# Patient Record
Sex: FEMALE | Race: WHITE | Employment: OTHER | URBAN - METROPOLITAN AREA
[De-identification: names, ages, dates, MRNs, and addresses within clinical notes are randomized per-mention and may not be internally consistent; named-entity substitution may affect disease eponyms.]

---

## 2017-01-05 ENCOUNTER — OFFICE VISIT (OUTPATIENT)
Dept: FAMILY MEDICINE CLINIC | Age: 82
End: 2017-01-05

## 2017-01-05 VITALS
HEIGHT: 64 IN | SYSTOLIC BLOOD PRESSURE: 134 MMHG | HEART RATE: 64 BPM | OXYGEN SATURATION: 97 % | DIASTOLIC BLOOD PRESSURE: 68 MMHG | WEIGHT: 138.2 LBS | BODY MASS INDEX: 23.6 KG/M2

## 2017-01-05 DIAGNOSIS — F41.9 ANXIETY: ICD-10-CM

## 2017-01-05 DIAGNOSIS — M25.50 ARTHRALGIA, UNSPECIFIED JOINT: ICD-10-CM

## 2017-01-05 DIAGNOSIS — I10 ESSENTIAL HYPERTENSION: Primary | ICD-10-CM

## 2017-01-05 PROCEDURE — 99214 OFFICE O/P EST MOD 30 MIN: CPT | Performed by: FAMILY MEDICINE

## 2017-01-05 RX ORDER — HYDROCHLOROTHIAZIDE 25 MG/1
25 TABLET ORAL DAILY
Qty: 30 TABLET | Refills: 3 | Status: SHIPPED | OUTPATIENT
Start: 2017-01-05 | End: 2017-04-06 | Stop reason: SDUPTHER

## 2017-01-05 RX ORDER — HYDRALAZINE HYDROCHLORIDE 50 MG/1
50 TABLET, FILM COATED ORAL 3 TIMES DAILY
Qty: 90 TABLET | Refills: 3 | Status: SHIPPED | OUTPATIENT
Start: 2017-01-05 | End: 2017-04-06 | Stop reason: SDUPTHER

## 2017-01-05 ASSESSMENT — ENCOUNTER SYMPTOMS
STRIDOR: 0
WHEEZING: 0
COUGH: 0
SHORTNESS OF BREATH: 0
CHEST TIGHTNESS: 0
CHOKING: 0

## 2017-02-20 PROBLEM — R07.9 CHEST PAIN: Status: ACTIVE | Noted: 2017-02-20

## 2017-02-20 PROBLEM — C56.9 OVARIAN CANCER (HCC): Status: ACTIVE | Noted: 2017-02-20

## 2017-02-20 PROBLEM — E87.1 HYPONATREMIA: Status: ACTIVE | Noted: 2017-02-20

## 2017-02-20 PROBLEM — E03.9 HYPOTHYROID: Status: ACTIVE | Noted: 2017-02-20

## 2017-03-06 RX ORDER — LOSARTAN POTASSIUM 100 MG/1
TABLET ORAL
Qty: 90 TABLET | Refills: 1 | Status: SHIPPED | OUTPATIENT
Start: 2017-03-06 | End: 2017-09-26

## 2017-03-21 ENCOUNTER — TELEPHONE (OUTPATIENT)
Dept: FAMILY MEDICINE CLINIC | Age: 82
End: 2017-03-21

## 2017-04-06 ENCOUNTER — OFFICE VISIT (OUTPATIENT)
Dept: FAMILY MEDICINE CLINIC | Age: 82
End: 2017-04-06

## 2017-04-06 VITALS
DIASTOLIC BLOOD PRESSURE: 60 MMHG | BODY MASS INDEX: 23.99 KG/M2 | HEART RATE: 64 BPM | WEIGHT: 144 LBS | SYSTOLIC BLOOD PRESSURE: 150 MMHG | HEIGHT: 65 IN | OXYGEN SATURATION: 98 %

## 2017-04-06 DIAGNOSIS — M25.511 CHRONIC RIGHT SHOULDER PAIN: ICD-10-CM

## 2017-04-06 DIAGNOSIS — G89.29 CHRONIC RIGHT SHOULDER PAIN: ICD-10-CM

## 2017-04-06 DIAGNOSIS — I10 ESSENTIAL HYPERTENSION: Primary | ICD-10-CM

## 2017-04-06 DIAGNOSIS — M79.672 LEFT FOOT PAIN: ICD-10-CM

## 2017-04-06 DIAGNOSIS — E89.0 POSTOPERATIVE HYPOTHYROIDISM: ICD-10-CM

## 2017-04-06 DIAGNOSIS — F41.9 ANXIETY: ICD-10-CM

## 2017-04-06 PROCEDURE — 99214 OFFICE O/P EST MOD 30 MIN: CPT | Performed by: FAMILY MEDICINE

## 2017-04-06 RX ORDER — LEVOTHYROXINE SODIUM 0.1 MG/1
100 TABLET ORAL DAILY
Qty: 90 TABLET | Refills: 1 | Status: ON HOLD | OUTPATIENT
Start: 2017-04-06 | End: 2020-05-13 | Stop reason: CLARIF

## 2017-04-06 RX ORDER — HYDROCHLOROTHIAZIDE 25 MG/1
25 TABLET ORAL DAILY
Qty: 90 TABLET | Refills: 3 | Status: SHIPPED | OUTPATIENT
Start: 2017-04-06 | End: 2017-06-08 | Stop reason: SDUPTHER

## 2017-04-06 RX ORDER — HYDRALAZINE HYDROCHLORIDE 50 MG/1
50 TABLET, FILM COATED ORAL 3 TIMES DAILY
Qty: 270 TABLET | Refills: 3 | Status: ON HOLD | OUTPATIENT
Start: 2017-04-06 | End: 2020-05-13 | Stop reason: CLARIF

## 2017-04-06 ASSESSMENT — ENCOUNTER SYMPTOMS
CHOKING: 0
WHEEZING: 0
CHEST TIGHTNESS: 0
SHORTNESS OF BREATH: 0
COUGH: 0
STRIDOR: 0

## 2017-04-20 ENCOUNTER — TELEPHONE (OUTPATIENT)
Dept: FAMILY MEDICINE CLINIC | Age: 82
End: 2017-04-20

## 2017-04-20 DIAGNOSIS — R52 PAIN: Primary | ICD-10-CM

## 2017-05-18 ENCOUNTER — OFFICE VISIT (OUTPATIENT)
Dept: FAMILY MEDICINE CLINIC | Age: 82
End: 2017-05-18

## 2017-05-18 VITALS
WEIGHT: 141 LBS | HEIGHT: 65 IN | SYSTOLIC BLOOD PRESSURE: 134 MMHG | BODY MASS INDEX: 23.49 KG/M2 | HEART RATE: 72 BPM | OXYGEN SATURATION: 96 % | DIASTOLIC BLOOD PRESSURE: 62 MMHG

## 2017-05-18 DIAGNOSIS — K21.9 GASTROESOPHAGEAL REFLUX DISEASE, ESOPHAGITIS PRESENCE NOT SPECIFIED: ICD-10-CM

## 2017-05-18 DIAGNOSIS — K59.00 CONSTIPATION, UNSPECIFIED CONSTIPATION TYPE: Primary | ICD-10-CM

## 2017-05-18 PROCEDURE — 99213 OFFICE O/P EST LOW 20 MIN: CPT | Performed by: NURSE PRACTITIONER

## 2017-05-18 RX ORDER — GREEN TEA/HOODIA GORDONII 315-12.5MG
1 CAPSULE ORAL DAILY
Qty: 90 TABLET | Refills: 0 | Status: SHIPPED | OUTPATIENT
Start: 2017-05-18 | End: 2017-09-26

## 2017-05-18 RX ORDER — OMEPRAZOLE 20 MG/1
20 CAPSULE, DELAYED RELEASE ORAL DAILY
Qty: 90 CAPSULE | Refills: 0 | Status: SHIPPED | OUTPATIENT
Start: 2017-05-18 | End: 2017-11-15 | Stop reason: SDUPTHER

## 2017-05-18 ASSESSMENT — ENCOUNTER SYMPTOMS
BLOATING: 1
VOMITING: 0
HEMATOCHEZIA: 0
RECTAL PAIN: 0
CONSTIPATION: 1
NAUSEA: 0
BACK PAIN: 0
FLATUS: 1
ABDOMINAL PAIN: 0
DIARRHEA: 0

## 2017-06-05 ENCOUNTER — OFFICE VISIT (OUTPATIENT)
Dept: FAMILY MEDICINE CLINIC | Age: 82
End: 2017-06-05

## 2017-06-05 VITALS
SYSTOLIC BLOOD PRESSURE: 152 MMHG | DIASTOLIC BLOOD PRESSURE: 86 MMHG | OXYGEN SATURATION: 96 % | WEIGHT: 141 LBS | HEIGHT: 65 IN | HEART RATE: 84 BPM | BODY MASS INDEX: 23.49 KG/M2

## 2017-06-05 DIAGNOSIS — M54.5 ACUTE LOW BACK PAIN, UNSPECIFIED BACK PAIN LATERALITY, WITH SCIATICA PRESENCE UNSPECIFIED: ICD-10-CM

## 2017-06-05 DIAGNOSIS — K59.00 CONSTIPATION, UNSPECIFIED CONSTIPATION TYPE: ICD-10-CM

## 2017-06-05 PROCEDURE — 99213 OFFICE O/P EST LOW 20 MIN: CPT | Performed by: NURSE PRACTITIONER

## 2017-06-05 RX ORDER — DEXAMETHASONE 4 MG/1
6 TABLET ORAL 2 TIMES DAILY WITH MEALS
Qty: 15 TABLET | Refills: 0 | Status: SHIPPED | OUTPATIENT
Start: 2017-06-05 | End: 2017-06-10

## 2017-06-05 ASSESSMENT — ENCOUNTER SYMPTOMS
ABDOMINAL PAIN: 0
BOWEL INCONTINENCE: 0
BACK PAIN: 1
CONSTIPATION: 1

## 2017-06-06 RX ORDER — METOPROLOL SUCCINATE 100 MG/1
TABLET, EXTENDED RELEASE ORAL
Qty: 90 TABLET | Refills: 1 | Status: SHIPPED | OUTPATIENT
Start: 2017-06-06 | End: 2018-02-23 | Stop reason: SDUPTHER

## 2017-06-08 ENCOUNTER — OFFICE VISIT (OUTPATIENT)
Dept: FAMILY MEDICINE CLINIC | Age: 82
End: 2017-06-08

## 2017-06-08 VITALS
SYSTOLIC BLOOD PRESSURE: 126 MMHG | WEIGHT: 143 LBS | HEART RATE: 65 BPM | HEIGHT: 65 IN | BODY MASS INDEX: 23.82 KG/M2 | DIASTOLIC BLOOD PRESSURE: 70 MMHG | OXYGEN SATURATION: 98 %

## 2017-06-08 DIAGNOSIS — K59.00 CONSTIPATION, UNSPECIFIED CONSTIPATION TYPE: ICD-10-CM

## 2017-06-08 DIAGNOSIS — E86.0 DEHYDRATION: ICD-10-CM

## 2017-06-08 DIAGNOSIS — M54.42 ACUTE BILATERAL LOW BACK PAIN WITH BILATERAL SCIATICA: Primary | ICD-10-CM

## 2017-06-08 DIAGNOSIS — M54.41 ACUTE BILATERAL LOW BACK PAIN WITH BILATERAL SCIATICA: Primary | ICD-10-CM

## 2017-06-08 PROCEDURE — 99214 OFFICE O/P EST MOD 30 MIN: CPT | Performed by: FAMILY MEDICINE

## 2017-06-08 RX ORDER — HYDROCHLOROTHIAZIDE 25 MG/1
25 TABLET ORAL DAILY
Qty: 90 TABLET | Refills: 3 | Status: SHIPPED | OUTPATIENT
Start: 2017-06-08 | End: 2017-06-20 | Stop reason: ALTCHOICE

## 2017-06-08 ASSESSMENT — ENCOUNTER SYMPTOMS
SHORTNESS OF BREATH: 0
CHEST TIGHTNESS: 0
CHOKING: 0
COUGH: 0
STRIDOR: 0
BACK PAIN: 1
CONSTIPATION: 1
WHEEZING: 0

## 2017-06-09 ENCOUNTER — TELEPHONE (OUTPATIENT)
Dept: FAMILY MEDICINE CLINIC | Age: 82
End: 2017-06-09

## 2017-06-09 RX ORDER — OCTISALATE, AVOBENZONE, HOMOSALATE, AND OCTOCRYLENE 29.4; 29.4; 49; 25.48 MG/ML; MG/ML; MG/ML; MG/ML
1 LOTION TOPICAL DAILY
Qty: 30 CAPSULE | Refills: 3 | Status: SHIPPED | OUTPATIENT
Start: 2017-06-09 | End: 2017-09-26

## 2017-06-14 ENCOUNTER — TELEPHONE (OUTPATIENT)
Dept: FAMILY MEDICINE CLINIC | Age: 82
End: 2017-06-14

## 2017-06-14 DIAGNOSIS — Z13.9 SCREENING: Primary | ICD-10-CM

## 2017-06-15 ENCOUNTER — CARE COORDINATION (OUTPATIENT)
Dept: CARE COORDINATION | Age: 82
End: 2017-06-15

## 2017-06-15 ENCOUNTER — OFFICE VISIT (OUTPATIENT)
Dept: FAMILY MEDICINE CLINIC | Age: 82
End: 2017-06-15

## 2017-06-15 ENCOUNTER — TELEPHONE (OUTPATIENT)
Dept: FAMILY MEDICINE CLINIC | Age: 82
End: 2017-06-15

## 2017-06-15 VITALS
WEIGHT: 139 LBS | HEIGHT: 65 IN | SYSTOLIC BLOOD PRESSURE: 132 MMHG | BODY MASS INDEX: 23.16 KG/M2 | HEART RATE: 64 BPM | DIASTOLIC BLOOD PRESSURE: 60 MMHG | OXYGEN SATURATION: 96 %

## 2017-06-15 DIAGNOSIS — N39.0 URINARY TRACT INFECTION WITHOUT HEMATURIA, SITE UNSPECIFIED: ICD-10-CM

## 2017-06-15 DIAGNOSIS — S22.000D THORACIC COMPRESSION FRACTURE, WITH ROUTINE HEALING, SUBSEQUENT ENCOUNTER: Primary | ICD-10-CM

## 2017-06-15 PROBLEM — S22.000A THORACIC COMPRESSION FRACTURE (HCC): Status: ACTIVE | Noted: 2017-06-15

## 2017-06-15 PROCEDURE — 99214 OFFICE O/P EST MOD 30 MIN: CPT | Performed by: FAMILY MEDICINE

## 2017-06-15 ASSESSMENT — ENCOUNTER SYMPTOMS
BACK PAIN: 1
CHEST TIGHTNESS: 0
COUGH: 0
WHEEZING: 0
SHORTNESS OF BREATH: 1
CHOKING: 0
STRIDOR: 0

## 2017-06-16 ENCOUNTER — TELEPHONE (OUTPATIENT)
Dept: FAMILY MEDICINE CLINIC | Age: 82
End: 2017-06-16

## 2017-06-20 ENCOUNTER — OFFICE VISIT (OUTPATIENT)
Dept: FAMILY MEDICINE CLINIC | Age: 82
End: 2017-06-20

## 2017-06-20 VITALS
SYSTOLIC BLOOD PRESSURE: 110 MMHG | HEIGHT: 65 IN | BODY MASS INDEX: 22.66 KG/M2 | HEART RATE: 74 BPM | OXYGEN SATURATION: 98 % | WEIGHT: 136 LBS | DIASTOLIC BLOOD PRESSURE: 70 MMHG

## 2017-06-20 DIAGNOSIS — R14.0 ABDOMINAL BLOATING: ICD-10-CM

## 2017-06-20 DIAGNOSIS — S22.000D THORACIC COMPRESSION FRACTURE, WITH ROUTINE HEALING, SUBSEQUENT ENCOUNTER: ICD-10-CM

## 2017-06-20 DIAGNOSIS — N30.01 ACUTE CYSTITIS WITH HEMATURIA: Primary | ICD-10-CM

## 2017-06-20 PROCEDURE — 99214 OFFICE O/P EST MOD 30 MIN: CPT | Performed by: FAMILY MEDICINE

## 2017-06-20 ASSESSMENT — PATIENT HEALTH QUESTIONNAIRE - PHQ9
SUM OF ALL RESPONSES TO PHQ9 QUESTIONS 1 & 2: 0
SUM OF ALL RESPONSES TO PHQ QUESTIONS 1-9: 0
1. LITTLE INTEREST OR PLEASURE IN DOING THINGS: 0
2. FEELING DOWN, DEPRESSED OR HOPELESS: 0

## 2017-06-20 ASSESSMENT — ENCOUNTER SYMPTOMS
CHOKING: 0
CHEST TIGHTNESS: 0
COUGH: 0
BACK PAIN: 1
STRIDOR: 0
WHEEZING: 0
SHORTNESS OF BREATH: 0

## 2017-06-22 PROBLEM — N34.2 INFECTIVE URETHRITIS: Status: ACTIVE | Noted: 2017-06-22

## 2017-06-23 ENCOUNTER — CARE COORDINATION (OUTPATIENT)
Dept: CARE COORDINATION | Age: 82
End: 2017-06-23

## 2017-09-26 ENCOUNTER — OFFICE VISIT (OUTPATIENT)
Dept: ORTHOPEDIC SURGERY | Age: 82
End: 2017-09-26

## 2017-09-26 VITALS
HEART RATE: 55 BPM | DIASTOLIC BLOOD PRESSURE: 81 MMHG | BODY MASS INDEX: 22.48 KG/M2 | SYSTOLIC BLOOD PRESSURE: 152 MMHG | WEIGHT: 134.92 LBS | HEIGHT: 65 IN

## 2017-09-26 DIAGNOSIS — M51.36 DDD (DEGENERATIVE DISC DISEASE), LUMBAR: ICD-10-CM

## 2017-09-26 DIAGNOSIS — S22.000A COMPRESSION FRACTURE OF BODY OF THORACIC VERTEBRA (HCC): Primary | ICD-10-CM

## 2017-09-26 PROCEDURE — L0637 LSO SC R ANT/POS PNL PRE CST: HCPCS | Performed by: PHYSICIAN ASSISTANT

## 2017-09-26 PROCEDURE — 99203 OFFICE O/P NEW LOW 30 MIN: CPT | Performed by: PHYSICIAN ASSISTANT

## 2017-09-26 PROCEDURE — 72070 X-RAY EXAM THORAC SPINE 2VWS: CPT | Performed by: PHYSICIAN ASSISTANT

## 2017-09-26 PROCEDURE — 72100 X-RAY EXAM L-S SPINE 2/3 VWS: CPT | Performed by: PHYSICIAN ASSISTANT

## 2017-09-26 RX ORDER — METHYLPREDNISOLONE 4 MG/1
TABLET ORAL
Qty: 1 KIT | Refills: 0 | Status: SHIPPED | OUTPATIENT
Start: 2017-09-26 | End: 2017-10-02

## 2017-09-28 ENCOUNTER — TELEPHONE (OUTPATIENT)
Dept: ORTHOPEDIC SURGERY | Age: 82
End: 2017-09-28

## 2017-09-29 ENCOUNTER — HOSPITAL ENCOUNTER (OUTPATIENT)
Dept: MRI IMAGING | Age: 82
Discharge: HOME | End: 2017-09-29
Attending: PHYSICIAN ASSISTANT | Admitting: PHYSICIAN ASSISTANT

## 2017-09-29 DIAGNOSIS — M51.36 DDD (DEGENERATIVE DISC DISEASE), LUMBAR: ICD-10-CM

## 2017-09-29 DIAGNOSIS — R07.9 CHEST PAIN: ICD-10-CM

## 2017-09-29 DIAGNOSIS — S22.000A COMPRESSION FRACTURE OF BODY OF THORACIC VERTEBRA (HCC): ICD-10-CM

## 2017-09-29 LAB
CREAT SERPL-MCNC: 0.9 MG/DL (ref 0.6–1.2)
GFR AFRICAN AMERICAN: >60
GFR NON-AFRICAN AMERICAN: 60

## 2017-10-13 ENCOUNTER — OFFICE VISIT (OUTPATIENT)
Dept: ORTHOPEDIC SURGERY | Age: 82
End: 2017-10-13

## 2017-10-13 VITALS
BODY MASS INDEX: 22.48 KG/M2 | HEIGHT: 65 IN | HEART RATE: 69 BPM | DIASTOLIC BLOOD PRESSURE: 86 MMHG | SYSTOLIC BLOOD PRESSURE: 184 MMHG | WEIGHT: 134.92 LBS

## 2017-10-13 DIAGNOSIS — S22.000A COMPRESSION FRACTURE OF BODY OF THORACIC VERTEBRA (HCC): Primary | ICD-10-CM

## 2017-10-13 PROCEDURE — 99212 OFFICE O/P EST SF 10 MIN: CPT | Performed by: PHYSICAL MEDICINE & REHABILITATION

## 2017-11-22 ENCOUNTER — OFFICE VISIT (OUTPATIENT)
Dept: ORTHOPEDIC SURGERY | Age: 82
End: 2017-11-22

## 2017-11-22 VITALS
BODY MASS INDEX: 21.61 KG/M2 | HEART RATE: 67 BPM | DIASTOLIC BLOOD PRESSURE: 94 MMHG | SYSTOLIC BLOOD PRESSURE: 184 MMHG | WEIGHT: 134.48 LBS | HEIGHT: 66 IN

## 2017-11-22 DIAGNOSIS — S22.000D CLOSED COMPRESSION FRACTURE OF THORACIC VERTEBRA WITH ROUTINE HEALING, SUBSEQUENT ENCOUNTER: Primary | ICD-10-CM

## 2017-11-22 PROCEDURE — G8400 PT W/DXA NO RESULTS DOC: HCPCS | Performed by: PHYSICAL MEDICINE & REHABILITATION

## 2017-11-22 PROCEDURE — G8420 CALC BMI NORM PARAMETERS: HCPCS | Performed by: PHYSICAL MEDICINE & REHABILITATION

## 2017-11-22 PROCEDURE — 1123F ACP DISCUSS/DSCN MKR DOCD: CPT | Performed by: PHYSICAL MEDICINE & REHABILITATION

## 2017-11-22 PROCEDURE — 1036F TOBACCO NON-USER: CPT | Performed by: PHYSICAL MEDICINE & REHABILITATION

## 2017-11-22 PROCEDURE — G8427 DOCREV CUR MEDS BY ELIG CLIN: HCPCS | Performed by: PHYSICAL MEDICINE & REHABILITATION

## 2017-11-22 PROCEDURE — 4040F PNEUMOC VAC/ADMIN/RCVD: CPT | Performed by: PHYSICAL MEDICINE & REHABILITATION

## 2017-11-22 PROCEDURE — G8484 FLU IMMUNIZE NO ADMIN: HCPCS | Performed by: PHYSICAL MEDICINE & REHABILITATION

## 2017-11-22 PROCEDURE — 1090F PRES/ABSN URINE INCON ASSESS: CPT | Performed by: PHYSICAL MEDICINE & REHABILITATION

## 2017-11-22 PROCEDURE — 99213 OFFICE O/P EST LOW 20 MIN: CPT | Performed by: PHYSICAL MEDICINE & REHABILITATION

## 2017-11-22 NOTE — PROGRESS NOTES
Follow up: [address]:    Chief Complaint   Patient presents with    Back Pain     F/u thoracic compression fx       HISTORY OF PRESENT ILLNESS:                The patient is a 80 y.o. female cely at [facility] follow-up T12 compression fracture status post total 8 weeks of bracing. Still it back pain. No weakness read no radiating leg pain. She reports chronic low back pain and chronic thoracic pain quite diffusely she is not better. Pain Assessment  Location of Pain: Back  Severity of Pain: 8  Quality of Pain: Aching  Duration of Pain: Persistent  Frequency of Pain: Intermittent  Aggravating Factors: Bending, Stretching, Straightening, Exercise  Relieving Factors: Rest  Result of Injury: No  Work-Related Injury: No  Are there other pain locations you wish to document?: No      Past/Current Treatment     PT: Compliant with LSO ×8 weeks  Chiro:  Injections:   Medications: NSAIDs and Tylenol  Surgery:     Past Medical History: Medical history form was reviewed and scanned into the Media tab  Past Medical History:   Diagnosis Date    Acute bilateral low back pain with bilateral sciatica 6/8/2017    Anxiety     Cancer ([facility] [address]     ovarian cancer    Depression     Hypertension     Hypothyroid 2/20/2017    Thyroid disease         REVIEW OF SYSTEMS:   CONSTITUTIONAL: Denies unexplained weight loss, fevers, chills or fatigue  NEUROLOGIC: Denies tremors or seizures         PHYSICAL EXAM:    Vitals: Blood pressure (!) 184/94, pulse 67, height 5' 5.51\" (1.664 m), weight 134 lb 7.7 oz (61 kg), not currently breastfeeding. GENERAL EXAM:  · General Apparence: Patient is adequately groomed with no evidence of malnutrition. · Orientation: The patient is oriented to time, place and person. · Mood & Affect:The patient's mood and affect are appropriate   · Vascular: Examination reveals no swelling tenderness in upper or lower extremities.    · Lymphatic: The lymphatic examination bilaterally reveals DDD    Asst. Nichelle bracing additional 4 weeks with continue lifting restrictions and off work on short-term disability.   She'll follow-up in 3-4 weeks with repeat x-rays        DANNI Alaniz Arm

## 2017-11-28 ENCOUNTER — TELEPHONE (OUTPATIENT)
Dept: ORTHOPEDIC SURGERY | Age: 82
End: 2017-11-28

## 2017-12-13 ENCOUNTER — OFFICE VISIT (OUTPATIENT)
Dept: ORTHOPEDIC SURGERY | Age: 82
End: 2017-12-13

## 2017-12-13 VITALS
DIASTOLIC BLOOD PRESSURE: 84 MMHG | BODY MASS INDEX: 21.61 KG/M2 | WEIGHT: 134.48 LBS | HEIGHT: 66 IN | HEART RATE: 62 BPM | SYSTOLIC BLOOD PRESSURE: 173 MMHG

## 2017-12-13 DIAGNOSIS — S22.000A CLOSED COMPRESSION FRACTURE OF THORACIC VERTEBRA, INITIAL ENCOUNTER (HCC): Primary | ICD-10-CM

## 2017-12-13 PROCEDURE — G8484 FLU IMMUNIZE NO ADMIN: HCPCS | Performed by: PHYSICAL MEDICINE & REHABILITATION

## 2017-12-13 PROCEDURE — G8427 DOCREV CUR MEDS BY ELIG CLIN: HCPCS | Performed by: PHYSICAL MEDICINE & REHABILITATION

## 2017-12-13 PROCEDURE — 1090F PRES/ABSN URINE INCON ASSESS: CPT | Performed by: PHYSICAL MEDICINE & REHABILITATION

## 2017-12-13 PROCEDURE — G8400 PT W/DXA NO RESULTS DOC: HCPCS | Performed by: PHYSICAL MEDICINE & REHABILITATION

## 2017-12-13 PROCEDURE — 1036F TOBACCO NON-USER: CPT | Performed by: PHYSICAL MEDICINE & REHABILITATION

## 2017-12-13 PROCEDURE — 4040F PNEUMOC VAC/ADMIN/RCVD: CPT | Performed by: PHYSICAL MEDICINE & REHABILITATION

## 2017-12-13 PROCEDURE — 99213 OFFICE O/P EST LOW 20 MIN: CPT | Performed by: PHYSICAL MEDICINE & REHABILITATION

## 2017-12-13 PROCEDURE — 1123F ACP DISCUSS/DSCN MKR DOCD: CPT | Performed by: PHYSICAL MEDICINE & REHABILITATION

## 2017-12-13 PROCEDURE — G8420 CALC BMI NORM PARAMETERS: HCPCS | Performed by: PHYSICAL MEDICINE & REHABILITATION

## 2017-12-13 RX ORDER — CITALOPRAM 10 MG/1
10 TABLET ORAL
COMMUNITY
Start: 2017-12-05 | End: 2019-04-18

## 2017-12-29 ENCOUNTER — TELEPHONE (OUTPATIENT)
Dept: ORTHOPEDIC SURGERY | Age: 82
End: 2017-12-29

## 2018-01-24 ENCOUNTER — OFFICE VISIT (OUTPATIENT)
Dept: ORTHOPEDIC SURGERY | Age: 83
End: 2018-01-24

## 2018-01-24 VITALS
DIASTOLIC BLOOD PRESSURE: 63 MMHG | BODY MASS INDEX: 21.61 KG/M2 | WEIGHT: 134.48 LBS | HEART RATE: 63 BPM | HEIGHT: 66 IN | SYSTOLIC BLOOD PRESSURE: 139 MMHG

## 2018-01-24 DIAGNOSIS — M51.36 DDD (DEGENERATIVE DISC DISEASE), LUMBAR: ICD-10-CM

## 2018-01-24 DIAGNOSIS — S22.000D CLOSED COMPRESSION FRACTURE OF THORACIC VERTEBRA WITH ROUTINE HEALING, SUBSEQUENT ENCOUNTER: Primary | ICD-10-CM

## 2018-01-24 PROCEDURE — 99214 OFFICE O/P EST MOD 30 MIN: CPT | Performed by: PHYSICAL MEDICINE & REHABILITATION

## 2018-01-24 PROCEDURE — 4040F PNEUMOC VAC/ADMIN/RCVD: CPT | Performed by: PHYSICAL MEDICINE & REHABILITATION

## 2018-01-24 PROCEDURE — G8484 FLU IMMUNIZE NO ADMIN: HCPCS | Performed by: PHYSICAL MEDICINE & REHABILITATION

## 2018-01-24 PROCEDURE — 1123F ACP DISCUSS/DSCN MKR DOCD: CPT | Performed by: PHYSICAL MEDICINE & REHABILITATION

## 2018-01-24 PROCEDURE — G8400 PT W/DXA NO RESULTS DOC: HCPCS | Performed by: PHYSICAL MEDICINE & REHABILITATION

## 2018-01-24 PROCEDURE — G8427 DOCREV CUR MEDS BY ELIG CLIN: HCPCS | Performed by: PHYSICAL MEDICINE & REHABILITATION

## 2018-01-24 PROCEDURE — 1036F TOBACCO NON-USER: CPT | Performed by: PHYSICAL MEDICINE & REHABILITATION

## 2018-01-24 PROCEDURE — G8420 CALC BMI NORM PARAMETERS: HCPCS | Performed by: PHYSICAL MEDICINE & REHABILITATION

## 2018-01-24 PROCEDURE — 1090F PRES/ABSN URINE INCON ASSESS: CPT | Performed by: PHYSICAL MEDICINE & REHABILITATION

## 2018-01-30 ENCOUNTER — HOSPITAL ENCOUNTER (OUTPATIENT)
Dept: PHYSICAL THERAPY | Age: 83
Discharge: HOME | End: 2018-01-31
Admitting: PHYSICAL MEDICINE & REHABILITATION

## 2018-01-30 NOTE — PLAN OF CARE
Special Tests: repetative lumbar:  Flexion supine  Increased LBP. Extension sitting no change. Increased symptoms with supine leg pull. Normal Abnormal N/A Comments   Toe walk         Heel Walk       Fwd Bend-aberrant or innominate mvmt)       Trendelenburg       Kemps/Quadrant       Stork       ABELARDO/Dom       Hip scour       SLR       Crossed SLR       Supine to sit       Hip thrust       SI distraction/compression       PA/Spring       Prone Instability test       Prone knee bend       Sacral Spring/thrust       Hautards x          [x] Patient history, allergies, meds reviewed. Medical chart reviewed. See intake form. Review Of Systems (ROS):  [x]Performed Review of systems (Integumentary, CardioPulmonary, Neurological) by intake and observation. Intake form has been scanned into medical record. Patient has been instructed to contact their primary care physician regarding ROS issues if not already being addressed at this time.       Co-morbidities/Complexities (which will affect course of rehabilitation):   []None           Arthritic conditions   []Rheumatoid arthritis (M05.9)  [x]Osteoarthritis (M19.91)   Cardiovascular conditions   [x]Hypertension (I10)  []Hyperlipidemia (E78.5)  []Angina pectoris (I20)  []Atherosclerosis (I70)   Musculoskeletal conditions   []Disc pathology   []Congenital spine pathologies   []Prior surgical intervention  []Osteoporosis (M81.8)  []Osteopenia (M85.8)   Endocrine conditions   []Hypothyroid (E03.9)  []Hyperthyroid Gastrointestinal conditions   []Constipation (D52.77)   Metabolic conditions   []Morbid obesity (E66.01)  []Diabetes type 1(E10.65) or 2 (E11.65)   []Neuropathy (G60.9)     Pulmonary conditions   []Asthma (J45)  []Coughing   []COPD (J44.9)   Psychological Disorders  []Anxiety (F41.9)  []Depression (F32.9)   []Other:   [x]Other: CA          Barriers to/and or personal factors that will affect rehab potential:              []Age  []Sex []Motivation/Lack of Motivation                        []Co-Morbidities              []Cognitive Function, education/learning barriers              []Environmental, home barriers              []profession/work barriers  []past PT/medical experience  []other:  Justification:     Falls Risk Assessment (30 days):   [x] Falls Risk assessed and no intervention required. [] Falls Risk assessed and Patient requires intervention due to being higher risk   TUG score (>12s at risk):     [] Falls education provided, including       G-Codes:  PT G-Codes  Functional Assessment Tool Used: oswestry  Score: 52%  Functional Limitation: Changing and maintaining body position  Changing and Maintaining Body Position Current Status (): At least 40 percent but less than 60 percent impaired, limited or restricted  Changing and Maintaining Body Position Goal Status ():  At least 20 percent but less than 40 percent impaired, limited or restricted    ASSESSMENT:   Functional Impairments:     []Noted lumbar/proximal hip hypomobility   []Noted lumbosacral and/or generalized hypermobility   [x]Decreased Lumbosacral/hip/LE functional ROM   [x]Decreased core/proximal hip strength and neuromuscular control    [x]Decreased LE functional strength    [x]Abnormal reflexes/sensation/myotomal/dermatomal deficits  [x]Reduced balance/proprioceptive control    []other:      Functional Activity Limitations (from functional questionnaire and intake)   [x]Reduced ability to tolerate prolonged functional positions   [x]Reduced ability or difficulty with changes of positions or transfers between positions   [x]Reduced ability to maintain good posture and demonstrate good body mechanics with sitting, bending, and lifting   []Reduced ability to sleep   [x] Reduced ability or tolerance with driving and/or computer work   [x]Reduced ability to perform lifting, reaching, carrying tasks   [x]Reduced ability to squat   [x]Reduced ability to forward total of 1-2 or more elements   [] a total of 3 or more elements   [] a total of 4 or more elements   [x] A clinical presentation with:  [x] stable and/or uncomplicated characteristics   [] evolving clinical presentation with changing characteristics  [] unstable and unpredictable characteristics;   [x] Clinical decision making of [x] low, [] moderate, [] high complexity using standardized patient assessment instrument and/or measurable assessment of functional outcome. [x] EVAL (LOW) 22265 (typically 20 minutes face-to-face)  [] EVAL (MOD) 47679 (typically 30 minutes face-to-face)  [] EVAL (HIGH) 65726 (typically 45 minutes face-to-face)  [] RE-EVAL     PLAN: Begin PT focusing on: proximal hip mobilizations, LB mobs, LB core activation, proximal hip activation, and HEP    Frequency/Duration:  1-2 days per week for 6 Weeks:  Interventions:  [x]  Therapeutic exercise including: strength training, ROM, for LE, Glutes and core   [x]  NMR activation and proprioception for glutes , LE and Core   [x]  Manual therapy as indicated for Hip complex, LE and spine to include: Dry Needling/IASTM, STM, PROM, Gr I-IV mobilizations, manipulation. [x]  Modalities as needed that may include: thermal agents, E-stim, Biofeedback, US, iontophoresis as indicated  [x]  Patient education on joint protection, postural re-education, activity modification, progression of HEP. HEP instruction: (see scanned forms)    GOALS:  Patient stated goal: RTW/ improved ADLs    Therapist goals for Patient:   Short Term Goals: To be achieved in: 2 weeks  1. Independent in HEP and progression per patient tolerance, in order to prevent re-injury. 2. Patient will have a decrease in pain to facilitate improvement in movement, function, and ADLs as indicated by Functional Deficits. Long Term Goals: To be achieved in: 6 weeks  1. Disability index score of 39% or less for the GIANNI to assist with reaching prior level of function.    2. Patient will

## 2018-02-01 ENCOUNTER — HOSPITAL ENCOUNTER (OUTPATIENT)
Dept: PHYSICAL THERAPY | Age: 83
Discharge: HOME | End: 2018-02-28
Attending: PHYSICAL MEDICINE & REHABILITATION | Admitting: PHYSICAL MEDICINE & REHABILITATION

## 2018-02-13 ENCOUNTER — HOSPITAL ENCOUNTER (OUTPATIENT)
Dept: PHYSICAL THERAPY | Age: 83
Discharge: HOME | End: 2018-02-14
Admitting: PHYSICAL MEDICINE & REHABILITATION

## 2018-02-16 RX ORDER — LOSARTAN POTASSIUM 100 MG/1
TABLET ORAL
Qty: 90 TABLET | Refills: 1 | OUTPATIENT
Start: 2018-02-16

## 2018-02-26 RX ORDER — LOSARTAN POTASSIUM 100 MG/1
TABLET ORAL
Qty: 90 TABLET | Refills: 1 | Status: SHIPPED | OUTPATIENT
Start: 2018-02-26

## 2018-02-26 RX ORDER — METOPROLOL SUCCINATE 100 MG/1
TABLET, EXTENDED RELEASE ORAL
Qty: 90 TABLET | Refills: 1 | Status: SHIPPED | OUTPATIENT
Start: 2018-02-26

## 2018-03-01 ENCOUNTER — HOSPITAL ENCOUNTER (OUTPATIENT)
Dept: PHYSICAL THERAPY | Age: 83
Discharge: HOME | End: 2018-03-31
Attending: PHYSICAL MEDICINE & REHABILITATION | Admitting: PHYSICAL MEDICINE & REHABILITATION

## 2018-04-25 ENCOUNTER — OFFICE VISIT (OUTPATIENT)
Dept: ORTHOPEDIC SURGERY | Age: 83
End: 2018-04-25

## 2018-04-25 ENCOUNTER — TELEPHONE (OUTPATIENT)
Dept: ORTHOPEDIC SURGERY | Age: 83
End: 2018-04-25

## 2018-04-25 VITALS
WEIGHT: 134.48 LBS | HEART RATE: 65 BPM | DIASTOLIC BLOOD PRESSURE: 72 MMHG | BODY MASS INDEX: 21.61 KG/M2 | SYSTOLIC BLOOD PRESSURE: 174 MMHG | HEIGHT: 66 IN

## 2018-04-25 DIAGNOSIS — S22.000A CLOSED COMPRESSION FRACTURE OF THORACIC VERTEBRA, INITIAL ENCOUNTER (HCC): Primary | ICD-10-CM

## 2018-04-25 DIAGNOSIS — M54.50 ACUTE LOW BACK PAIN WITHOUT SCIATICA, UNSPECIFIED BACK PAIN LATERALITY: ICD-10-CM

## 2018-04-25 PROCEDURE — 99214 OFFICE O/P EST MOD 30 MIN: CPT | Performed by: PHYSICAL MEDICINE & REHABILITATION

## 2018-04-25 PROCEDURE — G8427 DOCREV CUR MEDS BY ELIG CLIN: HCPCS | Performed by: PHYSICAL MEDICINE & REHABILITATION

## 2018-04-25 PROCEDURE — G8420 CALC BMI NORM PARAMETERS: HCPCS | Performed by: PHYSICAL MEDICINE & REHABILITATION

## 2018-04-25 PROCEDURE — 1036F TOBACCO NON-USER: CPT | Performed by: PHYSICAL MEDICINE & REHABILITATION

## 2018-04-25 PROCEDURE — 1090F PRES/ABSN URINE INCON ASSESS: CPT | Performed by: PHYSICAL MEDICINE & REHABILITATION

## 2018-04-25 PROCEDURE — 1123F ACP DISCUSS/DSCN MKR DOCD: CPT | Performed by: PHYSICAL MEDICINE & REHABILITATION

## 2018-04-25 PROCEDURE — 4040F PNEUMOC VAC/ADMIN/RCVD: CPT | Performed by: PHYSICAL MEDICINE & REHABILITATION

## 2018-04-25 PROCEDURE — G8400 PT W/DXA NO RESULTS DOC: HCPCS | Performed by: PHYSICAL MEDICINE & REHABILITATION

## 2018-05-07 PROBLEM — M47.817 LUMBOSACRAL SPONDYLOSIS WITHOUT MYELOPATHY: Chronic | Status: ACTIVE | Noted: 2018-05-07

## 2018-05-07 PROBLEM — M51.26 DISPLACEMENT OF LUMBAR INTERVERTEBRAL DISC WITHOUT MYELOPATHY: Chronic | Status: ACTIVE | Noted: 2018-05-07

## 2018-05-07 PROBLEM — M51.26 DISPLACEMENT OF LUMBAR INTERVERTEBRAL DISC WITHOUT MYELOPATHY: Status: ACTIVE | Noted: 2018-05-07

## 2018-05-07 PROBLEM — M47.817 LUMBOSACRAL SPONDYLOSIS WITHOUT MYELOPATHY: Status: ACTIVE | Noted: 2018-05-07

## 2018-05-07 PROBLEM — M51.37 DEGENERATION OF LUMBAR OR LUMBOSACRAL INTERVERTEBRAL DISC: Chronic | Status: ACTIVE | Noted: 2018-05-07

## 2018-05-07 PROBLEM — M51.37 DEGENERATION OF LUMBAR OR LUMBOSACRAL INTERVERTEBRAL DISC: Status: ACTIVE | Noted: 2018-05-07

## 2018-05-09 ENCOUNTER — TELEPHONE (OUTPATIENT)
Dept: ORTHOPEDIC SURGERY | Age: 83
End: 2018-05-09

## 2018-09-26 PROBLEM — E86.0 DEHYDRATION: Status: RESOLVED | Noted: 2017-06-08 | Resolved: 2018-09-26

## 2018-11-14 ENCOUNTER — APPOINTMENT (OUTPATIENT)
Dept: CT IMAGING | Age: 83
End: 2018-11-14
Payer: MEDICARE

## 2018-11-14 ENCOUNTER — HOSPITAL ENCOUNTER (EMERGENCY)
Age: 83
Discharge: HOME | End: 2018-11-14
Attending: EMERGENCY MEDICINE
Payer: MEDICARE

## 2018-11-14 ENCOUNTER — APPOINTMENT (OUTPATIENT)
Dept: GENERAL RADIOLOGY | Age: 83
End: 2018-11-14
Payer: MEDICARE

## 2018-11-14 VITALS
TEMPERATURE: 97.7 F | OXYGEN SATURATION: 97 % | HEART RATE: 82 BPM | HEIGHT: 65 IN | WEIGHT: 126 LBS | SYSTOLIC BLOOD PRESSURE: 127 MMHG | RESPIRATION RATE: 20 BRPM | BODY MASS INDEX: 20.99 KG/M2 | DIASTOLIC BLOOD PRESSURE: 57 MMHG
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
## 2018-11-14 DIAGNOSIS — M25.551 RIGHT HIP PAIN: ICD-10-CM

## 2018-11-14 LAB
BACTERIA: ABNORMAL /HPF
BILIRUBIN URINE: NEGATIVE
BLOOD, URINE: NEGATIVE
CLARITY: CLEAR
COLOR: YELLOW
EPITHELIAL CELLS, UA: ABNORMAL /HPF
GLUCOSE URINE: NEGATIVE MG/DL
KETONES, URINE: NEGATIVE MG/DL
LEUKOCYTE ESTERASE, URINE: ABNORMAL
MICROSCOPIC EXAMINATION: YES
NITRITE, URINE: NEGATIVE
PH UA: 8
PROTEIN UA: 30 MG/DL
RBC UA: ABNORMAL /HPF (ref 0–2)
SPECIFIC GRAVITY UA: 1.01
URINE TYPE: ABNORMAL
UROBILINOGEN, URINE: 0.2 E.U./DL
WBC UA: ABNORMAL /HPF (ref 0–5)

## 2018-11-14 PROCEDURE — 81001 URINALYSIS AUTO W/SCOPE: CPT

## 2018-11-14 PROCEDURE — 73502 X-RAY EXAM HIP UNI 2-3 VIEWS: CPT

## 2018-11-14 PROCEDURE — 73700 CT LOWER EXTREMITY W/O DYE: CPT

## 2018-11-14 PROCEDURE — 6370000000 HC RX 637 (ALT 250 FOR IP): Performed by: EMERGENCY MEDICINE

## 2018-11-14 PROCEDURE — 99284 EMERGENCY DEPT VISIT MOD MDM: CPT

## 2018-11-14 RX ORDER — LIDOCAINE 50 MG/G
1 PATCH TOPICAL DAILY
Qty: 30 PATCH | Refills: 0 | Status: SHIPPED | OUTPATIENT
Start: 2018-11-14 | End: 2019-04-18

## 2018-11-14 RX ORDER — LIDOCAINE 4 G/G
1 PATCH TOPICAL DAILY
Status: DISCONTINUED | OUTPATIENT
Start: 2018-11-14 | End: 2018-11-14 | Stop reason: HOSPADM

## 2018-11-14 RX ORDER — OXYCODONE HYDROCHLORIDE 5 MG/1
5 TABLET ORAL ONCE
Status: COMPLETED | OUTPATIENT
Start: 2018-11-14 | End: 2018-11-14

## 2018-11-14 RX ADMIN — OXYCODONE HYDROCHLORIDE 5 MG: 5 TABLET ORAL at 10:01

## 2018-11-14 ASSESSMENT — PAIN DESCRIPTION - LOCATION
LOCATION: HIP;BACK
LOCATION: HIP
LOCATION: BACK;HIP
LOCATION: FLANK
LOCATION: BACK;HIP

## 2018-11-14 ASSESSMENT — PAIN SCALES - GENERAL
PAINLEVEL_OUTOF10: 5
PAINLEVEL_OUTOF10: 10
PAINLEVEL_OUTOF10: 2
PAINLEVEL_OUTOF10: 10
PAINLEVEL_OUTOF10: 2

## 2018-11-14 ASSESSMENT — ENCOUNTER SYMPTOMS
WHEEZING: 0
FACIAL SWELLING: 0
SHORTNESS OF BREATH: 0
VOICE CHANGE: 0
COLOR CHANGE: 0
VOMITING: 0
TROUBLE SWALLOWING: 0
STRIDOR: 0
NAUSEA: 0
ABDOMINAL PAIN: 0
BACK PAIN: 1

## 2018-11-14 ASSESSMENT — PAIN DESCRIPTION - ORIENTATION
ORIENTATION: RIGHT

## 2018-11-14 ASSESSMENT — PAIN DESCRIPTION - DESCRIPTORS: DESCRIPTORS: ACHING

## 2018-11-14 ASSESSMENT — PAIN SCALES - WONG BAKER
WONGBAKER_NUMERICALRESPONSE: 10
WONGBAKER_NUMERICALRESPONSE: 2

## 2018-11-14 ASSESSMENT — PAIN DESCRIPTION - PAIN TYPE
TYPE: ACUTE PAIN
TYPE: ACUTE PAIN

## 2018-11-14 NOTE — ED PROVIDER NOTES
for shortness of breath, wheezing and stridor. Cardiovascular: Negative for chest pain and palpitations. Gastrointestinal: Negative for abdominal pain, nausea and vomiting. Genitourinary: Negative for dysuria and vaginal bleeding. Musculoskeletal: Positive for arthralgias and back pain. Negative for neck pain, neck stiffness and stiffness. Skin: Negative for color change and wound. Neurological: Negative for seizures and syncope. Psychiatric/Behavioral: Negative for self-injury and suicidal ideas. Except as noted above the remainder of the review of systems was reviewed and negative. PAST MEDICAL HISTORY     Past Medical History:   Diagnosis Date    Acute bilateral low back pain with bilateral sciatica 6/8/2017    Anxiety     Cancer (HCC)     ovarian cancer    Degeneration of lumbar or lumbosacral intervertebral disc 5/7/2018    Depression     Hypertension     Hypothyroid 2/20/2017    Thyroid disease          SURGICAL HISTORY       Past Surgical History:   Procedure Laterality Date    HYSTERECTOMY      THYROIDECTOMY           CURRENT MEDICATIONS       Previous Medications    ALPRAZOLAM (XANAX) 0.25 MG TABLET    Take 1 tablet by mouth 2 times daily as needed for Anxiety    CITALOPRAM (CELEXA) 10 MG TABLET    Take 10 mg by mouth    HYDRALAZINE (APRESOLINE) 50 MG TABLET    Take 1 tablet by mouth 3 times daily    HYDROCODONE-ACETAMINOPHEN (NORCO) 5-325 MG PER TABLET    Take 1 tablet by mouth 2 times daily as needed for Pain for up to 30 days. Altagracia Restrepo     LEVOTHYROXINE (SYNTHROID) 100 MCG TABLET    Take 1 tablet by mouth Daily    LOSARTAN (COZAAR) 100 MG TABLET    TAKE 1 TABLET EVERY DAY    MELOXICAM (MOBIC PO)    Take by mouth    METOPROLOL SUCCINATE (TOPROL XL) 100 MG EXTENDED RELEASE TABLET    TAKE 1 TABLET EVERY DAY    OMEPRAZOLE (PRILOSEC) 20 MG DELAYED RELEASE CAPSULE    TAKE 1 CAPSULE EVERY DAY       ALLERGIES     Amlodipine besylate; Demerol hcl [meperidine]; and Lisinopril    FAMILY HISTORY       Family History   Problem Relation Age of Onset    Cancer Brother           SOCIAL HISTORY       Social History     Social History    Marital status:      Spouse name: N/A    Number of children: N/A    Years of education: N/A     Social History Main Topics    Smoking status: Former Smoker    Smokeless tobacco: Never Used    Alcohol use No    Drug use: No    Sexual activity: Not Asked     Other Topics Concern    None     Social History Narrative    None         PHYSICAL EXAM    (up to 7 for level 4, 8 or more for level 5)     ED Triage Vitals [11/14/18 0903]   BP Temp Temp Source Pulse Resp SpO2 Height Weight   (!) 204/80 97.7 °F (36.5 °C) Oral 77 20 100 % 5' 5\" (1.651 m) 126 lb (57.2 kg)       Physical Exam   Constitutional: She is oriented to person, place, and time. She appears well-developed and well-nourished. HENT:   Head: Normocephalic and atraumatic. Right Ear: External ear normal.   Left Ear: External ear normal.   No signs of head or neck trauma on exam   Eyes: Conjunctivae and EOM are normal.   Neck: Neck supple. No JVD present. No tracheal deviation present. Cardiovascular: Normal rate and intact distal pulses. Intact distal pulses diffusely   Pulmonary/Chest: Effort normal and breath sounds normal. No respiratory distress. She has no wheezes. Abdominal: Soft. She exhibits no distension. There is no tenderness. There is no rebound and no guarding. Musculoskeletal: Normal range of motion. She exhibits tenderness (mild right acetabular hip tenderness and right paraspinal lumbar tenderness. No mildine lumbar tenderness. No bruising or skin abnormality of the back or hip. Small skin tears to the hands but no tenderness to bones with palpation of hand). She exhibits no deformity. Full ROM of all joints of all 4 extremities. Neurological: She is alert and oriented to person, place, and time. No cranial nerve deficit. Moves all 4 extremities spontaneously.  Normal DISPOSITION/PLAN   DISPOSITION Decision To Discharge 11/14/2018 12:55:20 PM      PATIENT REFERRED TO:  Isabel Ron MD  [phone]    In 2 days      [facility] [address] [phone]    If symptoms worsen      DISCHARGE MEDICATIONS:  New Prescriptions    LIDOCAINE (LIDODERM) 5 %    Place 1 patch onto the skin daily 12 hours on, 12 hours off. (Please note that portions of this note were completed with a voice recognition program.  Efforts were made to edit the dictations but occasionally words aremis-transcribed. )    Kristen Rivas MD (electronically signed)  Attending Emergency Physician           Kristen Rivas MD  11/14/18 0243

## 2018-12-09 ENCOUNTER — APPOINTMENT (OUTPATIENT)
Dept: CT IMAGING | Age: 83
End: 2018-12-09
Payer: MEDICARE

## 2018-12-09 ENCOUNTER — HOSPITAL ENCOUNTER (EMERGENCY)
Age: 83
Discharge: HOME | End: 2018-12-09
Attending: EMERGENCY MEDICINE
Payer: MEDICARE

## 2018-12-09 VITALS
HEART RATE: 61 BPM | RESPIRATION RATE: 16 BRPM | DIASTOLIC BLOOD PRESSURE: 74 MMHG | WEIGHT: 127 LBS | BODY MASS INDEX: 20.41 KG/M2 | TEMPERATURE: 97.4 F | OXYGEN SATURATION: 97 % | SYSTOLIC BLOOD PRESSURE: 197 MMHG | HEIGHT: 66 IN

## 2018-12-09 DIAGNOSIS — S22.000A COMPRESSION FRACTURE OF BODY OF THORACIC VERTEBRA (HCC): ICD-10-CM

## 2018-12-09 DIAGNOSIS — S09.90XA CLOSED HEAD INJURY, INITIAL ENCOUNTER: Primary | ICD-10-CM

## 2018-12-09 PROCEDURE — 72125 CT NECK SPINE W/O DYE: CPT

## 2018-12-09 PROCEDURE — 6370000000 HC RX 637 (ALT 250 FOR IP): Performed by: EMERGENCY MEDICINE

## 2018-12-09 PROCEDURE — 70450 CT HEAD/BRAIN W/O DYE: CPT

## 2018-12-09 PROCEDURE — 99284 EMERGENCY DEPT VISIT MOD MDM: CPT

## 2018-12-09 RX ORDER — ACETAMINOPHEN 500 MG
1000 TABLET ORAL ONCE
Status: COMPLETED | OUTPATIENT
Start: 2018-12-09 | End: 2018-12-09

## 2018-12-09 RX ADMIN — ACETAMINOPHEN 1000 MG: 500 TABLET ORAL at 15:44

## 2018-12-09 ASSESSMENT — PAIN DESCRIPTION - LOCATION: LOCATION: HEAD

## 2018-12-09 ASSESSMENT — PAIN DESCRIPTION - PAIN TYPE: TYPE: ACUTE PAIN

## 2018-12-09 ASSESSMENT — PAIN SCALES - GENERAL
PAINLEVEL_OUTOF10: 5
PAINLEVEL_OUTOF10: 5

## 2018-12-09 ASSESSMENT — PAIN DESCRIPTION - DESCRIPTORS: DESCRIPTORS: ACHING

## 2018-12-10 NOTE — ED PROVIDER NOTES
[facility]  ED  eMERGENCY dEPARTMENT eNCOUnter        Pt Name: Rene Steward  MRN: [id number]  Joaquim 1935  Date of evaluation: 12/9/2018  Provider: Noemi Garland DO  PCP: Abby Garcia MD      CHIEF COMPLAINT       Chief Complaint   Patient presents with    Fall     tripped and fell at grocery store   [address] [facility]   (Location/Symptom, Timing/Onset, Context/Setting, Quality, Duration, Modifying Factors, Severity)  Note limiting factors. Rene Steward is a 80 y.o. female  presents to the emergency department with complaint of Trip and fall over her grocery cart at the grocery store. She hit her head on the freezer door I'll. She had no loss of consciousness. She is on no blood thinners. Her only complaint is of a mild headache. She has not ambulated since the event. She denies any nausea or dizziness or weakness. She denies any chest pain or shortness of breath. She denies abdominal pain. She denies any recent dysuria, fevers or chills. Nursing Notes were all reviewed and agreed with or any disagreements were addressed  in the HPI. REVIEW OF SYSTEMS    (2-9 systems for level 4, 10 or more for level 5)     Review of Systems  REVIEW OF SYSTEMS    Constitutional:  Denies fever, chills, weight loss or weakness   Eyes:  Denies photophobia or discharge   HENT:  Denies sore throat or ear pain   Respiratory:  Denies cough or shortness of breath   Cardiovascular:  Denies chest pain, palpitations or swelling   GI:  Denies abdominal pain, nausea, vomiting, or diarrhea   Musculoskeletal:    Skin:  Denies rash   Neurologic:  Denies  focal weakness or sensory changes   Endocrine:  Denies polyuria or polydypsia   Lymphatic:  Denies swollen glands   Psychiatric:  Denies depression, suicidal ideation or homicidal ideation   All systems negative except as marked. Positives and Pertinent negatives as per HPI.   Except as noted above in Exam: pt fell and hit the back of her head,headache now Acuity: Acute Relevant Medical/Surgical History: positive LOC,hx of ovarian cancer; ORDERING SYSTEM PROVIDED HISTORY: fall, head hit, midline ttp TECHNOLOGIST PROVIDED HISTORY: Ordering Physician Provided Reason for Exam: fall today Acuity: Acute Type of Exam: Initial Relevant Medical/Surgical History: neck pain History of hypertension, ovarian carcinoma FINDINGS: HEAD CT: BRAIN/VENTRICLES: There is no acute intracranial hemorrhage, mass effect or midline shift. No abnormal extra-axial fluid collection. The gray-white differentiation is maintained without evidence of an acute infarct. There is no evidence of hydrocephalus. There is prominence of the extra-axial spaces compatible with central atrophy. There is CSF attenuation within the medial aspect of the anterior cranial fossa adjacent to left frontal lobe most compatible with an arachnoid cyst. There is moderate patchy nonspecific abnormal low attenuation within the periventricular and subcortical white matter, likely representing chronic ischemic microvascular change. ORBITS: The visualized portion of the orbits demonstrate no acute abnormality. SINUSES: There is mild bilateral ethmoid sinus mucosal thickening. The remaining visualized paranasal sinuses and mastoid air cells are clear. SOFT TISSUES/SKULL:  No acute abnormality of the visualized skull or soft tissues. CERVICAL SPINE CT: BONES/ALIGNMENT: There is minimal 1-2 mm anterolisthesis at C7-T1 which was also present on the prior study likely degenerative. No acute fracture of the cervical spine is evident. There is or fracture of the T4 vertebral body with sclerotic fracture line which extends from the anterior to the posterior cortex, not involving the posterior elements. No retropulsion of posterior cortex. There is approximately 40% loss of anterior vertebral body height. The fracture is new since June 2018. No large paraspinal hematoma.

## 2019-04-18 ENCOUNTER — HOSPITAL ENCOUNTER (INPATIENT)
Age: 84
LOS: 2 days | Discharge: HOME | DRG: 641 | End: 2019-04-20
Attending: EMERGENCY MEDICINE | Admitting: INTERNAL MEDICINE
Payer: MEDICARE

## 2019-04-18 ENCOUNTER — APPOINTMENT (OUTPATIENT)
Dept: CT IMAGING | Age: 84
DRG: 641 | End: 2019-04-18
Payer: MEDICARE

## 2019-04-18 DIAGNOSIS — R10.9 CHRONIC ABDOMINAL PAIN: ICD-10-CM

## 2019-04-18 DIAGNOSIS — N17.9 AKI (ACUTE KIDNEY INJURY) (HCC): ICD-10-CM

## 2019-04-18 DIAGNOSIS — E87.1 HYPONATREMIA: Primary | ICD-10-CM

## 2019-04-18 DIAGNOSIS — G89.29 CHRONIC ABDOMINAL PAIN: ICD-10-CM

## 2019-04-18 LAB
A/G RATIO: 1.5 (ref 1.1–2.2)
ALBUMIN SERPL-MCNC: 3.8 G/DL (ref 3.4–5)
ALP BLD-CCNC: 91 U/L (ref 40–129)
ALT SERPL-CCNC: 14 U/L (ref 10–40)
ANION GAP SERPL CALCULATED.3IONS-SCNC: 16 MMOL/L (ref 3–16)
AST SERPL-CCNC: 20 U/L (ref 15–37)
BACTERIA: ABNORMAL /HPF
BASOPHILS ABSOLUTE: 0.1 K/UL (ref 0–0.2)
BASOPHILS RELATIVE PERCENT: 1 %
BILIRUB SERPL-MCNC: 0.3 MG/DL (ref 0–1)
BILIRUBIN URINE: NEGATIVE
BLOOD, URINE: NEGATIVE
BUN BLDV-MCNC: 22 MG/DL (ref 7–20)
CALCIUM SERPL-MCNC: 9 MG/DL (ref 8.3–10.6)
CHLORIDE BLD-SCNC: 83 MMOL/L (ref 99–110)
CLARITY: CLEAR
CO2: 20 MMOL/L (ref 21–32)
COLOR: YELLOW
CREAT SERPL-MCNC: 1.1 MG/DL (ref 0.6–1.2)
EOSINOPHILS ABSOLUTE: 0.5 K/UL (ref 0–0.6)
EOSINOPHILS RELATIVE PERCENT: 4.1 %
EPITHELIAL CELLS, UA: ABNORMAL /HPF
GFR AFRICAN AMERICAN: 57
GFR NON-AFRICAN AMERICAN: 47
GLOBULIN: 2.5 G/DL
GLUCOSE BLD-MCNC: 167 MG/DL (ref 70–99)
GLUCOSE URINE: NEGATIVE MG/DL
HCT VFR BLD CALC: 36.2 % (ref 36–48)
HEMOGLOBIN: 12.3 G/DL (ref 12–16)
KETONES, URINE: NEGATIVE MG/DL
LEUKOCYTE ESTERASE, URINE: ABNORMAL
LIPASE: 81 U/L (ref 13–60)
LYMPHOCYTES ABSOLUTE: 2.1 K/UL (ref 1–5.1)
LYMPHOCYTES RELATIVE PERCENT: 17.4 %
MAGNESIUM: 2 MG/DL (ref 1.8–2.4)
MCH RBC QN AUTO: 30.6 PG (ref 26–34)
MCHC RBC AUTO-ENTMCNC: 34 G/DL (ref 31–36)
MCV RBC AUTO: 90.2 FL (ref 80–100)
MICROSCOPIC EXAMINATION: YES
MONOCYTES ABSOLUTE: 1 K/UL (ref 0–1.3)
MONOCYTES RELATIVE PERCENT: 8.8 %
NEUTROPHILS ABSOLUTE: 8.2 K/UL (ref 1.7–7.7)
NEUTROPHILS RELATIVE PERCENT: 68.7 %
NITRITE, URINE: NEGATIVE
PDW BLD-RTO: 13.5 % (ref 12.4–15.4)
PH UA: 6 (ref 5–8)
PHOSPHORUS: 3.3 MG/DL (ref 2.5–4.9)
PLATELET # BLD: 282 K/UL (ref 135–450)
PMV BLD AUTO: 7.7 FL (ref 5–10.5)
POTASSIUM SERPL-SCNC: 3.8 MMOL/L (ref 3.5–5.1)
PROTEIN UA: NEGATIVE MG/DL
RBC # BLD: 4.01 M/UL (ref 4–5.2)
RBC UA: ABNORMAL /HPF (ref 0–2)
SODIUM BLD-SCNC: 119 MMOL/L (ref 136–145)
SPECIFIC GRAVITY UA: <=1.005 (ref 1–1.03)
TOTAL PROTEIN: 6.3 G/DL (ref 6.4–8.2)
URINE TYPE: ABNORMAL
UROBILINOGEN, URINE: 0.2 E.U./DL
WBC # BLD: 11.9 K/UL (ref 4–11)
WBC UA: ABNORMAL /HPF (ref 0–5)

## 2019-04-18 PROCEDURE — 96374 THER/PROPH/DIAG INJ IV PUSH: CPT

## 2019-04-18 PROCEDURE — 6360000004 HC RX CONTRAST MEDICATION: Performed by: PHYSICIAN ASSISTANT

## 2019-04-18 PROCEDURE — 87086 URINE CULTURE/COLONY COUNT: CPT

## 2019-04-18 PROCEDURE — 81001 URINALYSIS AUTO W/SCOPE: CPT

## 2019-04-18 PROCEDURE — 2500000003 HC RX 250 WO HCPCS: Performed by: PHYSICIAN ASSISTANT

## 2019-04-18 PROCEDURE — 85025 COMPLETE CBC W/AUTO DIFF WBC: CPT

## 2019-04-18 PROCEDURE — 83690 ASSAY OF LIPASE: CPT

## 2019-04-18 PROCEDURE — 2000000000 HC ICU R&B

## 2019-04-18 PROCEDURE — 74177 CT ABD & PELVIS W/CONTRAST: CPT

## 2019-04-18 PROCEDURE — 99284 EMERGENCY DEPT VISIT MOD MDM: CPT

## 2019-04-18 PROCEDURE — 87186 SC STD MICRODIL/AGAR DIL: CPT

## 2019-04-18 PROCEDURE — 87077 CULTURE AEROBIC IDENTIFY: CPT

## 2019-04-18 PROCEDURE — 2580000003 HC RX 258: Performed by: PHYSICIAN ASSISTANT

## 2019-04-18 PROCEDURE — 96361 HYDRATE IV INFUSION ADD-ON: CPT

## 2019-04-18 PROCEDURE — 6370000000 HC RX 637 (ALT 250 FOR IP): Performed by: PHYSICIAN ASSISTANT

## 2019-04-18 PROCEDURE — 84100 ASSAY OF PHOSPHORUS: CPT

## 2019-04-18 PROCEDURE — 36415 COLL VENOUS BLD VENIPUNCTURE: CPT

## 2019-04-18 PROCEDURE — 96375 TX/PRO/DX INJ NEW DRUG ADDON: CPT

## 2019-04-18 PROCEDURE — 84443 ASSAY THYROID STIM HORMONE: CPT

## 2019-04-18 PROCEDURE — 80053 COMPREHEN METABOLIC PANEL: CPT

## 2019-04-18 PROCEDURE — 94762 N-INVAS EAR/PLS OXIMTRY CONT: CPT

## 2019-04-18 PROCEDURE — 6360000002 HC RX W HCPCS: Performed by: PHYSICIAN ASSISTANT

## 2019-04-18 PROCEDURE — 83735 ASSAY OF MAGNESIUM: CPT

## 2019-04-18 RX ORDER — SODIUM CHLORIDE 9 MG/ML
INJECTION, SOLUTION INTRAVENOUS CONTINUOUS
Status: DISCONTINUED | OUTPATIENT
Start: 2019-04-19 | End: 2019-04-19

## 2019-04-18 RX ORDER — 0.9 % SODIUM CHLORIDE 0.9 %
500 INTRAVENOUS SOLUTION INTRAVENOUS ONCE
Status: COMPLETED | OUTPATIENT
Start: 2019-04-18 | End: 2019-04-18

## 2019-04-18 RX ORDER — PANTOPRAZOLE SODIUM 40 MG/1
40 TABLET, DELAYED RELEASE ORAL
Status: DISCONTINUED | OUTPATIENT
Start: 2019-04-19 | End: 2019-04-20 | Stop reason: HOSPADM

## 2019-04-18 RX ORDER — HYDRALAZINE HYDROCHLORIDE 50 MG/1
50 TABLET, FILM COATED ORAL 3 TIMES DAILY
Status: DISCONTINUED | OUTPATIENT
Start: 2019-04-19 | End: 2019-04-20 | Stop reason: HOSPADM

## 2019-04-18 RX ORDER — ONDANSETRON 2 MG/ML
4 INJECTION INTRAMUSCULAR; INTRAVENOUS EVERY 6 HOURS PRN
Status: DISCONTINUED | OUTPATIENT
Start: 2019-04-18 | End: 2019-04-20 | Stop reason: HOSPADM

## 2019-04-18 RX ORDER — METOPROLOL SUCCINATE 50 MG/1
100 TABLET, EXTENDED RELEASE ORAL DAILY
Status: DISCONTINUED | OUTPATIENT
Start: 2019-04-19 | End: 2019-04-20 | Stop reason: HOSPADM

## 2019-04-18 RX ORDER — ALPRAZOLAM 0.25 MG/1
0.25 TABLET ORAL 2 TIMES DAILY PRN
Status: DISCONTINUED | OUTPATIENT
Start: 2019-04-18 | End: 2019-04-20 | Stop reason: HOSPADM

## 2019-04-18 RX ORDER — OXYCODONE HYDROCHLORIDE AND ACETAMINOPHEN 5; 325 MG/1; MG/1
1 TABLET ORAL EVERY 8 HOURS PRN
Status: DISCONTINUED | OUTPATIENT
Start: 2019-04-18 | End: 2019-04-20

## 2019-04-18 RX ORDER — SODIUM CHLORIDE 0.9 % (FLUSH) 0.9 %
10 SYRINGE (ML) INJECTION PRN
Status: DISCONTINUED | OUTPATIENT
Start: 2019-04-18 | End: 2019-04-20 | Stop reason: HOSPADM

## 2019-04-18 RX ORDER — LOSARTAN POTASSIUM 100 MG/1
100 TABLET ORAL DAILY
Status: DISCONTINUED | OUTPATIENT
Start: 2019-04-19 | End: 2019-04-20 | Stop reason: HOSPADM

## 2019-04-18 RX ORDER — ACETAMINOPHEN 325 MG/1
650 TABLET ORAL EVERY 4 HOURS PRN
Status: DISCONTINUED | OUTPATIENT
Start: 2019-04-18 | End: 2019-04-20 | Stop reason: HOSPADM

## 2019-04-18 RX ORDER — ONDANSETRON 2 MG/ML
4 INJECTION INTRAMUSCULAR; INTRAVENOUS EVERY 30 MIN PRN
Status: DISCONTINUED | OUTPATIENT
Start: 2019-04-18 | End: 2019-04-18

## 2019-04-18 RX ORDER — MORPHINE SULFATE 4 MG/ML
4 INJECTION, SOLUTION INTRAMUSCULAR; INTRAVENOUS ONCE
Status: COMPLETED | OUTPATIENT
Start: 2019-04-18 | End: 2019-04-18

## 2019-04-18 RX ORDER — LEVOTHYROXINE SODIUM 0.1 MG/1
100 TABLET ORAL DAILY
Status: DISCONTINUED | OUTPATIENT
Start: 2019-04-19 | End: 2019-04-20 | Stop reason: HOSPADM

## 2019-04-18 RX ORDER — SODIUM CHLORIDE 0.9 % (FLUSH) 0.9 %
10 SYRINGE (ML) INJECTION EVERY 12 HOURS SCHEDULED
Status: DISCONTINUED | OUTPATIENT
Start: 2019-04-19 | End: 2019-04-20 | Stop reason: HOSPADM

## 2019-04-18 RX ADMIN — ONDANSETRON 4 MG: 2 INJECTION INTRAMUSCULAR; INTRAVENOUS at 19:20

## 2019-04-18 RX ADMIN — LIDOCAINE HYDROCHLORIDE: 20 SOLUTION ORAL; TOPICAL at 19:20

## 2019-04-18 RX ADMIN — SODIUM CHLORIDE 500 ML: 9 INJECTION, SOLUTION INTRAVENOUS at 20:27

## 2019-04-18 RX ADMIN — IOPAMIDOL 75 ML: 755 INJECTION, SOLUTION INTRAVENOUS at 19:46

## 2019-04-18 RX ADMIN — MORPHINE SULFATE 4 MG: 4 INJECTION INTRAVENOUS at 19:20

## 2019-04-18 RX ADMIN — FAMOTIDINE 20 MG: 10 INJECTION, SOLUTION INTRAVENOUS at 19:20

## 2019-04-18 ASSESSMENT — ENCOUNTER SYMPTOMS
SORE THROAT: 0
ABDOMINAL PAIN: 1
NAUSEA: 0
EYE REDNESS: 0
RHINORRHEA: 0
FACIAL SWELLING: 0
CHEST TIGHTNESS: 0
EYE PAIN: 0
SHORTNESS OF BREATH: 0
DIARRHEA: 1
BACK PAIN: 0
COUGH: 0
CONSTIPATION: 0

## 2019-04-18 ASSESSMENT — PAIN DESCRIPTION - DESCRIPTORS: DESCRIPTORS: CONSTANT;CRAMPING

## 2019-04-18 ASSESSMENT — PAIN SCALES - GENERAL
PAINLEVEL_OUTOF10: 10
PAINLEVEL_OUTOF10: 10
PAINLEVEL_OUTOF10: 7

## 2019-04-18 ASSESSMENT — PAIN DESCRIPTION - PAIN TYPE: TYPE: ACUTE PAIN

## 2019-04-18 ASSESSMENT — PAIN DESCRIPTION - LOCATION: LOCATION: ABDOMEN

## 2019-04-18 NOTE — ED NOTES
Pt reports pain to abdomen and back for \" a long time now\", Pt states \"it seems like there is nothing anyone can do for the pain. \" Pt reports pain is constant but with times of increased back pain.       Neal Macedo RN  04/18/19 6887

## 2019-04-19 PROBLEM — K59.1 FUNCTIONAL DIARRHEA: Status: ACTIVE | Noted: 2019-04-19

## 2019-04-19 LAB
ALBUMIN SERPL-MCNC: 3.5 G/DL (ref 3.4–5)
ALBUMIN SERPL-MCNC: 3.5 G/DL (ref 3.4–5)
ALBUMIN SERPL-MCNC: 3.7 G/DL (ref 3.4–5)
ALBUMIN SERPL-MCNC: 4 G/DL (ref 3.4–5)
ANION GAP SERPL CALCULATED.3IONS-SCNC: 10 MMOL/L (ref 3–16)
ANION GAP SERPL CALCULATED.3IONS-SCNC: 10 MMOL/L (ref 3–16)
ANION GAP SERPL CALCULATED.3IONS-SCNC: 11 MMOL/L (ref 3–16)
ANION GAP SERPL CALCULATED.3IONS-SCNC: 12 MMOL/L (ref 3–16)
BUN BLDV-MCNC: 15 MG/DL (ref 7–20)
BUN BLDV-MCNC: 16 MG/DL (ref 7–20)
BUN BLDV-MCNC: 17 MG/DL (ref 7–20)
BUN BLDV-MCNC: 19 MG/DL (ref 7–20)
CALCIUM SERPL-MCNC: 8.4 MG/DL (ref 8.3–10.6)
CALCIUM SERPL-MCNC: 8.6 MG/DL (ref 8.3–10.6)
CALCIUM SERPL-MCNC: 8.8 MG/DL (ref 8.3–10.6)
CALCIUM SERPL-MCNC: 8.9 MG/DL (ref 8.3–10.6)
CHLORIDE BLD-SCNC: 89 MMOL/L (ref 99–110)
CHLORIDE BLD-SCNC: 91 MMOL/L (ref 99–110)
CHLORIDE URINE RANDOM: 27 MMOL/L
CO2: 23 MMOL/L (ref 21–32)
CO2: 25 MMOL/L (ref 21–32)
CREAT SERPL-MCNC: 0.9 MG/DL (ref 0.6–1.2)
CREAT SERPL-MCNC: 1 MG/DL (ref 0.6–1.2)
GFR AFRICAN AMERICAN: >60
GFR NON-AFRICAN AMERICAN: 53
GFR NON-AFRICAN AMERICAN: 60
GLUCOSE BLD-MCNC: 155 MG/DL (ref 70–99)
GLUCOSE BLD-MCNC: 164 MG/DL (ref 70–99)
GLUCOSE BLD-MCNC: 173 MG/DL (ref 70–99)
GLUCOSE BLD-MCNC: 208 MG/DL (ref 70–99)
PHOSPHORUS: 2.6 MG/DL (ref 2.5–4.9)
PHOSPHORUS: 2.6 MG/DL (ref 2.5–4.9)
PHOSPHORUS: 2.9 MG/DL (ref 2.5–4.9)
PHOSPHORUS: 3.2 MG/DL (ref 2.5–4.9)
POTASSIUM SERPL-SCNC: 3.7 MMOL/L (ref 3.5–5.1)
POTASSIUM SERPL-SCNC: 3.7 MMOL/L (ref 3.5–5.1)
POTASSIUM SERPL-SCNC: 3.9 MMOL/L (ref 3.5–5.1)
POTASSIUM SERPL-SCNC: 4 MMOL/L (ref 3.5–5.1)
POTASSIUM, UR: 14.9 MMOL/L
SODIUM BLD-SCNC: 124 MMOL/L (ref 136–145)
SODIUM BLD-SCNC: 124 MMOL/L (ref 136–145)
SODIUM BLD-SCNC: 125 MMOL/L (ref 136–145)
SODIUM BLD-SCNC: 126 MMOL/L (ref 136–145)
SODIUM URINE: 25 MMOL/L
TSH REFLEX: 4.09 UIU/ML (ref 0.27–4.2)

## 2019-04-19 PROCEDURE — 6360000002 HC RX W HCPCS: Performed by: INTERNAL MEDICINE

## 2019-04-19 PROCEDURE — 2580000003 HC RX 258: Performed by: INTERNAL MEDICINE

## 2019-04-19 PROCEDURE — 6370000000 HC RX 637 (ALT 250 FOR IP): Performed by: INTERNAL MEDICINE

## 2019-04-19 PROCEDURE — 36415 COLL VENOUS BLD VENIPUNCTURE: CPT

## 2019-04-19 PROCEDURE — 2060000000 HC ICU INTERMEDIATE R&B

## 2019-04-19 PROCEDURE — 99223 1ST HOSP IP/OBS HIGH 75: CPT | Performed by: INTERNAL MEDICINE

## 2019-04-19 PROCEDURE — 2500000003 HC RX 250 WO HCPCS: Performed by: INTERNAL MEDICINE

## 2019-04-19 PROCEDURE — 82436 ASSAY OF URINE CHLORIDE: CPT

## 2019-04-19 PROCEDURE — 80069 RENAL FUNCTION PANEL: CPT

## 2019-04-19 PROCEDURE — 83935 ASSAY OF URINE OSMOLALITY: CPT

## 2019-04-19 PROCEDURE — 99232 SBSQ HOSP IP/OBS MODERATE 35: CPT | Performed by: INTERNAL MEDICINE

## 2019-04-19 PROCEDURE — 84300 ASSAY OF URINE SODIUM: CPT

## 2019-04-19 PROCEDURE — 84133 ASSAY OF URINE POTASSIUM: CPT

## 2019-04-19 RX ORDER — LABETALOL HYDROCHLORIDE 5 MG/ML
10 INJECTION, SOLUTION INTRAVENOUS EVERY 6 HOURS PRN
Status: DISCONTINUED | OUTPATIENT
Start: 2019-04-19 | End: 2019-04-20 | Stop reason: HOSPADM

## 2019-04-19 RX ORDER — LIDOCAINE HYDROCHLORIDE 40 MG/ML
SOLUTION TOPICAL
Status: DISPENSED
Start: 2019-04-19 | End: 2019-04-20

## 2019-04-19 RX ORDER — SODIUM CHLORIDE 9 MG/ML
INJECTION, SOLUTION INTRAVENOUS CONTINUOUS
Status: DISCONTINUED | OUTPATIENT
Start: 2019-04-19 | End: 2019-04-20

## 2019-04-19 RX ADMIN — ACETAMINOPHEN 650 MG: 325 TABLET ORAL at 18:49

## 2019-04-19 RX ADMIN — MUPIROCIN: 20 OINTMENT TOPICAL at 09:23

## 2019-04-19 RX ADMIN — HYDRALAZINE HYDROCHLORIDE 50 MG: 50 TABLET, FILM COATED ORAL at 20:28

## 2019-04-19 RX ADMIN — LABETALOL HYDROCHLORIDE 10 MG: 5 INJECTION INTRAVENOUS at 17:16

## 2019-04-19 RX ADMIN — CEFTRIAXONE SODIUM 1 G: 1 INJECTION, POWDER, FOR SOLUTION INTRAMUSCULAR; INTRAVENOUS at 07:09

## 2019-04-19 RX ADMIN — PANTOPRAZOLE SODIUM 40 MG: 40 TABLET, DELAYED RELEASE ORAL at 09:23

## 2019-04-19 RX ADMIN — MUPIROCIN: 20 OINTMENT TOPICAL at 20:28

## 2019-04-19 RX ADMIN — LEVOTHYROXINE SODIUM 100 MCG: 100 TABLET ORAL at 09:23

## 2019-04-19 RX ADMIN — SODIUM CHLORIDE: 9 INJECTION, SOLUTION INTRAVENOUS at 00:16

## 2019-04-19 RX ADMIN — HYDRALAZINE HYDROCHLORIDE 50 MG: 50 TABLET, FILM COATED ORAL at 00:15

## 2019-04-19 RX ADMIN — Medication 10 ML: at 20:28

## 2019-04-19 RX ADMIN — METOPROLOL SUCCINATE 100 MG: 50 TABLET, EXTENDED RELEASE ORAL at 09:23

## 2019-04-19 RX ADMIN — LOSARTAN POTASSIUM 100 MG: 100 TABLET ORAL at 09:24

## 2019-04-19 RX ADMIN — ENOXAPARIN SODIUM 40 MG: 40 INJECTION SUBCUTANEOUS at 09:23

## 2019-04-19 RX ADMIN — HYDRALAZINE HYDROCHLORIDE 50 MG: 50 TABLET, FILM COATED ORAL at 09:24

## 2019-04-19 RX ADMIN — ALPRAZOLAM 0.25 MG: 0.25 TABLET ORAL at 18:49

## 2019-04-19 RX ADMIN — SODIUM CHLORIDE: 9 INJECTION, SOLUTION INTRAVENOUS at 13:28

## 2019-04-19 RX ADMIN — MAGNESIUM HYDROXIDE 30 ML: 400 SUSPENSION ORAL at 20:28

## 2019-04-19 RX ADMIN — DEXTROSE MONOHYDRATE: 50 INJECTION, SOLUTION INTRAVENOUS at 04:51

## 2019-04-19 RX ADMIN — HYDRALAZINE HYDROCHLORIDE 50 MG: 50 TABLET, FILM COATED ORAL at 13:28

## 2019-04-19 ASSESSMENT — PAIN SCALES - GENERAL
PAINLEVEL_OUTOF10: 1
PAINLEVEL_OUTOF10: 0
PAINLEVEL_OUTOF10: 3

## 2019-04-19 NOTE — CONSULTS
[facility]    Consult Note           Reason for Consult:  hyponatremia  Requesting Physician:  Dr. Darion Iverson    Chief Complaint:  abd pain, diarrhea and nausea    History of Present Illness on 4/19/19:    80 y.o. yo female with PMH of irritable bowel syndrome, hypothyroidism, depression, chronic hyponatremia (low 130s) who is admitted for acute hyponatremia of 119  Pt has been having abd pain for several months but lately get worse, had diarrhea and felt nauseous as well; she came to Ed and was given 500 ml of iv ns bolus along with 75 ml/h after which na increased to 126 at which time it was stopped ( 0455) and 1l d5w has been given; since then na has come down to 124  She c/o feeling better and wants to drink this am    Past Medical History:        Diagnosis Date    Acute bilateral low back pain with bilateral sciatica 6/8/2017    Anxiety     Cancer ([facility] [address]     ovarian cancer    Degeneration of lumbar or lumbosacral intervertebral disc 5/7/2018    Depression     Hypertension     Hypothyroid 2/20/2017    Thyroid disease        Past Surgical History:        Procedure Laterality Date    HYSTERECTOMY      THYROIDECTOMY         Home Medications:    No current facility-administered medications on file prior to encounter.       Current Outpatient Medications on File Prior to Encounter   Medication Sig Dispense Refill    losartan (COZAAR) 100 MG tablet TAKE 1 TABLET EVERY DAY 90 tablet 1    metoprolol succinate (TOPROL XL) 100 MG extended release tablet TAKE 1 TABLET EVERY DAY 90 tablet 1    omeprazole (PRILOSEC) 20 MG delayed release capsule TAKE 1 CAPSULE EVERY DAY 90 capsule 1    levothyroxine (SYNTHROID) 100 MCG tablet Take 1 tablet by mouth Daily 90 tablet 1    hydrALAZINE (APRESOLINE) 50 MG tablet Take 1 tablet by mouth 3 times daily 270 tablet 3    ALPRAZolam (XANAX) 0.25 MG tablet Take 1 tablet by mouth 2 times daily as needed for Anxiety (Patient taking differently: Take 0.5 mg by mouth 2 times daily as needed for Anxiety. ) 180 tablet 1    oxyCODONE-acetaminophen (PERCOCET) 5-325 MG per tablet Take 1 tablet by mouth 3 times daily as needed for Pain for up to 30 days. Sergio West Date: 3/23/19 90 tablet 0       Allergies:  Amlodipine besylate; Demerol hcl [meperidine]; and Lisinopril    Social History:    Social History     Socioeconomic History    Marital status:      Spouse name: Not on file    Number of children: Not on file    Years of education: Not on file    Highest education level: Not on file   Occupational History    Not on file   Social Needs    Financial resource strain: Not on file    Food insecurity:     Worry: Not on file     Inability: Not on file    Transportation needs:     Medical: Not on file     Non-medical: Not on file   Tobacco Use    Smoking status: Former Smoker    Smokeless tobacco: Never Used   Substance and Sexual Activity    Alcohol use: No    Drug use: No    Sexual activity: Not on file   Lifestyle    Physical activity:     Days per week: Not on file     Minutes per session: Not on file    Stress: Not on file   Relationships    Social connections:     Talks on phone: Not on file     Gets together: Not on file     Attends Protestant service: Not on file     Active member of club or organization: Not on file     Attends meetings of clubs or organizations: Not on file     Relationship status: Not on file    Intimate partner violence:     Fear of current or ex partner: Not on file     Emotionally abused: Not on file     Physically abused: Not on file     Forced sexual activity: Not on file   Other Topics Concern    Not on file   Social History Narrative    Not on file       Family History:   Family History   Problem Relation Age of Onset    Cancer Brother        Review of Systems:   Pertinent positives stated above in HPI. All other 10 systems were reviewed and were negative.      Physical exam:   Constitutional:  VITALS:  BP (!) 153/64   Pulse 66   Temp 97.8 °F (36.6 °C) (Oral)   Resp 11   Ht 5' 5\" (1.651 m)   Wt 115 lb 12.8 oz (52.5 kg)   SpO2 100%   BMI 19.27 kg/m²   Gen: alert, awake, nad  Skin: no rash, turgor wnl  Heent:  eomi, mmm  Neck: no bruits or jvd noted, thyroid normal  Cardiovascular:  S1, S2 without m/r/g  Respiratory: CTA B without w/r/r; respiratory effort normal  Abdomen:  +bs, soft, nt, nd, no hepatosplenomegaly  Ext: no lower extremity edema  Neuro/Psy: AAoriented times 3 ; moves all 4 ext  Musculoskeletal:  Rom, muscular strength intact; digits, nails normal    Data/  Recent Labs     04/18/19  1840   WBC 11.9*   HGB 12.3   HCT 36.2   MCV 90.2        Recent Labs     04/18/19  1840 04/19/19  0102 04/19/19  0607 04/19/19  0945   * 126* 124* 124*   K 3.8 4.0 3.7 3.7   CL 83* 91* 91* 89*   CO2 20* 25 23 23   GLUCOSE 167* 155* 173* 164*   PHOS 3.3 3.2 2.9 2.6   MG 2.00  --   --   --    BUN 22* 19 16 15   CREATININE 1.1 1.0 0.9 0.9   LABGLOM 47* 53* 60* 60*   GFRAA 57* >60 >60 >60     CT scan a/p w iv contrast   FINDINGS:   Motion artifact degrades many of the images.       Lower Chest: Minimal dependent lower lobe atelectasis.       Organs: The liver, spleen, pancreas, adrenal glands, gallbladder, and kidneys   show no acute abnormality.  The upper poles of both kidneys, greater on the   left, are obscured by motion artifact.       GI/Bowel: Portions of the bowel are obscured by motion artifact.  No   significantly dilated loops of bowel or definite bowel wall thickening.  The   appendix is upper limits in caliber measuring 8 mm, but tapers distally.  No   large amount of pericecal stranding is present. Ashly Terry are large number of   sigmoid colonic diverticula without evidence of active inflammation.       Pelvis:  The urinary bladder is nondistended.  The uterus is surgically absent.       Peritoneum/Retroperitoneum: The aorta is normal in caliber and course,   showing calcifications.       Bones/Soft

## 2019-04-19 NOTE — ED NOTES
2046- Consult placed to hospitalist Dr. Robby Hernandez for Nel BURCIAGA   2053- Dr. Robby Hernandez returned page and spoke to Nel Purdy  04/18/19 2047       Mel Purdy  04/18/19 2053

## 2019-04-19 NOTE — CARE COORDINATION
Case Management Assessment  Initial Evaluation    Date/Time of Evaluation: 4/19/2019 10:54 AM  Assessment Completed by: Yashira Blankenship    Patient Name: Enrike Serrano  YOB: 1935  Diagnosis: Hyponatremia [E87.1]  Date / Time: 4/18/2019  6:24 PM  Admission status/Date: inpatient 04/19/2019  Chart Reviewed: Yes      Patient Interviewed: Yes   Family Interviewed:  No      Hospitalization in the last 30 days:  No    Contacts  :     Relationship to Patient:   Phone Number:    Alternate Contact:     Relationship to Patient:     Phone Number:    Met with:    Current PCP Dr. Crystal Keller required for SNF : Y       3 night stay required - [employer]  Support Systems: Children, Family Members  Transportation: sons provide transportation    Meal Preparation: self    Housing  Home Environment: resides alone in her home, basement  Steps: basement steps-states she does not go to the basement, steps to entry x 3  Plans to Return to Present Housing: Yes  Other Identified Issues: none     Justin Sabamonty Kanu  Currently active with [address] : No     Passport/Waiver : No  :                      Phone Number:    Passport/Waiver Services: none          Durable Medical Equipment   DME Provider: none   Equipment: Walker_x_Cane__RTS__ BSC__Shower Chair__  02__ HHN__ CPAP__  BiPap__  Hospital Bed__ W/C___ Other__________      Has Home O2 in place on admit:  No  Informed of need to bring portable home O2 tank on day of discharge for nursing to connect prior to leaving:   Not Indicated  Verbalized agreement/Understanding:   Not Indicated    Community Service Affiliation  Dialysis:  No    · Name:  · Location  · Dialysis Schedule:  · Phone:   · Fax:     Outpatient PT/OT: No    Cancer Center: No     CHF Clinic: No     Pulmonary Rehab: No  Pain Clinic: Yes - Clinic at [address]: No    Wound Clinic: No     Other: none     DISCHARGE PLAN: Chart reviewed. Spoke with the patient at the bedside. Explained the role of the RN Case Manager, voices understanding. Patient reports that she resides in her home alone, has 2 sons that are supportive and provide assistance when needed, transportation, etc. She has basement steps that she \"never uses\" and 3 steps to entry. She states that she is IADL, uses a walker occasionally. She plans to return home following her hospital stay. CM will follow. Explained Case Management role/services.

## 2019-04-19 NOTE — PROGRESS NOTES
Progress Note    Admit Date:  4/18/2019     admitted with hyponatremia     Subjective:  Ms. Jitendra Ernst  Is better, Na levels better . up , out of bed , upto commode. very weak, frail, well oriented . no distress . BP trending Up, home meds resumed      no diarrhea     Objective:   BP (!) 147/61   Pulse 69   Temp 97.8 °F (36.6 °C) (Oral)   Resp 14   Ht 5' 5\" (1.651 m)   Wt 115 lb 12.8 oz (52.5 kg)   SpO2 100%   BMI 19.27 kg/m²       Intake/Output Summary (Last 24 hours) at 4/19/2019 1503  Last data filed at 4/19/2019 1501  Gross per 24 hour   Intake 888 ml   Output 2000 ml   Net -1112 ml         Physical Exam:  General:  Awake, alert, NAD   thin, frail, fatigued   Skin:  Warm and dry  Neck:  JVD absent. Neck supple  Chest:  Clear to auscultation, respiration easy. No wheezes, rales or rhonchi. Cardiovascular:  RRR ,S1S2 normal  Abdomen:  Soft, non tender, non distended, BS +  Extremities:  No edema. Intact peripheral pulses. Brisk cap refill, < 2 secs  Neuro: non focal      Medications:   Scheduled Meds:   IV infusion builder   Intravenous Once    cefTRIAXone (ROCEPHIN) IV  1 g Intravenous Q24H    mupirocin   Nasal BID    lidocaine        hydrALAZINE  50 mg Oral TID    levothyroxine  100 mcg Oral Daily    losartan  100 mg Oral Daily    metoprolol succinate  100 mg Oral Daily    pantoprazole  40 mg Oral QAM AC    sodium chloride flush  10 mL Intravenous 2 times per day    enoxaparin  40 mg Subcutaneous Daily       Continuous Infusions:   sodium chloride 50 mL/hr at 04/19/19 1328       Data:  CBC:   Recent Labs     04/18/19  1840   WBC 11.9*   RBC 4.01   HGB 12.3   HCT 36.2   MCV 90.2   RDW 13.5        BMP:   Recent Labs     04/19/19  0102 04/19/19  0607 04/19/19  0945   * 124* 124*   K 4.0 3.7 3.7   CL 91* 91* 89*   CO2 25 23 23   PHOS 3.2 2.9 2.6   BUN 19 16 15   CREATININE 1.0 0.9 0.9     BNP: No results for input(s): BNP in the last 72 hours.   PT/INR: No results for input(s): PROTIME, INR in the last 72 hours. APTT: No results for input(s): APTT in the last 72 hours. CARDIAC ENZYMES: No results for input(s): CKMB, CKMBINDEX, TROPONINI in the last 72 hours. Invalid input(s): CKTOTAL;3  FASTING LIPID PANEL:  Lab Results   Component Value Date    CHOL 184 06/30/2016    HDL 53 06/30/2016    TRIG 71 06/30/2016     LIVER PROFILE:   Recent Labs     04/18/19  1840   AST 20   ALT 14   BILITOT 0.3   ALKPHOS 91          CT ABDOMEN PELVIS W IV CONTRAST Additional Contrast? None   Final Result   Small amount of free pelvic fluid. Appendix upper limits of normal in caliber but tapers distally. Acute   appendicitis is considered unlikely but not excluded. Assessment:  Active Problems:    Hyponatremia  Resolved Problems:    * No resolved hospital problems. *      Plan:    1. Hyponatremia,   - acute on chronic ( baseline 130 )  - Na  119 on presentation   - hydrate with NS, decrease rate. DC SSRI, cont fluid restrition . Seen by nephro   - monitor BMP  - TSH normal   - transfer out Of ICU       2. Acute on chronic abdominal pain  - numerous w/u in past, recent scope by her Gi doctor (Dr. Cj Bean). Has had similar waxing and waning pain over the last 3 years. Monitor. 3. HTN - resume home meds . Losartan, metoprolol, hydralazine . - ( hydralazine resumed at a lower dose )     4. UTI, mild but possibly contributing to #2. IV ceftriaxone, day 2, f/u cx.     5. Chronic pain, cont home regimen.     6. Psy, cont home regimen.     DVT Prophylaxis: Lovenox  DIET GENERAL; Daily Fluid Restriction: Dry Tray  Code Status: Full Code         Jo Blanchard MD 4/19/2019 3:03 PM

## 2019-04-19 NOTE — PROGRESS NOTES
Bedside report and Pt care transferred to [facility]. Pt denies any assistance at this time.

## 2019-04-19 NOTE — H&P
100 MCG tablet Take 1 tablet by mouth Daily 4/6/17  Yes Jojo Nelson, DO   hydrALAZINE (APRESOLINE) 50 MG tablet Take 1 tablet by mouth 3 times daily 4/6/17  Yes Jojo Nelson, DO   ALPRAZolam Shan Jodi) 0.25 MG tablet Take 1 tablet by mouth 2 times daily as needed for Anxiety  Patient taking differently: Take 0.5 mg by mouth 2 times daily as needed for Anxiety. 12/8/16  Yes Td Nelson,        Allergies:  Amlodipine besylate; Demerol hcl [meperidine]; and Lisinopril    Social History:    TOBACCO:   reports that she has quit smoking. She has never used smokeless tobacco.  ETOH:   reports that she does not drink alcohol. Family History:  Reviewed in detail and negative for DM, Early CAD, Cancer (except as below). Positive as follows:        Problem Relation Age of Onset    Cancer Brother        REVIEW OF SYSTEMS:   Pertinent positives/negatives as follows: abd pain, diarrhea, and as discussed in HPI, otherwise a complete ROS performed and all other systems are negative  PHYSICAL EXAM PERFORMED:    BP (!) 148/62   Pulse 86   Temp 97.4 °F (36.3 °C) (Temporal)   Resp 17   Ht 5' 5\" (1.651 m)   Wt 128 lb (58.1 kg)   SpO2 98%   BMI 21.30 kg/m²     GEN:  A&Ox3, NAD. HEENT:  NC/AT,EOMI, semi dry MM, no erythema/exudates or visible masses. CVS:  Normal S1,S2. RRR. Without M/G/R.   LUNG:   CTA-B. no wheezes, rales or rhonchi  ABD:  Soft, ND, ep[igastric ttp, BS+ x4. Without G/R.  EXT: 2+ pulses, no c/c/e. Brisk cap refill. PSY:  Thought process intact, affect appropriate. TY:  CN III-XII intact, moves all 4 spontaneously, sensory grossly intact. SKIN: No rash or lesions on visible skin.     Chart review shows recent radiographs:  Ct Abdomen Pelvis W Iv Contrast Additional Contrast? None    Result Date: 4/18/2019  EXAMINATION: CT OF THE ABDOMEN AND PELVIS WITH CONTRAST 4/18/2019 7:52 pm TECHNIQUE: CT of the abdomen and pelvis was performed with the administration of intravenous contrast. Multiplanar reformatted images are provided for review. Dose modulation, iterative reconstruction, and/or weight based adjustment of the mA/kV was utilized to reduce the radiation dose to as low as reasonably achievable. COMPARISON: 06/18/2018 HISTORY: ORDERING SYSTEM PROVIDED HISTORY: Pain TECHNOLOGIST PROVIDED HISTORY: If patient is on cardiac monitor and/or pulse ox, they may be taken off cardiac monitor and pulse ox, left on O2 if currently on. All monitors reattached when patient returns to room. Additional Contrast?->None Ordering Physician Provided Reason for Exam: Abdominal Pain (Pt c/o abdominal pain and back pain that started 3 years ago, Pt reports the pain got worse last night) Acuity: Chronic Type of Exam: Ongoing Relevant Medical/Surgical History: h/o gastritis, IBS, ovarian CA, thyroid disease,  SX: thyroidectomy, hysterectomy History of hysterectomy FINDINGS: Motion artifact degrades many of the images. Lower Chest: Minimal dependent lower lobe atelectasis. Organs: The liver, spleen, pancreas, adrenal glands, gallbladder, and kidneys show no acute abnormality. The upper poles of both kidneys, greater on the left, are obscured by motion artifact. GI/Bowel: Portions of the bowel are obscured by motion artifact. No significantly dilated loops of bowel or definite bowel wall thickening. The appendix is upper limits in caliber measuring 8 mm, but tapers distally. No large amount of pericecal stranding is present. There are large number of sigmoid colonic diverticula without evidence of active inflammation. Pelvis: The urinary bladder is nondistended. The uterus is surgically absent. Peritoneum/Retroperitoneum: The aorta is normal in caliber and course, showing calcifications. Bones/Soft Tissues: Mild degenerative anterolisthesis at L4-5 is again seen. A chronic compression deformity of T12 is also again noted. No acute bony abnormality. Small amount of free pelvic fluid. Small amount of free pelvic fluid. Appendix upper limits of normal in caliber but tapers distally. Acute appendicitis is considered unlikely but not excluded. CBC:  Recent Labs     04/18/19 1840   WBC 11.9*   HGB 12.3   HCT 36.2         RENAL  Recent Labs     04/18/19 1840   *   K 3.8   CL 83*   CO2 20*   BUN 22*   CREATININE 1.1   GLUCOSE 167*     Hemoglobin a1c:  No results found for: LABA1C  LFT'S:  Recent Labs     04/18/19 1840   AST 20   ALT 14   BILITOT 0.3   ALKPHOS 91     U/A:  Recent Labs     04/18/19 2020   LEUKOCYTESUR SMALL*   BACTERIA Rare*   WBCUA 6-10*   COLORU Yellow   RBCUA 0-2   CLARITYU Clear   SPECGRAV <=1.005   BLOODU Negative   GLUCOSEU Negative       PHYSICIAN CERTIFICATION    I certify that Gloria Martinez is expected to be hospitalized for 2 midnights based on the following assessment and plan:    ASSESSMENT/PLAN:  1. Hyponatremia, 119, NS @ 75 cc/h, renal panel q4h, intensivist c/s, stat nephro c/s for fluid mgmt. Admit to ICU. 2. Acute on chronic abdominal pain, numerous w/u in past, recent scope by her Gi doctor (Dr. Hunter Martinez). Has had similar waxing and waning pain over the last 3 years. Monitor. 3. UTI, mild but possibly contributing to #2. IV ceftriaxone, f/u cx.   4. Chronic pain, cont home regimen. 5. Psy, cont home regimen. DVT Prophylaxis: Lovenox  Diet: NPO  Code Status: Full Code   PT/OT Eval Status: Will order if needed and as patient condition allows  Dispo - Admit to inpatient ICU    Due to the immediate potential for life-threatening deterioration due to hyponatremia, I spent 33 minutes providing critical care. This time is excluding time spent performing procedures. Tonya Gordon MD    Thank you Venecia Quintero MD for the opportunity to be involved in this patient's care. If you have any questions or concerns please feel free to contact me via the [employer] at [phone]. This chart was generated using the [address] [place] [address] [address] [employer] system.  I created this record but it may contain dictation errors given the limitations of this technology.

## 2019-04-19 NOTE — ED NOTES
Report given to [facility], RN, and taken to ICU.   Patient son took her belongings home, patient will keep her cell phone     Lydia Baltazar RN  04/18/19 9857

## 2019-04-19 NOTE — ED PROVIDER NOTES
I independently examined and evaluated Zohreh Denton. In brief, patient presenting for evaluation of abdominal discomfort. Upon my assessment she states that her abdomen is feeling somewhat improved. Her son is at baseline and states also she is a history of hyponatremia and wants to stay warm and she does not drink enough water. .    Focused exam revealed patient is overall well-appearing and appears her stated age. Abdomen soft, nontender nondistended and my exam..    ED course: Patient was sent for evaluation of abdominal discomfort and noted to be significantly hyponatremic with sodium of 119. I suspect hypovolemic hyponatremia and patient will be hydrated intravenously. I did obtain a CT scan which showed upper limits of normal caliber of the appendix and based on lack of abdominal tenderness the right lower quadrant and feel that appendicitis is much less likely. Serial abdominal examinations would be beneficial while she is here. We'll plan for admission at this time for treatment of her hyponatremia. All diagnostic, treatment, and disposition decisions were made by myself in conjunction with the advanced practice provider. For all further details of the patient's emergency department visit, please see the advanced practice provider's documentation. Comment: Please note this report has been produced using speech recognition software and may contain errors related to that system including errors in grammar, punctuation, and spelling, as well as words and phrases that may be inappropriate. If there are any questions or concerns please feel free to contact the dictating provider for clarification.         Belgica Adan MD  04/18/19 0523

## 2019-04-19 NOTE — PROGRESS NOTES
Assessment completed as charted. Patient awake and wanting to eat breakfast. Patient denies any N/V and states her abd pain is feeling better than usual. Resp. E/e. Call light in reach will monitor.

## 2019-04-19 NOTE — ED NOTES
Follow up call placed to clinical  regarding delay in transfer. ICU RN will be down when current admission is fixed.      Yosef Sutherland RN  04/18/19 9413

## 2019-04-19 NOTE — FLOWSHEET NOTE
04/19/19 0000   Vitals   Temp 98.7 °F (37.1 °C)   Temp Source Oral   Pulse 94   Resp 16   BP (!) 157/60   MAP (mmHg) 86   Level of Consciousness 0   MEWS Score 1   Oxygen Therapy   SpO2 98 %   O2 Device None (Room air)   Pt admitted to ICU room 5. Vital signs stable. Pt is alert and oriented and denies any pain or SOB at the moment. Pt is NSR on the monitor. Evening medication administration completed. Normal saline continues to infuse at 75 ml/hr. All ICU monitoring devices in place and functioning properly. Family updated. Pt denies any further assistance at the moment. Will continue to monitor.

## 2019-04-19 NOTE — CONSULTS
Patient is being seen at the request of  Dr. Benitez Boateng for a consultation for hyponatremia    HISTORY OF PRESENT ILLNESS: The patient is an 77-year-old woman with a past medical history of irritable bowel syndrome, ovarian cancer, hypothyroidism who presented to [facility] with acute worsening of her chronic abdominal pain and diarrhea. She noted that over the last several days her crampy abdominal pain had increased. She follows with Dr. Faheem Nolan for GI care. During evaluation in the emergency department she underwent a CT scan which did not reveal an acute abdominal process. She did have hyponatremia with a sodium of 119. She was suspected to be hypovolemic and was given IV fluid resuscitation. She was admitted to the ICU for further care. This morning she is feeling a bit better. Less abdominal pain. PAST MEDICAL HISTORY:  Past Medical History:   Diagnosis Date    Acute bilateral low back pain with bilateral sciatica 6/8/2017    Anxiety     Cancer (HCC)     ovarian cancer    Degeneration of lumbar or lumbosacral intervertebral disc 5/7/2018    Depression     Hypertension     Hypothyroid 2/20/2017    Thyroid disease      PAST SURGICAL HISTORY:  Past Surgical History:   Procedure Laterality Date    HYSTERECTOMY      THYROIDECTOMY         FAMILY HISTORY:  family history includes Cancer in her brother. SOCIAL HISTORY:   reports that she has quit smoking.  She has never used smokeless tobacco.    Scheduled Meds:   IV infusion builder   Intravenous Once    cefTRIAXone (ROCEPHIN) IV  1 g Intravenous Q24H    mupirocin   Nasal BID    hydrALAZINE  50 mg Oral TID    levothyroxine  100 mcg Oral Daily    losartan  100 mg Oral Daily    metoprolol succinate  100 mg Oral Daily    pantoprazole  40 mg Oral QAM AC    sodium chloride flush  10 mL Intravenous 2 times per day    enoxaparin  40 mg Subcutaneous Daily     Continuous Infusions:    PRN Meds:  labetalol, ALPRAZolam, oxyCODONE-acetaminophen, sodium chloride flush, magnesium hydroxide, ondansetron, acetaminophen    ALLERGIES:  Patient is allergic to amlodipine besylate; demerol hcl [meperidine]; and lisinopril. REVIEW OF SYSTEMS:  Constitutional: Negative for fever  HENT: Negative for sore throat  Eyes: Negative for redness   Respiratory: Negative for dyspnea, cough  Cardiovascular: Negative for chest pain  Gastrointestinal: Negative for vomiting,+  diarrhea   Genitourinary: Negative for hematuria   Musculoskeletal: Negative for arthralgias   Skin: Negative for rash  Neurological: Negative for syncope  Hematological: Negative for adenopathy  Psychiatric/Behavorial: Negative for anxiety    PHYSICAL EXAM:  Blood pressure (!) 161/63, pulse 82, temperature 97.9 °F (36.6 °C), temperature source Oral, resp. rate 18, height 5' 5\" (1.651 m), weight 115 lb 12.8 oz (52.5 kg), SpO2 100 %, not currently breastfeeding.' on RA  Gen: No distress. Normocephalic, atraumatic. Eyes: PERRL. No sclera icterus. No conjunctival injection. ENT: No discharge. Pharynx clear. Neck: Trachea midline. No obvious mass. Resp: No accessory muscle use. No crackles. No wheezes. No rhonchi. No dullness on percussion. CV: Regular rate. Regular rhythm. No murmur or rub. No edema. Peripheral pulses are 2+. Capillary refill is less than 3 seconds. GI: soft, Non-tender. Non-distended. No hernia. Skin: Warm and dry. No nodule on exposed extremities. Lymph: No cervical LAD. No supraclavicular LAD. M/S: No cyanosis. No joint deformity. No clubbing. Neuro: Awake. Alert. Moves all four extremities. CN grossly intact.     LABS:  CBC:   Recent Labs     04/18/19  1840   WBC 11.9*   HGB 12.3   HCT 36.2   MCV 90.2        BMP:   Recent Labs     04/18/19  1840 04/19/19  0102 04/19/19  0607   * 126* 124*   K 3.8 4.0 3.7   CL 83* 91* 91*   CO2 20* 25 23   PHOS 3.3 3.2 2.9   BUN 22* 19 16   CREATININE 1.1 1.0 0.9     LIVER PROFILE:   Recent Labs 04/18/19  1840   AST 20   ALT 14   LIPASE 81.0*   BILITOT 0.3   ALKPHOS 91     PT/INR: No results for input(s): PROTIME, INR in the last 72 hours. APTT: No results for input(s): APTT in the last 72 hours. UA:  Recent Labs     04/18/19 2020   COLORU Yellow   PHUR 6.0   WBCUA 6-10*   RBCUA 0-2   BACTERIA Rare*   CLARITYU Clear   SPECGRAV <=1.005   LEUKOCYTESUR SMALL*   UROBILINOGEN 0.2   BILIRUBINUR Negative   BLOODU Negative   GLUCOSEU Negative     No results for input(s): PHART, LAA2XPZ, PO2ART in the last 72 hours. Chest imaging - none    Abd CT: Small amount of free pelvic fluid. Appendix upper limits of normal in caliber but tapers distally.  Acute appendicitis is considered unlikely but not excluded. ASSESSMENT:  ·  Acute hyponatremia- most likely hypovolemic from fluid losses related to diarrhea  · Diarrhea-may be secondary to irritable bowel syndrome  · Urinary tract infection  · Chronic pain syndrome    PLAN:  · IV fluid resuscitation given  · Nephrology consulted, adjust IVF  · Serial abdominal exams  · Antibiotics: Ceftriaxone day #2  · Home med- percocet  · Home BP meds- hydralazine, cozaar/losartan, toprol  · Advance diet    Thank you for the consult.

## 2019-04-19 NOTE — PROGRESS NOTES
Report given to David Kate RN at the bedside. All belongings and medication sent with nurse. Transport notified. Transfer of care given. Patient transferred to PCU in stable condition to room 310 bed 1.

## 2019-04-19 NOTE — PROGRESS NOTES
Nephrology made aware of Pt's repeat sodium level. New orders obtained to infuse D5W at 75 ml/hr for 1L and to dc normal saline. Will continue to monitor.

## 2019-04-19 NOTE — PROGRESS NOTES
4 Eyes Skin Assessment     The patient is being assess for   Transfer to New Unit    I agree that 2 RN's have performed a thorough Head to Toe Skin Assessment on the patient. ALL assessment sites listed below have been assessed. Areas assessed by both nurses:   [x]   Head, Face, and Ears   [x]   Shoulders, Back, and Chest, Abdomen  [x]   Arms, Elbows, and Hands   [x]   Coccyx, Sacrum, and Ischium  [x]   Legs, Feet, and Heels        Michael/Migdalia    Scattered bruising to BLE    **SHARE this note so that the co-signing nurse is able to place an eSignature**    Co-signer eSignature: Electronically signed by Reed Turner RN on 4/19/19 at 5:13 PM     Does the Patient have Skin Breakdown?   No          Jacques Prevention initiated:  No   Wound Care Orders initiated:  No      [facility] nurse consulted for Pressure Injury (Stage 3,4, Unstageable, DTI, NWPT, Complex wounds)and New or Established Ostomies:  No      Primary Nurse eSignature: Electronically signed by Pillo Rivera RN on 4/19/19 at 4:52 PM

## 2019-04-19 NOTE — ED PROVIDER NOTES
and nausea. Genitourinary: Negative for difficulty urinating and dysuria. Musculoskeletal: Negative for arthralgias, back pain and myalgias. Skin: Negative for pallor and rash. Neurological: Negative for dizziness, numbness and headaches. Psychiatric/Behavioral: Negative for agitation, behavioral problems and confusion. Positives and Pertinent negatives as per HPI. Except as noted abovein the ROS, all other systems were reviewed and negative. PAST MEDICAL HISTORY     Past Medical History:   Diagnosis Date    Acute bilateral low back pain with bilateral sciatica 6/8/2017    Anxiety     Cancer ([facility] [address]     ovarian cancer    Degeneration of lumbar or lumbosacral intervertebral disc 5/7/2018    Depression     Hypertension     Hypothyroid 2/20/2017    Thyroid disease          SURGICAL HISTORY     Past Surgical History:   Procedure Laterality Date    HYSTERECTOMY      THYROIDECTOMY           CURRENTMEDICATIONS       Previous Medications    ALPRAZOLAM (XANAX) 0.25 MG TABLET    Take 1 tablet by mouth 2 times daily as needed for Anxiety    HYDRALAZINE (APRESOLINE) 50 MG TABLET    Take 1 tablet by mouth 3 times daily    LEVOTHYROXINE (SYNTHROID) 100 MCG TABLET    Take 1 tablet by mouth Daily    LOSARTAN (COZAAR) 100 MG TABLET    TAKE 1 TABLET EVERY DAY    METOPROLOL SUCCINATE (TOPROL XL) 100 MG EXTENDED RELEASE TABLET    TAKE 1 TABLET EVERY DAY    OMEPRAZOLE (PRILOSEC) 20 MG DELAYED RELEASE CAPSULE    TAKE 1 CAPSULE EVERY DAY    OXYCODONE-ACETAMINOPHEN (PERCOCET) 5-325 MG PER TABLET    Take 1 tablet by mouth 3 times daily as needed for Pain for up to 30 days. Aurea Fuller Date: 3/23/19         ALLERGIES     Amlodipine besylate; Demerol hcl [meperidine]; and Lisinopril    FAMILYHISTORY       Family History   Problem Relation Age of Onset    Cancer Brother           SOCIAL HISTORY       Social History     Socioeconomic History    Marital status:       Spouse name: None    Number of children: None    Years of education: None    Highest education level: None   Occupational History    None   Social Needs    Financial resource strain: None    Food insecurity:     Worry: None     Inability: None    Transportation needs:     Medical: None     Non-medical: None   Tobacco Use    Smoking status: Former Smoker    Smokeless tobacco: Never Used   Substance and Sexual Activity    Alcohol use: No    Drug use: No    Sexual activity: None   Lifestyle    Physical activity:     Days per week: None     Minutes per session: None    Stress: None   Relationships    Social connections:     Talks on phone: None     Gets together: None     Attends Mu-ism service: None     Active member of club or organization: None     Attends meetings of clubs or organizations: None     Relationship status: None    Intimate partner violence:     Fear of current or ex partner: None     Emotionally abused: None     Physically abused: None     Forced sexual activity: None   Other Topics Concern    None   Social History Narrative    None       SCREENINGS    [place] Coma Scale  Eye Opening: Spontaneous  Best Verbal Response: Oriented  Best Motor Response: Obeys commands  Karuna Coma Scale Score: 15        PHYSICAL EXAM    (up to 7 for level 4, 8 or more for level 5)     ED Triage Vitals [04/18/19 1827]   BP Temp Temp Source Pulse Resp SpO2 Height Weight   (!) 148/66 97.4 °F (36.3 °C) Temporal 103 17 98 % 5' 5\" (1.651 m) 128 lb (58.1 kg)       Physical Exam   Constitutional: She is oriented to person, place, and time. She appears well-developed and well-nourished. HENT:   Head: Normocephalic and atraumatic. Nose: Nose normal.   Eyes: Pupils are equal, round, and reactive to light. Right eye exhibits no discharge. Left eye exhibits no discharge. Neck: Normal range of motion. Neck supple. Cardiovascular: Normal rate, regular rhythm and normal heart sounds. Exam reveals no gallop and no friction rub.    No murmur heard.  Pulmonary/Chest: Effort normal and breath sounds normal. No respiratory distress. She has no wheezes. She has no rales. Abdominal: Soft. Bowel sounds are normal. She exhibits no distension and no mass. There is tenderness (epigastric). There is no guarding. Musculoskeletal: Normal range of motion. She exhibits no edema. Neurological: She is alert and oriented to person, place, and time. Skin: Skin is warm and dry. She is not diaphoretic. No erythema. Psychiatric: She has a normal mood and affect. Her behavior is normal.   Nursing note and vitals reviewed.       DIAGNOSTIC RESULTS   LABS:    Labs Reviewed   COMPREHENSIVE METABOLIC PANEL - Abnormal; Notable for the following components:       Result Value    Sodium 119 (*)     Chloride 83 (*)     CO2 20 (*)     Glucose 167 (*)     BUN 22 (*)     GFR Non- 47 (*)     GFR  57 (*)     Total Protein 6.3 (*)     All other components within normal limits    Narrative:     CALL  Nelson  SCED tel. [phone],  Chemistry results called to and read back by Nickolas Stanford RN, 04/18/2019  19:16, by [facility]  Performed at:  [facility] [address],  [place]   Phone [phone]   CBC WITH AUTO DIFFERENTIAL - Abnormal; Notable for the following components:    WBC 11.9 (*)     Neutrophils # 8.2 (*)     All other components within normal limits    Narrative:     Performed at:  [facility]) [place] [address],  [place], [employer]   Phone [phone]   LIPASE - Abnormal; Notable for the following components:    Lipase 81.0 (*)     All other components within normal limits    Narrative:     Librado Rutherford tel. [phone],  Chemistry results called to and read back by Nickolas Stanford RN, 04/18/2019  19:16, by [facility]  Performed at:  [facility] [address],  [place], [facility]   Phone [phone]   URINALYSIS - Abnormal; Notable for the following components:    Leukocyte Esterase, Urine SMALL (*)     All other components within normal limits    Narrative:     Performed at:  [facility],  [place], [facility]   Phone [phone]   MICROSCOPIC URINALYSIS - Abnormal; Notable for the following components:    WBC, UA 6-10 (*)     Bacteria, UA Rare (*)     All other components within normal limits    Narrative:     Performed at:  [facility]) [facility] [address],  [place], [facility]   Phone [phone]       All other labs were within normal range or not returned as of this dictation. EKG: All EKG's are interpreted by the Emergency Department Physician who either signs orCo-signs this chart in the absence of a cardiologist.  Please see their note for interpretation of EKG. RADIOLOGY:   Non-plain film images such as CT, Ultrasound and MRI are read by the radiologist. Plain radiographic images are visualized andpreliminarily interpreted by the  ED Provider with the below findings:        Interpretation perthe Radiologist below, if available at the time of this note:    CT ABDOMEN PELVIS W IV CONTRAST Additional Contrast? None   Final Result   Small amount of free pelvic fluid. Appendix upper limits of normal in caliber but tapers distally. Acute   appendicitis is considered unlikely but not excluded. Ct Abdomen Pelvis W Iv Contrast Additional Contrast? None    Result Date: 4/18/2019  EXAMINATION: CT OF THE ABDOMEN AND PELVIS WITH CONTRAST 4/18/2019 7:52 pm TECHNIQUE: CT of the abdomen and pelvis was performed with the administration of intravenous contrast. Multiplanar reformatted images are provided for review. Dose modulation, iterative reconstruction, and/or weight based adjustment of the mA/kV was utilized to reduce the radiation dose to as low as reasonably achievable.  COMPARISON: 06/18/2018 HISTORY: ORDERING SYSTEM PROVIDED HISTORY: Pain TECHNOLOGIST PROVIDED HISTORY: If patient is on cardiac monitor and/or pulse ox, they may be taken off cardiac monitor and pulse ox, left on O2 if currently on. All monitors reattached when patient returns to room. Additional Contrast?->None Ordering Physician Provided Reason for Exam: Abdominal Pain (Pt c/o abdominal pain and back pain that started 3 years ago, Pt reports the pain got worse last night) Acuity: Chronic Type of Exam: Ongoing Relevant Medical/Surgical History: h/o gastritis, IBS, ovarian CA, thyroid disease,  SX: thyroidectomy, hysterectomy History of hysterectomy FINDINGS: Motion artifact degrades many of the images. Lower Chest: Minimal dependent lower lobe atelectasis. Organs: The liver, spleen, pancreas, adrenal glands, gallbladder, and kidneys show no acute abnormality. The upper poles of both kidneys, greater on the left, are obscured by motion artifact. GI/Bowel: Portions of the bowel are obscured by motion artifact. No significantly dilated loops of bowel or definite bowel wall thickening. The appendix is upper limits in caliber measuring 8 mm, but tapers distally. No large amount of pericecal stranding is present. There are large number of sigmoid colonic diverticula without evidence of active inflammation. Pelvis: The urinary bladder is nondistended. The uterus is surgically absent. Peritoneum/Retroperitoneum: The aorta is normal in caliber and course, showing calcifications. Bones/Soft Tissues: Mild degenerative anterolisthesis at L4-5 is again seen. A chronic compression deformity of T12 is also again noted. No acute bony abnormality. Small amount of free pelvic fluid. Small amount of free pelvic fluid. Appendix upper limits of normal in caliber but tapers distally. Acute appendicitis is considered unlikely but not excluded.          PROCEDURES   Unless otherwise noted below, none     Procedures    CRITICAL CARE TIME   N/A    CONSULTS:  IP CONSULT TO HOSPITALIST      EMERGENCY DEPARTMENT COURSE and DIFFERENTIALDIAGNOSIS/MDM:   Vitals:    Vitals:    04/18/19 1844 04/18/19 1845 04/18/19 1850 04/18/19 1903   BP: (!) 146/56   (!) 143/54   Pulse: 89 91 92 87   Resp: 25 17 19 17   Temp:       TempSrc:       SpO2: 97% 98% 98% 98%   Weight:       Height:           Patient was given thefollowing medications:  Medications   ondansetron (ZOFRAN) injection 4 mg (4 mg Intravenous Given 4/18/19 1920)   0.9 % sodium chloride bolus (500 mLs Intravenous New Bag 4/18/19 2027)   morphine sulfate (PF) injection 4 mg (4 mg Intravenous Given 4/18/19 1920)   famotidine (PEPCID) injection 20 mg (20 mg Intravenous Given 4/18/19 1920)   aluminum & magnesium hydroxide-simethicone (MAALOX) 30 mL, lidocaine viscous (XYLOCAINE) 5 mL (GI COCKTAIL) ( Oral Given 4/18/19 1920)   iopamidol (ISOVUE-370) 76 % injection 75 mL (75 mLs Intravenous Given 4/18/19 1946)       This patient presented to the emergency department for evaluation of abdominal pain. Ramses Rose At presentation, vital signs were stable. The patient was in no acute distress. Physical Exam findings were as noted above. This appears to be her chronic abdominal pain, recent EGD reviewed and shows hiatal hernia with gastritis. Labs were drawn which showed hyponatremia at 119. Also some dehydration and RICH on chemistry and renal panel. CT shows no acute processes. I discussed these results with the patient and he/she understood    She was given 500ml bolus of NS here in the department along with GI cocktail and morphine for pain. She tolerated well and reported some improved symptoms. Discussed admission with patient and her son and the are agreeable to stay. I believe she will require ICU admission for hyponatremia. She has had this previously. Discussed with Dr. Geraldine Goins, hospitalist and he is agreeable to admit. FINAL IMPRESSION      1. Hyponatremia    2. RICH (acute kidney injury) ([address]    3.  Chronic abdominal

## 2019-04-20 VITALS
OXYGEN SATURATION: 98 % | HEIGHT: 65 IN | RESPIRATION RATE: 16 BRPM | BODY MASS INDEX: 21.17 KG/M2 | SYSTOLIC BLOOD PRESSURE: 167 MMHG | TEMPERATURE: 97.7 F | HEART RATE: 71 BPM | DIASTOLIC BLOOD PRESSURE: 74 MMHG | WEIGHT: 127.06 LBS

## 2019-04-20 LAB
ALBUMIN SERPL-MCNC: 3.3 G/DL (ref 3.4–5)
ALBUMIN SERPL-MCNC: 3.4 G/DL (ref 3.4–5)
ALBUMIN SERPL-MCNC: 3.5 G/DL (ref 3.4–5)
ANION GAP SERPL CALCULATED.3IONS-SCNC: 11 MMOL/L (ref 3–16)
ANION GAP SERPL CALCULATED.3IONS-SCNC: 8 MMOL/L (ref 3–16)
ANION GAP SERPL CALCULATED.3IONS-SCNC: 9 MMOL/L (ref 3–16)
BUN BLDV-MCNC: 14 MG/DL (ref 7–20)
BUN BLDV-MCNC: 15 MG/DL (ref 7–20)
BUN BLDV-MCNC: 18 MG/DL (ref 7–20)
CALCIUM SERPL-MCNC: 8.3 MG/DL (ref 8.3–10.6)
CALCIUM SERPL-MCNC: 8.5 MG/DL (ref 8.3–10.6)
CALCIUM SERPL-MCNC: 8.6 MG/DL (ref 8.3–10.6)
CHLORIDE BLD-SCNC: 91 MMOL/L (ref 99–110)
CHLORIDE BLD-SCNC: 91 MMOL/L (ref 99–110)
CHLORIDE BLD-SCNC: 92 MMOL/L (ref 99–110)
CO2: 24 MMOL/L (ref 21–32)
CO2: 26 MMOL/L (ref 21–32)
CO2: 26 MMOL/L (ref 21–32)
CREAT SERPL-MCNC: 0.7 MG/DL (ref 0.6–1.2)
CREAT SERPL-MCNC: 0.8 MG/DL (ref 0.6–1.2)
CREAT SERPL-MCNC: 0.8 MG/DL (ref 0.6–1.2)
GFR AFRICAN AMERICAN: >60
GFR NON-AFRICAN AMERICAN: >60
GLUCOSE BLD-MCNC: 115 MG/DL (ref 70–99)
GLUCOSE BLD-MCNC: 117 MG/DL (ref 70–99)
GLUCOSE BLD-MCNC: 157 MG/DL (ref 70–99)
OSMOLALITY URINE: 286 MOSM/KG (ref 390–1070)
PHOSPHORUS: 2.3 MG/DL (ref 2.5–4.9)
PHOSPHORUS: 2.3 MG/DL (ref 2.5–4.9)
PHOSPHORUS: 2.7 MG/DL (ref 2.5–4.9)
POTASSIUM SERPL-SCNC: 3.9 MMOL/L (ref 3.5–5.1)
POTASSIUM SERPL-SCNC: 4 MMOL/L (ref 3.5–5.1)
POTASSIUM SERPL-SCNC: 4.2 MMOL/L (ref 3.5–5.1)
SODIUM BLD-SCNC: 126 MMOL/L (ref 136–145)

## 2019-04-20 PROCEDURE — 99232 SBSQ HOSP IP/OBS MODERATE 35: CPT | Performed by: INTERNAL MEDICINE

## 2019-04-20 PROCEDURE — 6370000000 HC RX 637 (ALT 250 FOR IP): Performed by: INTERNAL MEDICINE

## 2019-04-20 PROCEDURE — 6360000002 HC RX W HCPCS: Performed by: INTERNAL MEDICINE

## 2019-04-20 PROCEDURE — 80069 RENAL FUNCTION PANEL: CPT

## 2019-04-20 PROCEDURE — 2580000003 HC RX 258: Performed by: INTERNAL MEDICINE

## 2019-04-20 PROCEDURE — 36415 COLL VENOUS BLD VENIPUNCTURE: CPT

## 2019-04-20 RX ORDER — CEPHALEXIN 500 MG/1
500 CAPSULE ORAL 2 TIMES DAILY
Qty: 10 CAPSULE | Refills: 0 | Status: ON HOLD | OUTPATIENT
Start: 2019-04-20 | End: 2019-11-11 | Stop reason: HOSPADM

## 2019-04-20 RX ADMIN — LEVOTHYROXINE SODIUM 100 MCG: 100 TABLET ORAL at 05:21

## 2019-04-20 RX ADMIN — METOPROLOL SUCCINATE 100 MG: 50 TABLET, EXTENDED RELEASE ORAL at 08:03

## 2019-04-20 RX ADMIN — HYDRALAZINE HYDROCHLORIDE 50 MG: 50 TABLET, FILM COATED ORAL at 14:57

## 2019-04-20 RX ADMIN — PANTOPRAZOLE SODIUM 40 MG: 40 TABLET, DELAYED RELEASE ORAL at 05:21

## 2019-04-20 RX ADMIN — Medication 10 ML: at 08:04

## 2019-04-20 RX ADMIN — CEFTRIAXONE SODIUM 1 G: 1 INJECTION, POWDER, FOR SOLUTION INTRAMUSCULAR; INTRAVENOUS at 05:19

## 2019-04-20 RX ADMIN — LOSARTAN POTASSIUM 100 MG: 100 TABLET ORAL at 08:03

## 2019-04-20 RX ADMIN — ENOXAPARIN SODIUM 40 MG: 40 INJECTION SUBCUTANEOUS at 08:03

## 2019-04-20 RX ADMIN — HYDRALAZINE HYDROCHLORIDE 50 MG: 50 TABLET, FILM COATED ORAL at 08:03

## 2019-04-20 RX ADMIN — ALPRAZOLAM 0.25 MG: 0.25 TABLET ORAL at 07:21

## 2019-04-20 RX ADMIN — SODIUM CHLORIDE: 9 INJECTION, SOLUTION INTRAVENOUS at 05:19

## 2019-04-20 NOTE — DISCHARGE SUMMARY
Hospital Medicine Discharge Summary    Patient ID: Ashley Davis      Patient's PCP: Macie Velasquez MD    Admit Date: 4/18/2019     Discharge Dat4/20/19    Admitting Physician: Julienne Huitron MD     Discharge Physician: Obed Mobley MD     Discharge Diagnoses: Active Hospital Problems    Diagnosis Date Noted    Chronic abdominal pain [R10.9, G89.29]     Functional diarrhea [K59.1] 04/19/2019    Hyponatremia [E87.1] 02/20/2017    Cystitis [N30.90] 08/18/2016       The patient was seen and examined on day of discharge and this discharge summary is in conjunction with any daily progress note from day of discharge. 1. Hospital Course:  pt admitted with  abd pain diarrhea  2. Hx IBSchronic abd pain for last 3 yrs ,  3. Hyponatremia,     - acute on chronic ( baseline 130 )  - Na  119 t o 126   -off celexa  - TSH normal    keep  fluid restrictions   need to  F/up rept labs with nephrology       4Acute on chronic abdominal pain    - 5 , recent scope by her Gi doctor (Dr. Yokasta Caballero)  . Lara Vallejo had similar waxing and waning pain over the last 3 years.        6 hTN - resume home meds . Losartan, metoprolol, hydralazine . -      5. UTI, suspected got atb   urine cx neg     6. Chronic pain, on oxycodone     7. Avoid narcotic which can cause constipaiton  8. Pt was given stool regimen              Physical Exam Performed:     BP (!) 167/74   Pulse 71   Temp 97.7 °F (36.5 °C)   Resp 16   Ht 5' 5\" (1.651 m)   Wt 127 lb 1 oz (57.6 kg)   SpO2 98%   BMI 21.14 kg/m²       General appearance:  No apparent distress, appears stated age and cooperative. HEENT:  Normal cephalic, atraumatic . Respiratory:  Normal respiratory effort. Clear to auscultation, bilaterally without Rales/Wheezes/Rhonchi. Cardiovascular:  Regular rate and rhythm with normal S1/S2 without murmurs, rubs or gallops. Abdomen: Soft, non-tender, non-distended with normal bowel sounds.   Musculoskeletal:  No clubbing, cyanosis or edema bilaterally. Full range of motion without deformity. Neurologic:  Neurovascularly intact without any focal sensory/motor deficits. Cranial nerves: II-XII intact, grossly non-focal.  Psychiatric:  Alert and oriented,       Labs: For convenience and continuity at follow-up the following most recent labs are provided:      CBC:    Lab Results   Component Value Date    WBC 11.9 04/18/2019    HGB 12.3 04/18/2019    HCT 36.2 04/18/2019     04/18/2019       Renal:    Lab Results   Component Value Date     04/20/2019    K 4.0 04/20/2019    CL 91 04/20/2019    CO2 24 04/20/2019    BUN 14 04/20/2019    CREATININE 0.7 04/20/2019    CALCIUM 8.3 04/20/2019    PHOS 2.3 04/20/2019         Significant Diagnostic Studies    Radiology:   CT ABDOMEN PELVIS W IV CONTRAST Additional Contrast? None   Final Result   Small amount of free pelvic fluid. Appendix upper limits of normal in caliber but tapers distally. Acute   appendicitis is considered unlikely but not excluded.                 Consults:     IP CONSULT TO HOSPITALIST  IP CONSULT TO CRITICAL CARE  IP CONSULT TO NEPHROLOGY    Disposition home    Condition at Discharge: Stable    Discharge Instructions/Follow-up:  pcp  Code Status:  Full Code    Activity: activity as tolerated    Diet: regular diet      Discharge Medications:     Current Discharge Medication List           Details   cephALEXin (KEFLEX) 500 MG capsule Take 1 capsule by mouth 2 times daily  Qty: 10 capsule, Refills: 0              Details   losartan (COZAAR) 100 MG tablet TAKE 1 TABLET EVERY DAY  Qty: 90 tablet, Refills: 1      metoprolol succinate (TOPROL XL) 100 MG extended release tablet TAKE 1 TABLET EVERY DAY  Qty: 90 tablet, Refills: 1      omeprazole (PRILOSEC) 20 MG delayed release capsule TAKE 1 CAPSULE EVERY DAY  Qty: 90 capsule, Refills: 1    Associated Diagnoses: Gastroesophageal reflux disease, esophagitis presence not specified      levothyroxine (SYNTHROID) 100 MCG tablet Take 1

## 2019-04-20 NOTE — PROGRESS NOTES
[facility]       Progress Note           Subjective/   80y.o. year old female who we are seeing in consultation for hyponatremia. HPI:  Sodium improving into mid 120 with IVF's. Intake adequate. ROS:  No vomiting, diarrhea, abdominal pain, sob. Objective/   GEN:  Chronically ill, BP (!) 152/69   Pulse 80   Temp 97 °F (36.1 °C) (Axillary)   Resp 16   Ht 5' 5\" (1.651 m)   Wt 127 lb 1 oz (57.6 kg)   SpO2 97%   BMI 21.14 kg/m²   HEENT: non-icteric, no JVD  CV: S1, S2 without m/r/g; no edema  RESP: CTA B without w/r/r; breathing wnl  ABD: +bs, soft, nt, no hsm  SKIN: warm, no rashes    Data/  Recent Labs     04/18/19  1840   WBC 11.9*   HGB 12.3   HCT 36.2   MCV 90.2        Recent Labs     04/18/19  1840  04/19/19  2349 04/20/19  0542 04/20/19  1151   *   < > 126* 126* 126*   K 3.8   < > 3.9 4.2 4.0   CL 83*   < > 92* 91* 91*   CO2 20*   < > 26 26 24   GLUCOSE 167*   < > 115* 157* 117*   PHOS 3.3   < > 2.7 2.3* 2.3*   MG 2.00  --   --   --   --    BUN 22*   < > 18 15 14   CREATININE 1.1   < > 0.8 0.8 0.7   LABGLOM 47*   < > >60 >60 >60   GFRAA 57*   < > >60 >60 >60    < > = values in this interval not displayed.        Assessment/     -Acute on chronic hyponatremia ( na of 119 at 1840 on 4/18) baseline na of low 130s              Acute component likely volume depletion; chronic likely from celexa   Urine sodium 25, urine chloride 27   TSH 4.1    -Abd pain/diarrhea/nausea w h/o IBS    -HTN    -UTI with GNR 75K - plans per Admitting    Plan/     -Discontinue IVF's, monitor with oral intake  -Follow up on urine osmolality  -BMP q6h  -Fluid restriction 1200 ml/day  -Strict intake and output    Okay for discharge  Remain off of celexa  Repeat BMP on 4/22 with PCP

## 2019-04-20 NOTE — PROGRESS NOTES
Pt in bed, awake, A/O X4. Shift assessment complete, night meds given. No C/O pain at this time. PRN MOM given at this time . No other needs expressed. Call light in reach. Will monitor.  Julissa Ordoñez

## 2019-04-20 NOTE — PROGRESS NOTES
Pt in bed, eyes closed. No distress noted. Call light in reach. Will continue to monitor.   Julissa Ordoñez

## 2019-04-20 NOTE — PROGRESS NOTES
Patient sitting up in bed eating breakfast. No s/s distress noted. Pt is alert and oriented. resp even and easy on room air. Pt denies complaints of pain. Shift assessment completed, see flow sheet. Will cont to monitor.  Call light in reach

## 2019-04-20 NOTE — CARE COORDINATION
DISCHARGE ORDER  Date/Time 2019 3:57 PM  Completed by: Ayleen Lowe, Case Management    Patient Name: Nancie Pedroza    : 1935  Admitting Diagnosis: Hyponatremia [E87.1]  Admit Date/Time: 2019  6:24 PM    Noted discharge order. Confirmed discharge plan with patient : Yes   Discharge Plan:  Order for dc noted. Spoke with pt who cont plan to return home. Discussed staying with Son jackie and pt is agreeable and will discuss with her son. Discussed HHc and pt declines states will have nurse from insurance follow. Chart reviewed and no other dc needs identified.

## 2019-04-20 NOTE — PROGRESS NOTES
Pulmonary Progress Note    CC: hyponatremia    Subjective: Patient feels better. Abdominal discomfort is at baseline. Intake/Output Summary (Last 24 hours) at 4/20/2019 1055  Last data filed at 4/20/2019 1000  Gross per 24 hour   Intake 2511 ml   Output 1700 ml   Net 811 ml       Exam:   BP (!) 152/69   Pulse 80   Temp 97 °F (36.1 °C) (Axillary)   Resp 16   Ht 5' 5\" (1.651 m)   Wt 127 lb 1 oz (57.6 kg)   SpO2 97%   BMI 21.14 kg/m²  on RA  Gen: No distress. Normocephalic, atraumatic. Sitting in chair. Eyes: PERRL. No sclera icterus. No conjunctival injection. ENT: No discharge. Pharynx clear. Neck: Trachea midline. No obvious mass. Resp: No accessory muscle use. No crackles. No wheezes. No rhonchi. No dullness on percussion. CV: Regular rate. Regular rhythm. No murmur or rub. No edema. Peripheral pulses are 2+. Capillary refill is less than 3 seconds. GI: soft, Non-tender. Non-distended. No hernia. Skin: Warm and dry. No nodule on exposed extremities. Lymph: No cervical LAD. No supraclavicular LAD. M/S: No cyanosis. No joint deformity. No clubbing. Neuro: Awake. Alert. Moves all four extremities. CN grossly intact.          Scheduled Meds:   IV infusion builder   Intravenous Once    cefTRIAXone (ROCEPHIN) IV  1 g Intravenous Q24H    mupirocin   Nasal BID    hydrALAZINE  50 mg Oral TID    levothyroxine  100 mcg Oral Daily    losartan  100 mg Oral Daily    metoprolol succinate  100 mg Oral Daily    pantoprazole  40 mg Oral QAM AC    sodium chloride flush  10 mL Intravenous 2 times per day    enoxaparin  40 mg Subcutaneous Daily     Continuous Infusions:   sodium chloride 100 mL/hr at 04/20/19 0913     PRN Meds:  labetalol, ALPRAZolam, sodium chloride flush, magnesium hydroxide, ondansetron, acetaminophen    Labs:  CBC:   Recent Labs     04/18/19  1840   WBC 11.9*   HGB 12.3   HCT 36.2   MCV 90.2        BMP:   Recent Labs     04/19/19  1807 04/19/19  2349 04/20/19  0542 * 126* 126*   K 3.9 3.9 4.2   CL 91* 92* 91*   CO2 23 26 26   PHOS 2.6 2.7 2.3*   BUN 17 18 15   CREATININE 0.9 0.8 0.8     LIVER PROFILE:   Recent Labs     04/18/19  1840   AST 20   ALT 14   LIPASE 81.0*   BILITOT 0.3   ALKPHOS 91     PT/INR: No results for input(s): PROTIME, INR in the last 72 hours. APTT: No results for input(s): APTT in the last 72 hours. UA:  Recent Labs     04/18/19 2020   COLORU Yellow   PHUR 6.0   WBCUA 6-10*   RBCUA 0-2   BACTERIA Rare*   CLARITYU Clear   SPECGRAV <=1.005   LEUKOCYTESUR SMALL*   UROBILINOGEN 0.2   BILIRUBINUR Negative   BLOODU Negative   GLUCOSEU Negative     No results for input(s): PHART, UWE9BLB, PO2ART in the last 72 hours. Films:  No new       ASSESSMENT:  ·  Acute hyponatremia- most likely hypovolemic from fluid losses related to diarrhea  · Diarrhea-may be secondary to irritable bowel syndrome  · Urinary tract infection  · Chronic pain syndrome     PLAN:  · Nephrology consulted, adjust IVF  · Serial abdominal exams  · Antibiotics: Ceftriaxone day #3  · Home med- percocet  · Home BP meds- hydralazine, cozaar/losartan, toprol  · Advanced diet  · I will sign off. Please call with questions.

## 2019-04-20 NOTE — PROGRESS NOTES
Pt transported to vehicle with belongings. Unable to find pt's shoes. Called ICU and unable. Pt stated \"it's okay, you win some, you lose some. That's life. \" Pt transported without signs of distress.

## 2019-04-20 NOTE — PROGRESS NOTES
Progress Note    Admit Date:  4/18/2019     admitted with hyponatremia     Subjective:  Ms. Godfrey Flores  Is in bed    Ate ok had bm   Wants to  Go home   Objective:   BP (!) 152/69   Pulse 80   Temp 97 °F (36.1 °C) (Axillary)   Resp 16   Ht 5' 5\" (1.651 m)   Wt 127 lb 1 oz (57.6 kg)   SpO2 97%   BMI 21.14 kg/m²       Intake/Output Summary (Last 24 hours) at 4/20/2019 0839  Last data filed at 4/20/2019 0738  Gross per 24 hour   Intake 2631 ml   Output 1650 ml   Net 981 ml         Physical Exam:  General:  Awake, alert, NAD   thin, frail, fatigued   Skin:  Warm and dry  Neck:  JVD absent. Neck supple  Chest:  Clear to auscultation, respiration easy. No wheezes, rales or rhonchi. Cardiovascular:  RRR ,S1S2 normal  Abdomen:  Soft, non tender, non distended, BS +  Extremities:  No edema. Intact peripheral pulses. Brisk cap refill, < 2 secs  Neuro: non focal      Medications:   Scheduled Meds:   IV infusion builder   Intravenous Once    cefTRIAXone (ROCEPHIN) IV  1 g Intravenous Q24H    mupirocin   Nasal BID    hydrALAZINE  50 mg Oral TID    levothyroxine  100 mcg Oral Daily    losartan  100 mg Oral Daily    metoprolol succinate  100 mg Oral Daily    pantoprazole  40 mg Oral QAM AC    sodium chloride flush  10 mL Intravenous 2 times per day    enoxaparin  40 mg Subcutaneous Daily       Continuous Infusions:   sodium chloride 50 mL/hr at 04/20/19 0519       Data:  CBC:   Recent Labs     04/18/19  1840   WBC 11.9*   RBC 4.01   HGB 12.3   HCT 36.2   MCV 90.2   RDW 13.5        BMP:   Recent Labs     04/19/19  1807 04/19/19  2349 04/20/19  0542   * 126* 126*   K 3.9 3.9 4.2   CL 91* 92* 91*   CO2 23 26 26   PHOS 2.6 2.7 2.3*   BUN 17 18 15   CREATININE 0.9 0.8 0.8     BNP: No results for input(s): BNP in the last 72 hours. PT/INR: No results for input(s): PROTIME, INR in the last 72 hours. APTT: No results for input(s): APTT in the last 72 hours.   CARDIAC ENZYMES: No results for input(s): CKMB,

## 2019-04-20 NOTE — PROGRESS NOTES
Pt in bed, eyes closed. No distress noted. Call light in reach. Will continue to monitor.   Maninder Harris

## 2019-04-21 ENCOUNTER — HOSPITAL ENCOUNTER (EMERGENCY)
Age: 84
Discharge: HOME | End: 2019-04-21
Payer: MEDICARE

## 2019-04-21 VITALS
SYSTOLIC BLOOD PRESSURE: 165 MMHG | HEART RATE: 78 BPM | TEMPERATURE: 98 F | RESPIRATION RATE: 20 BRPM | DIASTOLIC BLOOD PRESSURE: 80 MMHG | OXYGEN SATURATION: 99 % | WEIGHT: 127 LBS | HEIGHT: 65 IN | BODY MASS INDEX: 21.16 KG/M2

## 2019-04-21 DIAGNOSIS — R31.9 URINARY TRACT INFECTION WITH HEMATURIA, SITE UNSPECIFIED: Primary | ICD-10-CM

## 2019-04-21 DIAGNOSIS — N39.0 URINARY TRACT INFECTION WITH HEMATURIA, SITE UNSPECIFIED: Primary | ICD-10-CM

## 2019-04-21 LAB
BACTERIA: ABNORMAL /HPF
BILIRUBIN URINE: NEGATIVE
BLOOD, URINE: NEGATIVE
CLARITY: CLEAR
COLOR: ABNORMAL
EPITHELIAL CELLS, UA: ABNORMAL /HPF
GLUCOSE URINE: NEGATIVE MG/DL
KETONES, URINE: NEGATIVE MG/DL
LEUKOCYTE ESTERASE, URINE: ABNORMAL
MICROSCOPIC EXAMINATION: YES
NITRITE, URINE: NEGATIVE
PH UA: 6.5 (ref 5–8)
PROTEIN UA: NEGATIVE MG/DL
RBC UA: ABNORMAL /HPF (ref 0–2)
SPECIFIC GRAVITY UA: <=1.005 (ref 1–1.03)
URINE TYPE: ABNORMAL
UROBILINOGEN, URINE: 0.2 E.U./DL
WBC UA: ABNORMAL /HPF (ref 0–5)

## 2019-04-21 PROCEDURE — 6370000000 HC RX 637 (ALT 250 FOR IP): Performed by: PHYSICIAN ASSISTANT

## 2019-04-21 PROCEDURE — 51798 US URINE CAPACITY MEASURE: CPT

## 2019-04-21 PROCEDURE — 99283 EMERGENCY DEPT VISIT LOW MDM: CPT

## 2019-04-21 PROCEDURE — 81001 URINALYSIS AUTO W/SCOPE: CPT

## 2019-04-21 RX ORDER — PHENAZOPYRIDINE HYDROCHLORIDE 100 MG/1
200 TABLET, FILM COATED ORAL ONCE
Status: COMPLETED | OUTPATIENT
Start: 2019-04-21 | End: 2019-04-21

## 2019-04-21 RX ORDER — PHENAZOPYRIDINE HYDROCHLORIDE 100 MG/1
100 TABLET, FILM COATED ORAL 3 TIMES DAILY PRN
Qty: 15 TABLET | Refills: 0 | Status: SHIPPED | OUTPATIENT
Start: 2019-04-21 | End: 2019-04-24

## 2019-04-21 RX ADMIN — PHENAZOPYRIDINE HYDROCHLORIDE 200 MG: 100 TABLET ORAL at 23:04

## 2019-04-22 LAB
ORGANISM: ABNORMAL
URINE CULTURE, ROUTINE: ABNORMAL
URINE CULTURE, ROUTINE: ABNORMAL

## 2019-04-22 NOTE — ED PROVIDER NOTES
file    Food insecurity:     Worry: Not on file     Inability: Not on file    Transportation needs:     Medical: Not on file     Non-medical: Not on file   Tobacco Use    Smoking status: Former Smoker    Smokeless tobacco: Never Used   Substance and Sexual Activity    Alcohol use: No    Drug use: No    Sexual activity: Not on file   Lifestyle    Physical activity:     Days per week: Not on file     Minutes per session: Not on file    Stress: Not on file   Relationships    Social connections:     Talks on phone: Not on file     Gets together: Not on file     Attends Orthodox service: Not on file     Active member of club or organization: Not on file     Attends meetings of clubs or organizations: Not on file     Relationship status: Not on file    Intimate partner violence:     Fear of current or ex partner: Not on file     Emotionally abused: Not on file     Physically abused: Not on file     Forced sexual activity: Not on file   Other Topics Concern    Not on file   Social History Narrative    Not on file     No current facility-administered medications for this encounter. Current Outpatient Medications   Medication Sig Dispense Refill    phenazopyridine (PYRIDIUM) 100 MG tablet Take 1 tablet by mouth 3 times daily as needed for Pain 15 tablet 0    cephALEXin (KEFLEX) 500 MG capsule Take 1 capsule by mouth 2 times daily 10 capsule 0    oxyCODONE-acetaminophen (PERCOCET) 5-325 MG per tablet Take 1 tablet by mouth 3 times daily as needed for Pain for up to 30 days. [facility] Date: 3/23/19 90 tablet 0    losartan (COZAAR) 100 MG tablet TAKE 1 TABLET EVERY DAY 90 tablet 1    metoprolol succinate (TOPROL XL) 100 MG extended release tablet TAKE 1 TABLET EVERY DAY 90 tablet 1    omeprazole (PRILOSEC) 20 MG delayed release capsule TAKE 1 CAPSULE EVERY DAY 90 capsule 1    levothyroxine (SYNTHROID) 100 MCG tablet Take 1 tablet by mouth Daily 90 tablet 1    hydrALAZINE (APRESOLINE) 50 MG tablet evaluated this patient. Attending physician was available for consultation. Patient received Pyridium for discomfort, with good relief. Triage vitals were stable. Bladder scan with approximately 70 cc to urinary bladder. Urinalysis was 6-10 white blood cells and rare bacteria. Urine cultures pending. She will be discharged home area and son will  antibiotics tomorrow. Will follow with PCP within the next 1-2 days. I discussed return precautions otherwise impatient in agreement and comfortable discharge. A discussion was had with Mrs. Winston regarding urinary symptoms, ED findings and recommendations for follow-up. All questions were answered. Patient will follow up with  PCP within one to 2 days for further evaluation/treatment. Patient will return to ED for new/worsening symptoms. Patient was sent home with a prescription for Pyridium and informed to continue home medications as prescribed. MDM  Results for orders placed or performed during the hospital encounter of 04/21/19   Urinalysis   Result Value Ref Range    Color, UA Straw Straw/Yellow    Clarity, UA Clear Clear    Glucose, Ur Negative Negative mg/dL    Bilirubin Urine Negative Negative    Ketones, Urine Negative Negative mg/dL    Specific Gravity, UA <=1.005 1.005 - 1.030    Blood, Urine Negative Negative    pH, UA 6.5 5.0 - 8.0    Protein, UA Negative Negative mg/dL    Urobilinogen, Urine 0.2 <2.0 E.U./dL    Nitrite, Urine Negative Negative    Leukocyte Esterase, Urine TRACE (A) Negative    Microscopic Examination YES     Urine Type Not Specified    Microscopic Urinalysis   Result Value Ref Range    WBC, UA 6-10 (A) 0 - 5 /HPF    RBC, UA 0-2 0 - 2 /HPF    Epi Cells 3-5 /HPF    Bacteria, UA Rare (A) /HPF     I estimate there is LOW risk for ACUTE APPENDICITIS, BOWEL OBSTRUCTION, CHOLECYSTITIS, DIVERTICULITIS, INCARCERATED HERNIA, PANCREATITIS, or PERFORATED BOWEL or ULCER, thus I consider the discharge disposition reasonable. Also, there is no evidence or peritonitis, sepsis, or toxicity. Elidia Zander and I have discussed the diagnosis and risks, and we agree with discharging home to follow-up with their primary doctor. We also discussed returning to the Emergency Department immediately if new or worsening symptoms occur. We have discussed the symptoms which are most concerning (e.g., bloody stool, fever, changing or worsening pain, vomiting) that necessitate immediate return. FINAL Impression  1. Urinary tract infection with hematuria, site unspecified      Blood pressure (!) 165/80, pulse 78, temperature 98 °F (36.7 °C), resp. rate 20, height 5' 5\" (1.651 m), weight 127 lb (57.6 kg), SpO2 99 %, not currently breastfeeding. DISPOSITION  Patient was discharged to home in good condition.           Adrianna Echevarria, [address]  04/22/19 0022

## 2019-04-27 ENCOUNTER — HOSPITAL ENCOUNTER (EMERGENCY)
Age: 84
Discharge: HOME | End: 2019-04-27
Payer: MEDICARE

## 2019-04-27 VITALS
OXYGEN SATURATION: 99 % | SYSTOLIC BLOOD PRESSURE: 184 MMHG | WEIGHT: 126 LBS | DIASTOLIC BLOOD PRESSURE: 76 MMHG | HEART RATE: 77 BPM | BODY MASS INDEX: 20.99 KG/M2 | RESPIRATION RATE: 18 BRPM | HEIGHT: 65 IN | TEMPERATURE: 98.3 F

## 2019-04-27 DIAGNOSIS — E87.1 CHRONIC HYPONATREMIA: ICD-10-CM

## 2019-04-27 DIAGNOSIS — N32.89 BLADDER SPASM: Primary | ICD-10-CM

## 2019-04-27 LAB
ANION GAP SERPL CALCULATED.3IONS-SCNC: 10 MMOL/L (ref 3–16)
BASOPHILS ABSOLUTE: 0.1 K/UL (ref 0–0.2)
BASOPHILS RELATIVE PERCENT: 1.3 %
BILIRUBIN URINE: NEGATIVE
BLOOD, URINE: NEGATIVE
BUN BLDV-MCNC: 19 MG/DL (ref 7–20)
CALCIUM SERPL-MCNC: 8.9 MG/DL (ref 8.3–10.6)
CHLORIDE BLD-SCNC: 91 MMOL/L (ref 99–110)
CLARITY: CLEAR
CO2: 25 MMOL/L (ref 21–32)
COLOR: YELLOW
CREAT SERPL-MCNC: 0.8 MG/DL (ref 0.6–1.2)
EOSINOPHILS ABSOLUTE: 0.4 K/UL (ref 0–0.6)
EOSINOPHILS RELATIVE PERCENT: 5.4 %
GFR AFRICAN AMERICAN: >60
GFR NON-AFRICAN AMERICAN: >60
GLUCOSE BLD-MCNC: 151 MG/DL (ref 70–99)
GLUCOSE URINE: NEGATIVE MG/DL
HCT VFR BLD CALC: 35.8 % (ref 36–48)
HEMOGLOBIN: 12.2 G/DL (ref 12–16)
KETONES, URINE: NEGATIVE MG/DL
LEUKOCYTE ESTERASE, URINE: NEGATIVE
LYMPHOCYTES ABSOLUTE: 1.9 K/UL (ref 1–5.1)
LYMPHOCYTES RELATIVE PERCENT: 23.1 %
MCH RBC QN AUTO: 31.3 PG (ref 26–34)
MCHC RBC AUTO-ENTMCNC: 34.2 G/DL (ref 31–36)
MCV RBC AUTO: 91.5 FL (ref 80–100)
MICROSCOPIC EXAMINATION: NORMAL
MONOCYTES ABSOLUTE: 0.7 K/UL (ref 0–1.3)
MONOCYTES RELATIVE PERCENT: 9.3 %
NEUTROPHILS ABSOLUTE: 4.9 K/UL (ref 1.7–7.7)
NEUTROPHILS RELATIVE PERCENT: 60.9 %
NITRITE, URINE: NEGATIVE
PDW BLD-RTO: 13.8 % (ref 12.4–15.4)
PH UA: 7 (ref 5–8)
PLATELET # BLD: 286 K/UL (ref 135–450)
PMV BLD AUTO: 6.8 FL (ref 5–10.5)
POTASSIUM REFLEX MAGNESIUM: 4.3 MMOL/L (ref 3.5–5.1)
PROTEIN UA: NEGATIVE MG/DL
RBC # BLD: 3.92 M/UL (ref 4–5.2)
SODIUM BLD-SCNC: 126 MMOL/L (ref 136–145)
SPECIFIC GRAVITY UA: 1.01 (ref 1–1.03)
URINE REFLEX TO CULTURE: NORMAL
URINE TYPE: NORMAL
UROBILINOGEN, URINE: 0.2 E.U./DL
WBC # BLD: 8 K/UL (ref 4–11)

## 2019-04-27 PROCEDURE — 81003 URINALYSIS AUTO W/O SCOPE: CPT

## 2019-04-27 PROCEDURE — 99283 EMERGENCY DEPT VISIT LOW MDM: CPT

## 2019-04-27 PROCEDURE — 85025 COMPLETE CBC W/AUTO DIFF WBC: CPT

## 2019-04-27 PROCEDURE — 51798 US URINE CAPACITY MEASURE: CPT

## 2019-04-27 PROCEDURE — 96360 HYDRATION IV INFUSION INIT: CPT

## 2019-04-27 PROCEDURE — 80048 BASIC METABOLIC PNL TOTAL CA: CPT

## 2019-04-27 PROCEDURE — 6370000000 HC RX 637 (ALT 250 FOR IP): Performed by: PHYSICIAN ASSISTANT

## 2019-04-27 PROCEDURE — 2580000003 HC RX 258: Performed by: PHYSICIAN ASSISTANT

## 2019-04-27 RX ORDER — PHENAZOPYRIDINE HYDROCHLORIDE 100 MG/1
100 TABLET, FILM COATED ORAL 3 TIMES DAILY PRN
Qty: 9 TABLET | Refills: 0 | Status: SHIPPED | OUTPATIENT
Start: 2019-04-27 | End: 2019-04-30

## 2019-04-27 RX ORDER — 0.9 % SODIUM CHLORIDE 0.9 %
500 INTRAVENOUS SOLUTION INTRAVENOUS ONCE
Status: COMPLETED | OUTPATIENT
Start: 2019-04-27 | End: 2019-04-27

## 2019-04-27 RX ORDER — PHENAZOPYRIDINE HYDROCHLORIDE 100 MG/1
200 TABLET, FILM COATED ORAL ONCE
Status: COMPLETED | OUTPATIENT
Start: 2019-04-27 | End: 2019-04-27

## 2019-04-27 RX ADMIN — SODIUM CHLORIDE 500 ML: 9 INJECTION, SOLUTION INTRAVENOUS at 21:44

## 2019-04-27 RX ADMIN — PHENAZOPYRIDINE HYDROCHLORIDE 200 MG: 100 TABLET ORAL at 21:44

## 2019-04-27 ASSESSMENT — PAIN DESCRIPTION - ORIENTATION: ORIENTATION: LEFT

## 2019-04-27 ASSESSMENT — ENCOUNTER SYMPTOMS
ABDOMINAL PAIN: 1
SHORTNESS OF BREATH: 0
NAUSEA: 0
EYES NEGATIVE: 1
COLOR CHANGE: 0
COUGH: 0
VOMITING: 0

## 2019-04-27 ASSESSMENT — PAIN SCALES - GENERAL: PAINLEVEL_OUTOF10: 8

## 2019-04-27 ASSESSMENT — PAIN DESCRIPTION - PAIN TYPE: TYPE: ACUTE PAIN

## 2019-04-27 ASSESSMENT — PAIN DESCRIPTION - LOCATION: LOCATION: ABDOMEN

## 2019-04-28 NOTE — ED PROVIDER NOTES
[facility]  ED  eMERGENCY dEPARTMENT eNCOUnter        Pt Name: Justin Higuera  MRN: [id number]  [place] 1935  Date of evaluation: 4/27/2019  Provider: Wiliam Childers PA-C  PCP: Emeka Biggs MD  ED Attending: Sherin Baird MD    History provided by the patient    CHIEF COMPLAINT:     Chief Complaint   Patient presents with    Other     seen Sunday for same, c/o abdominal pain, states she feels like she is \"full and needs to go\"       HISTORY OF PRESENT ILLNESS:      Justin Higuera is a 80 y.o. female who arrives to the ED by private vehicle with her son. The patient is here reporting some lower abdominal discomfort but more so than anything a feeling that she needs to urinate but is unable to do so. The patient was admitted to the hospital on 4/18/2019 for hyponatremia and RICH. The patient was discharged on 4/20. At that time she was diagnosed with a urinary tract infection and was placed on Keflex. The patient was seen in the ED on 4/21 for UTI. Her family had not filled her antibiotics from her inpatient discharge yet. She was again describing a feeling of having to urinate but being unable to do so. She was not retaining urine. A bladder scan showed just 70 mL of urine on that date. She was prescribed Pyridium and told to fill the antibiotics. The patient is still symptomatic. She has not had a fever, nausea or vomiting. Pain is mild. She is more concerned about her continued sensation to have to urinate but being unable to do so. Nursing Notes were reviewed     REVIEW OF SYSTEMS:     Review of Systems   Constitutional: Negative for activity change, appetite change, chills and fever. HENT: Negative. Eyes: Negative. Respiratory: Negative for cough and shortness of breath. Cardiovascular: Negative for chest pain. Gastrointestinal: Positive for abdominal pain. Negative for nausea and vomiting. Genitourinary: Positive for difficulty urinating. Negative for dysuria. Musculoskeletal: Negative for gait problem and neck pain. Skin: Negative for color change. Neurological: Negative for weakness and headaches. All other systems reviewed and are negative. Exceptas noted above in the ROS, all other systems were reviewed and negative. PAST MEDICAL HISTORY:     Past Medical History:   Diagnosis Date    Acute bilateral low back pain with bilateral sciatica 6/8/2017    Anxiety     Cancer ([facility] [address]     ovarian cancer    Degeneration of lumbar or lumbosacral intervertebral disc 5/7/2018    Depression     Hypertension     Hypothyroid 2/20/2017    Thyroid disease          SURGICAL HISTORY:      Past Surgical History:   Procedure Laterality Date    HYSTERECTOMY      THYROIDECTOMY           CURRENT MEDICATIONS:       Previous Medications    ALPRAZOLAM (XANAX) 0.25 MG TABLET    Take 1 tablet by mouth 2 times daily as needed for Anxiety    CEPHALEXIN (KEFLEX) 500 MG CAPSULE    Take 1 capsule by mouth 2 times daily    HYDRALAZINE (APRESOLINE) 50 MG TABLET    Take 1 tablet by mouth 3 times daily    LEVOTHYROXINE (SYNTHROID) 100 MCG TABLET    Take 1 tablet by mouth Daily    LOSARTAN (COZAAR) 100 MG TABLET    TAKE 1 TABLET EVERY DAY    METOPROLOL SUCCINATE (TOPROL XL) 100 MG EXTENDED RELEASE TABLET    TAKE 1 TABLET EVERY DAY    OMEPRAZOLE (PRILOSEC) 20 MG DELAYED RELEASE CAPSULE    TAKE 1 CAPSULE EVERY DAY         ALLERGIES:    Amlodipine besylate; Demerol hcl [meperidine]; and Lisinopril    FAMILY HISTORY:       Family History   Problem Relation Age of Onset    Cancer Brother           SOCIAL HISTORY:       Social History     Socioeconomic History    Marital status:       Spouse name: None    Number of children: None    Years of education: None    Highest education level: None   Occupational History    None   Social Needs    Financial resource strain: None    Food insecurity:     Worry: None     Inability: None    Transportation needs:     Medical: None Non-medical: None   Tobacco Use    Smoking status: Former Smoker    Smokeless tobacco: Never Used   Substance and Sexual Activity    Alcohol use: No    Drug use: No    Sexual activity: None   Lifestyle    Physical activity:     Days per week: None     Minutes per session: None    Stress: None   Relationships    Social connections:     Talks on phone: None     Gets together: None     Attends Tenriism service: None     Active member of club or organization: None     Attends meetings of clubs or organizations: None     Relationship status: None    Intimate partner violence:     Fear of current or ex partner: None     Emotionally abused: None     Physically abused: None     Forced sexual activity: None   Other Topics Concern    None   Social History Narrative    None       SCREENINGS:    [place] Coma Scale  Eye Opening: Spontaneous  Best Verbal Response: Oriented  Best Motor Response: Obeys commands  [place] Coma Scale Score: 15        PHYSICAL EXAM:       ED Triage Vitals [04/27/19 2055]   BP Temp Temp Source Pulse Resp SpO2 Height Weight   (!) 184/76 98.3 °F (36.8 °C) Oral 77 18 99 % 5' 5\" (1.651 m) 126 lb (57.2 kg)       Physical Exam    CONSTITUTIONAL: Awake and alert. Cooperative. Well-developed. Well-nourished. Non-toxic. No acute distress. HENT: Normocephalic. Atraumatic. External ears normal, without discharge. No nasal discharge. Oropharynx clear. Mucous membranes moist.  EYES: Conjunctiva non-injected. No scleral icterus. PERRL. EOM's grossly intact. NECK: Supple. Normal ROM. CARDIOVASCULAR: RRR. No Murmer. Intact distal pulses. PULMONARY/CHEST WALL: Effort normal. No tachypnea. Lungs clear to ausculation. ABDOMEN: Normal BS. Soft. Nondistended. Mild suprapubic tenderness to palpate. No guarding. No rigidity. /ANORECTAL: Not assessed  MUSKULOSKELETAL: Normal ROM. No acute deformities. No edema. No tenderness to palpate. SKIN: Warm and dry. No rash.   NEUROLOGICAL: Alert and oriented x 3. GCS 15. CN II-XII grossly intact. Strength is 5/5 in all extremities and sensation is intact. Normal gait.    PSYCHIATRIC: Normal affect        DIAGNOSTICRESULTS:     LABS:    Results for orders placed or performed during the hospital encounter of 11/00/29   Basic Metabolic Panel w/ Reflex to MG   Result Value Ref Range    Sodium 126 (L) 136 - 145 mmol/L    Potassium reflex Magnesium 4.3 3.5 - 5.1 mmol/L    Chloride 91 (L) 99 - 110 mmol/L    CO2 25 21 - 32 mmol/L    Anion Gap 10 3 - 16    Glucose 151 (H) 70 - 99 mg/dL    BUN 19 7 - 20 mg/dL    CREATININE 0.8 0.6 - 1.2 mg/dL    GFR Non-African American >60 >60    GFR African American >60 >60    Calcium 8.9 8.3 - 10.6 mg/dL   CBC Auto Differential   Result Value Ref Range    WBC 8.0 4.0 - 11.0 K/uL    RBC 3.92 (L) 4.00 - 5.20 M/uL    Hemoglobin 12.2 12.0 - 16.0 g/dL    Hematocrit 35.8 (L) 36.0 - 48.0 %    MCV 91.5 80.0 - 100.0 fL    MCH 31.3 26.0 - 34.0 pg    MCHC 34.2 31.0 - 36.0 g/dL    RDW 13.8 12.4 - 15.4 %    Platelets 645 288 - 283 K/uL    MPV 6.8 5.0 - 10.5 fL    Neutrophils % 60.9 %    Lymphocytes % 23.1 %    Monocytes % 9.3 %    Eosinophils % 5.4 %    Basophils % 1.3 %    Neutrophils # 4.9 1.7 - 7.7 K/uL    Lymphocytes # 1.9 1.0 - 5.1 K/uL    Monocytes # 0.7 0.0 - 1.3 K/uL    Eosinophils # 0.4 0.0 - 0.6 K/uL    Basophils # 0.1 0.0 - 0.2 K/uL   Urinalysis Reflex to Culture   Result Value Ref Range    Color, UA Yellow Straw/Yellow    Clarity, UA Clear Clear    Glucose, Ur Negative Negative mg/dL    Bilirubin Urine Negative Negative    Ketones, Urine Negative Negative mg/dL    Specific Gravity, UA 1.010 1.005 - 1.030    Blood, Urine Negative Negative    pH, UA 7.0 5.0 - 8.0    Protein, UA Negative Negative mg/dL    Urobilinogen, Urine 0.2 <2.0 E.U./dL    Nitrite, Urine Negative Negative    Leukocyte Esterase, Urine Negative Negative    Microscopic Examination Not Indicated     Urine Reflex to Culture Not Indicated     Urine Type Not Specified RADIOLOGY:  All x-ray studies areviewed/reviewed by me. Formal interpretations per the radiologist are as follows:      Ct Abdomen Pelvis W Iv Contrast Additional Contrast? None    Result Date: 4/18/2019  EXAMINATION: CT OF THE ABDOMEN AND PELVIS WITH CONTRAST 4/18/2019 7:52 pm TECHNIQUE: CT of the abdomen and pelvis was performed with the administration of intravenous contrast. Multiplanar reformatted images are provided for review. Dose modulation, iterative reconstruction, and/or weight based adjustment of the mA/kV was utilized to reduce the radiation dose to as low as reasonably achievable. COMPARISON: 06/18/2018 HISTORY: ORDERING SYSTEM PROVIDED HISTORY: Pain TECHNOLOGIST PROVIDED HISTORY: If patient is on cardiac monitor and/or pulse ox, they may be taken off cardiac monitor and pulse ox, left on O2 if currently on. All monitors reattached when patient returns to room. Additional Contrast?->None Ordering Physician Provided Reason for Exam: Abdominal Pain (Pt c/o abdominal pain and back pain that started 3 years ago, Pt reports the pain got worse last night) Acuity: Chronic Type of Exam: Ongoing Relevant Medical/Surgical History: h/o gastritis, IBS, ovarian CA, thyroid disease,  SX: thyroidectomy, hysterectomy History of hysterectomy FINDINGS: Motion artifact degrades many of the images. Lower Chest: Minimal dependent lower lobe atelectasis. Organs: The liver, spleen, pancreas, adrenal glands, gallbladder, and kidneys show no acute abnormality. The upper poles of both kidneys, greater on the left, are obscured by motion artifact. GI/Bowel: Portions of the bowel are obscured by motion artifact. No significantly dilated loops of bowel or definite bowel wall thickening. The appendix is upper limits in caliber measuring 8 mm, but tapers distally. No large amount of pericecal stranding is present. There are large number of sigmoid colonic diverticula without evidence of active inflammation.  Pelvis: The urinary bladder is nondistended. The uterus is surgically absent. Peritoneum/Retroperitoneum: The aorta is normal in caliber and course, showing calcifications. Bones/Soft Tissues: Mild degenerative anterolisthesis at L4-5 is again seen. A chronic compression deformity of T12 is also again noted. No acute bony abnormality. Small amount of free pelvic fluid. Small amount of free pelvic fluid. Appendix upper limits of normal in caliber but tapers distally. Acute appendicitis is considered unlikely but not excluded. PROCEDURES:   N/A    CRITICAL CARE TIME:       None      CONSULTS:  None      EMERGENCY DEPARTMENT COURSE and DIFFERENTIAL DIAGNOSIS/MDM:   Vitals:    Vitals:    04/27/19 2055   BP: (!) 184/76   Pulse: 77   Resp: 18   Temp: 98.3 °F (36.8 °C)   TempSrc: Oral   SpO2: 99%   Weight: 126 lb (57.2 kg)   Height: 5' 5\" (1.651 m)       Patient was given the following medications:  Medications   0.9 % sodium chloride bolus (500 mLs Intravenous New Bag 4/27/19 2144)   phenazopyridine (PYRIDIUM) tablet 200 mg (200 mg Oral Given 4/27/19 2144)         I have evaluated this patient in the ED. She is here with the sensation to urinate but feels that she is only able to go a little bit each time she tries. She reports she has been drinking plenty of fluids. A bladder scan upon arrival to the ED reveals less than 50 and also urine in her bladder. She was given a 500 mL bolus of normal saline IV and Pyridium 200 mg for her symptoms while labs and urine are pending. The patient has a normal CBC. On BMP she has hyponatremia at 126, a low chloride at 91 and a slightly elevated glucose at 151. This patient has chronic hyponatremia. She was hospitalized when it was 119. Since then, it has been stable in the mid 120s and therefore I don't see an indication to hospitalize her for this. Her urine looks clean. There is no sign of persistent infection. Again there is no sign of urinary retention.   I will have her follow-up with her primary physician but will also provide her with a urology referral secondary to her continued symptoms. I estimate there is LOW risk for ACUTE APPENDICITIS, PYELONEPHRITIS, BOWEL OBSTRUCTION, CHOLECYSTITIS, DIVERTICULITIS, INCARCERATED HERNIA, PANCREATITIS, PERFORATED BOWEL or ULCER, thus I consider the discharge disposition reasonable. Also, there is no evidence or peritonitis, sepsis, or toxicity. Jeffery Smith and I have discussed the diagnosis and risks, and we agree with discharging home to follow-up with their primary doctor. We also discussed returning to the Emergency Department immediately if new or worsening symptoms occur. We have discussed the symptoms which are most concerning (e.g., bloody stool, fever, changing or worsening pain, vomiting) that necessitate immediate return. FINAL IMPRESSION:      1. Bladder spasm    2.  Chronic hyponatremia          DISPOSITION/PLAN:   DISPOSITION     DISCHARGE    PATIENT REFERRED TO:  Fox Vergara MD  [phone]    Call in 2 days  Follow up early this coming week    [address] [place] [address]  Schedule an appointment as soon as possible for a visit       [facility]  ED  [address]  Go to   As needed      DISCHARGE MEDICATIONS:  New Prescriptions    PHENAZOPYRIDINE (PYRIDIUM) 100 MG TABLET    Take 1 tablet by mouth 3 times daily as needed for Pain                  (Please note thatportions of this note were completed with a voice recognition program.  Efforts were made to edit the dictations, but occasionally words are mis-transcribed.)    Yves Ruiz PA-C (electronicallysigned)              Tanisha Pisano, [place]  04/27/19 [address] [place] [address]  04/27/19 2350

## 2019-11-08 ENCOUNTER — APPOINTMENT (OUTPATIENT)
Dept: CT IMAGING | Age: 84
DRG: 689 | End: 2019-11-08
Payer: MEDICARE

## 2019-11-08 ENCOUNTER — APPOINTMENT (OUTPATIENT)
Dept: GENERAL RADIOLOGY | Age: 84
DRG: 689 | End: 2019-11-08
Payer: MEDICARE

## 2019-11-08 ENCOUNTER — HOSPITAL ENCOUNTER (OUTPATIENT)
Age: 84
Setting detail: OBSERVATION
Discharge: NURSING, REHAB OR LONG-TERM CARE | DRG: 689 | End: 2019-11-11
Attending: EMERGENCY MEDICINE | Admitting: INTERNAL MEDICINE
Payer: MEDICARE

## 2019-11-08 DIAGNOSIS — S09.90XA CLOSED HEAD INJURY, INITIAL ENCOUNTER: ICD-10-CM

## 2019-11-08 DIAGNOSIS — R07.9 CHEST PAIN, UNSPECIFIED TYPE: ICD-10-CM

## 2019-11-08 DIAGNOSIS — R77.8 ELEVATED TROPONIN: Primary | ICD-10-CM
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
## 2019-11-08 DIAGNOSIS — S81.802A MULTIPLE OPEN WOUNDS OF LEFT LOWER EXTREMITY, INITIAL ENCOUNTER: ICD-10-CM

## 2019-11-08 DIAGNOSIS — R06.02 SHORTNESS OF BREATH: ICD-10-CM

## 2019-11-08 DIAGNOSIS — F41.9 ANXIETY: ICD-10-CM

## 2019-11-08 DIAGNOSIS — N30.00 ACUTE CYSTITIS WITHOUT HEMATURIA: ICD-10-CM

## 2019-11-08 DIAGNOSIS — S81.812A SKIN TEAR OF LEFT LOWER LEG WITHOUT COMPLICATION, INITIAL ENCOUNTER: ICD-10-CM

## 2019-11-08 DIAGNOSIS — M79.672 LEFT FOOT PAIN: ICD-10-CM
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
## 2019-11-08 LAB
A/G RATIO: 1.3 (ref 1.1–2.2)
ALBUMIN SERPL-MCNC: 3.7 G/DL (ref 3.4–5)
ALP BLD-CCNC: 80 U/L (ref 40–129)
ALT SERPL-CCNC: 20 U/L (ref 10–40)
ANION GAP SERPL CALCULATED.3IONS-SCNC: 14 MMOL/L (ref 3–16)
AST SERPL-CCNC: 18 U/L (ref 15–37)
BACTERIA: ABNORMAL /HPF
BASOPHILS ABSOLUTE: 0 K/UL (ref 0–0.2)
BASOPHILS RELATIVE PERCENT: 0.4 %
BILIRUB SERPL-MCNC: 0.3 MG/DL (ref 0–1)
BILIRUBIN URINE: NEGATIVE
BLOOD, URINE: NEGATIVE
BUN BLDV-MCNC: 25 MG/DL (ref 7–20)
CALCIUM SERPL-MCNC: 9.1 MG/DL (ref 8.3–10.6)
CHLORIDE BLD-SCNC: 92 MMOL/L (ref 99–110)
CLARITY: CLEAR
CO2: 25 MMOL/L (ref 21–32)
COLOR: YELLOW
CREAT SERPL-MCNC: 0.9 MG/DL (ref 0.6–1.2)
EKG ATRIAL RATE: 58 BPM
EKG DIAGNOSIS: NORMAL
EKG P AXIS: 33 DEGREES
EKG P-R INTERVAL: 192 MS
EKG Q-T INTERVAL: 400 MS
EKG QRS DURATION: 80 MS
EKG QTC CALCULATION (BAZETT): 392 MS
EKG R AXIS: 12 DEGREES
EKG T AXIS: 130 DEGREES
EKG VENTRICULAR RATE: 58 BPM
EOSINOPHILS ABSOLUTE: 0.3 K/UL (ref 0–0.6)
EOSINOPHILS RELATIVE PERCENT: 3.2 %
GFR AFRICAN AMERICAN: >60
GFR NON-AFRICAN AMERICAN: 60
GLOBULIN: 2.9 G/DL
GLUCOSE BLD-MCNC: 123 MG/DL (ref 70–99)
GLUCOSE URINE: NEGATIVE MG/DL
HCT VFR BLD CALC: 39.1 % (ref 36–48)
HEMOGLOBIN: 13.2 G/DL (ref 12–16)
KETONES, URINE: NEGATIVE MG/DL
LACTIC ACID: 1.5 MMOL/L (ref 0.4–2)
LEUKOCYTE ESTERASE, URINE: NEGATIVE
LYMPHOCYTES ABSOLUTE: 2.2 K/UL (ref 1–5.1)
LYMPHOCYTES RELATIVE PERCENT: 22.8 %
MCH RBC QN AUTO: 31.4 PG (ref 26–34)
MCHC RBC AUTO-ENTMCNC: 33.7 G/DL (ref 31–36)
MCV RBC AUTO: 93.1 FL (ref 80–100)
MICROSCOPIC EXAMINATION: YES
MONOCYTES ABSOLUTE: 0.5 K/UL (ref 0–1.3)
MONOCYTES RELATIVE PERCENT: 5 %
NEUTROPHILS ABSOLUTE: 6.6 K/UL (ref 1.7–7.7)
NEUTROPHILS RELATIVE PERCENT: 68.6 %
NITRITE, URINE: NEGATIVE
PDW BLD-RTO: 15.7 % (ref 12.4–15.4)
PH UA: 6 (ref 5–8)
PLATELET # BLD: 311 K/UL (ref 135–450)
PMV BLD AUTO: 6.9 FL (ref 5–10.5)
POTASSIUM SERPL-SCNC: 3.7 MMOL/L (ref 3.5–5.1)
PROTEIN UA: ABNORMAL MG/DL
RBC # BLD: 4.2 M/UL (ref 4–5.2)
RBC UA: ABNORMAL /HPF (ref 0–2)
SODIUM BLD-SCNC: 131 MMOL/L (ref 136–145)
SPECIFIC GRAVITY UA: 1.02 (ref 1–1.03)
TOTAL PROTEIN: 6.6 G/DL (ref 6.4–8.2)
TROPONIN: 0.02 NG/ML
TROPONIN: 0.02 NG/ML
URINE TYPE: ABNORMAL
UROBILINOGEN, URINE: 0.2 E.U./DL
WBC # BLD: 9.7 K/UL (ref 4–11)
WBC UA: ABNORMAL /HPF (ref 0–5)

## 2019-11-08 PROCEDURE — 93010 ELECTROCARDIOGRAM REPORT: CPT | Performed by: INTERNAL MEDICINE

## 2019-11-08 PROCEDURE — 87186 SC STD MICRODIL/AGAR DIL: CPT

## 2019-11-08 PROCEDURE — 2580000003 HC RX 258: Performed by: NURSE PRACTITIONER

## 2019-11-08 PROCEDURE — G0378 HOSPITAL OBSERVATION PER HR: HCPCS

## 2019-11-08 PROCEDURE — 36415 COLL VENOUS BLD VENIPUNCTURE: CPT

## 2019-11-08 PROCEDURE — 81001 URINALYSIS AUTO W/SCOPE: CPT

## 2019-11-08 PROCEDURE — 71046 X-RAY EXAM CHEST 2 VIEWS: CPT

## 2019-11-08 PROCEDURE — 96365 THER/PROPH/DIAG IV INF INIT: CPT

## 2019-11-08 PROCEDURE — 70450 CT HEAD/BRAIN W/O DYE: CPT

## 2019-11-08 PROCEDURE — 6360000002 HC RX W HCPCS: Performed by: NURSE PRACTITIONER

## 2019-11-08 PROCEDURE — 51701 INSERT BLADDER CATHETER: CPT

## 2019-11-08 PROCEDURE — 99285 EMERGENCY DEPT VISIT HI MDM: CPT

## 2019-11-08 PROCEDURE — 6370000000 HC RX 637 (ALT 250 FOR IP): Performed by: INTERNAL MEDICINE

## 2019-11-08 PROCEDURE — 72125 CT NECK SPINE W/O DYE: CPT

## 2019-11-08 PROCEDURE — 87077 CULTURE AEROBIC IDENTIFY: CPT

## 2019-11-08 PROCEDURE — 83605 ASSAY OF LACTIC ACID: CPT

## 2019-11-08 PROCEDURE — 85025 COMPLETE CBC W/AUTO DIFF WBC: CPT

## 2019-11-08 PROCEDURE — 87086 URINE CULTURE/COLONY COUNT: CPT

## 2019-11-08 PROCEDURE — 80053 COMPREHEN METABOLIC PANEL: CPT

## 2019-11-08 PROCEDURE — 84484 ASSAY OF TROPONIN QUANT: CPT

## 2019-11-08 PROCEDURE — 93005 ELECTROCARDIOGRAM TRACING: CPT | Performed by: EMERGENCY MEDICINE

## 2019-11-08 PROCEDURE — 2580000003 HC RX 258: Performed by: INTERNAL MEDICINE

## 2019-11-08 RX ORDER — TRAMADOL HYDROCHLORIDE 50 MG/1
50 TABLET ORAL EVERY 8 HOURS PRN
Status: DISCONTINUED | OUTPATIENT
Start: 2019-11-08 | End: 2019-11-11 | Stop reason: HOSPADM

## 2019-11-08 RX ORDER — NIFEDIPINE 30 MG/1
30 TABLET, FILM COATED, EXTENDED RELEASE ORAL
Status: ON HOLD | COMMUNITY
Start: 2019-10-10 | End: 2020-05-14 | Stop reason: HOSPADM

## 2019-11-08 RX ORDER — TRAMADOL HYDROCHLORIDE 50 MG/1
TABLET ORAL
Status: ON HOLD | COMMUNITY
Start: 2019-10-24 | End: 2019-11-11 | Stop reason: HOSPADM

## 2019-11-08 RX ORDER — METOPROLOL SUCCINATE 50 MG/1
100 TABLET, EXTENDED RELEASE ORAL DAILY
Status: DISCONTINUED | OUTPATIENT
Start: 2019-11-08 | End: 2019-11-11 | Stop reason: HOSPADM

## 2019-11-08 RX ORDER — AMIODARONE HYDROCHLORIDE 200 MG/1
200 TABLET ORAL DAILY
Status: DISCONTINUED | OUTPATIENT
Start: 2019-11-08 | End: 2019-11-09

## 2019-11-08 RX ORDER — NIFEDIPINE 30 MG/1
30 TABLET, FILM COATED, EXTENDED RELEASE ORAL DAILY
Status: DISCONTINUED | OUTPATIENT
Start: 2019-11-08 | End: 2019-11-11 | Stop reason: HOSPADM

## 2019-11-08 RX ORDER — PANTOPRAZOLE SODIUM 40 MG/1
40 TABLET, DELAYED RELEASE ORAL
Status: DISCONTINUED | OUTPATIENT
Start: 2019-11-09 | End: 2019-11-11 | Stop reason: HOSPADM

## 2019-11-08 RX ORDER — SODIUM CHLORIDE 0.9 % (FLUSH) 0.9 %
10 SYRINGE (ML) INJECTION EVERY 12 HOURS SCHEDULED
Status: DISCONTINUED | OUTPATIENT
Start: 2019-11-08 | End: 2019-11-11 | Stop reason: HOSPADM

## 2019-11-08 RX ORDER — TRAMADOL HYDROCHLORIDE 50 MG/1
50 TABLET ORAL
Status: ON HOLD | COMMUNITY
Start: 2019-10-10 | End: 2019-11-11 | Stop reason: HOSPADM

## 2019-11-08 RX ORDER — ASPIRIN 81 MG/1
81 TABLET ORAL DAILY
COMMUNITY

## 2019-11-08 RX ORDER — ATORVASTATIN CALCIUM 10 MG/1
40 TABLET, FILM COATED ORAL NIGHTLY
Status: DISCONTINUED | OUTPATIENT
Start: 2019-11-08 | End: 2019-11-09

## 2019-11-08 RX ORDER — AMIODARONE HYDROCHLORIDE 200 MG/1
TABLET ORAL
Refills: 2 | COMMUNITY
Start: 2019-08-24 | End: 2020-05-11 | Stop reason: ALTCHOICE

## 2019-11-08 RX ORDER — ONDANSETRON 2 MG/ML
4 INJECTION INTRAMUSCULAR; INTRAVENOUS EVERY 6 HOURS PRN
Status: DISCONTINUED | OUTPATIENT
Start: 2019-11-08 | End: 2019-11-11 | Stop reason: HOSPADM

## 2019-11-08 RX ORDER — SODIUM CHLORIDE 0.9 % (FLUSH) 0.9 %
10 SYRINGE (ML) INJECTION PRN
Status: DISCONTINUED | OUTPATIENT
Start: 2019-11-08 | End: 2019-11-11 | Stop reason: HOSPADM

## 2019-11-08 RX ORDER — NIFEDIPINE 30 MG/1
TABLET, FILM COATED, EXTENDED RELEASE ORAL
Refills: 11 | COMMUNITY
Start: 2019-09-17

## 2019-11-08 RX ORDER — ASPIRIN 81 MG/1
81 TABLET ORAL DAILY
Status: DISCONTINUED | OUTPATIENT
Start: 2019-11-09 | End: 2019-11-11 | Stop reason: HOSPADM

## 2019-11-08 RX ORDER — ASPIRIN 81 MG/1
81 TABLET, CHEWABLE ORAL DAILY
Status: DISCONTINUED | OUTPATIENT
Start: 2019-11-09 | End: 2019-11-08 | Stop reason: SDUPTHER

## 2019-11-08 RX ORDER — LEVOTHYROXINE SODIUM 0.1 MG/1
100 TABLET ORAL DAILY
Status: DISCONTINUED | OUTPATIENT
Start: 2019-11-09 | End: 2019-11-11 | Stop reason: HOSPADM

## 2019-11-08 RX ORDER — LOSARTAN POTASSIUM 100 MG/1
100 TABLET ORAL DAILY
Status: DISCONTINUED | OUTPATIENT
Start: 2019-11-08 | End: 2019-11-11 | Stop reason: HOSPADM

## 2019-11-08 RX ORDER — HYDRALAZINE HYDROCHLORIDE 50 MG/1
50 TABLET, FILM COATED ORAL 3 TIMES DAILY
Status: DISCONTINUED | OUTPATIENT
Start: 2019-11-08 | End: 2019-11-11 | Stop reason: HOSPADM

## 2019-11-08 RX ADMIN — CEFTRIAXONE SODIUM 1 G: 1 INJECTION, POWDER, FOR SOLUTION INTRAMUSCULAR; INTRAVENOUS at 16:16

## 2019-11-08 RX ADMIN — TRAMADOL HYDROCHLORIDE 50 MG: 50 TABLET, COATED ORAL at 19:59

## 2019-11-08 RX ADMIN — NIFEDIPINE 30 MG: 30 TABLET, FILM COATED, EXTENDED RELEASE ORAL at 19:55

## 2019-11-08 RX ADMIN — HYDRALAZINE HYDROCHLORIDE 50 MG: 50 TABLET, FILM COATED ORAL at 23:01

## 2019-11-08 RX ADMIN — Medication 10 ML: at 19:49

## 2019-11-08 ASSESSMENT — PAIN DESCRIPTION - PAIN TYPE
TYPE: ACUTE PAIN

## 2019-11-08 ASSESSMENT — PAIN DESCRIPTION - LOCATION
LOCATION: ABDOMEN;RIB CAGE
LOCATION: ABDOMEN
LOCATION: ABDOMEN

## 2019-11-08 ASSESSMENT — PAIN DESCRIPTION - DESCRIPTORS: DESCRIPTORS: CRAMPING

## 2019-11-08 ASSESSMENT — PAIN SCALES - GENERAL
PAINLEVEL_OUTOF10: 6
PAINLEVEL_OUTOF10: 5
PAINLEVEL_OUTOF10: 10
PAINLEVEL_OUTOF10: 7

## 2019-11-08 ASSESSMENT — PAIN DESCRIPTION - FREQUENCY: FREQUENCY: CONTINUOUS

## 2019-11-08 ASSESSMENT — PAIN DESCRIPTION - ORIENTATION
ORIENTATION: UPPER
ORIENTATION: LOWER

## 2019-11-08 ASSESSMENT — PAIN DESCRIPTION - ONSET: ONSET: ON-GOING

## 2019-11-09 LAB
ANION GAP SERPL CALCULATED.3IONS-SCNC: 10 MMOL/L (ref 3–16)
BUN BLDV-MCNC: 21 MG/DL (ref 7–20)
CALCIUM SERPL-MCNC: 8.7 MG/DL (ref 8.3–10.6)
CHLORIDE BLD-SCNC: 94 MMOL/L (ref 99–110)
CO2: 27 MMOL/L (ref 21–32)
CREAT SERPL-MCNC: 0.8 MG/DL (ref 0.6–1.2)
EKG ATRIAL RATE: 67 BPM
EKG DIAGNOSIS: NORMAL
EKG P AXIS: 38 DEGREES
EKG P-R INTERVAL: 188 MS
EKG Q-T INTERVAL: 472 MS
EKG QRS DURATION: 82 MS
EKG QTC CALCULATION (BAZETT): 498 MS
EKG R AXIS: 20 DEGREES
EKG T AXIS: 76 DEGREES
EKG VENTRICULAR RATE: 67 BPM
GFR AFRICAN AMERICAN: >60
GFR NON-AFRICAN AMERICAN: >60
GLUCOSE BLD-MCNC: 124 MG/DL (ref 70–99)
HCT VFR BLD CALC: 34.9 % (ref 36–48)
HEMOGLOBIN: 12 G/DL (ref 12–16)
MAGNESIUM: 1.9 MG/DL (ref 1.8–2.4)
MCH RBC QN AUTO: 32 PG (ref 26–34)
MCHC RBC AUTO-ENTMCNC: 34.4 G/DL (ref 31–36)
MCV RBC AUTO: 92.8 FL (ref 80–100)
PDW BLD-RTO: 15.6 % (ref 12.4–15.4)
PLATELET # BLD: 273 K/UL (ref 135–450)
PMV BLD AUTO: 7 FL (ref 5–10.5)
POTASSIUM REFLEX MAGNESIUM: 3.4 MMOL/L (ref 3.5–5.1)
RBC # BLD: 3.76 M/UL (ref 4–5.2)
SODIUM BLD-SCNC: 131 MMOL/L (ref 136–145)
WBC # BLD: 9.2 K/UL (ref 4–11)

## 2019-11-09 PROCEDURE — 6360000002 HC RX W HCPCS: Performed by: INTERNAL MEDICINE

## 2019-11-09 PROCEDURE — 96372 THER/PROPH/DIAG INJ SC/IM: CPT

## 2019-11-09 PROCEDURE — 6370000000 HC RX 637 (ALT 250 FOR IP): Performed by: NURSE PRACTITIONER

## 2019-11-09 PROCEDURE — 97162 PT EVAL MOD COMPLEX 30 MIN: CPT

## 2019-11-09 PROCEDURE — 6370000000 HC RX 637 (ALT 250 FOR IP): Performed by: INTERNAL MEDICINE

## 2019-11-09 PROCEDURE — 93010 ELECTROCARDIOGRAM REPORT: CPT | Performed by: INTERNAL MEDICINE

## 2019-11-09 PROCEDURE — G0378 HOSPITAL OBSERVATION PER HR: HCPCS

## 2019-11-09 PROCEDURE — 97530 THERAPEUTIC ACTIVITIES: CPT

## 2019-11-09 PROCEDURE — 96366 THER/PROPH/DIAG IV INF ADDON: CPT

## 2019-11-09 PROCEDURE — 83735 ASSAY OF MAGNESIUM: CPT

## 2019-11-09 PROCEDURE — 93005 ELECTROCARDIOGRAM TRACING: CPT | Performed by: NURSE PRACTITIONER

## 2019-11-09 PROCEDURE — 85027 COMPLETE CBC AUTOMATED: CPT

## 2019-11-09 PROCEDURE — 2580000003 HC RX 258: Performed by: INTERNAL MEDICINE

## 2019-11-09 PROCEDURE — 80048 BASIC METABOLIC PNL TOTAL CA: CPT

## 2019-11-09 PROCEDURE — 97166 OT EVAL MOD COMPLEX 45 MIN: CPT

## 2019-11-09 RX ORDER — ALPRAZOLAM 0.25 MG/1
0.25 TABLET ORAL 2 TIMES DAILY PRN
Status: DISCONTINUED | OUTPATIENT
Start: 2019-11-09 | End: 2019-11-11 | Stop reason: HOSPADM

## 2019-11-09 RX ORDER — LIDOCAINE 4 G/G
1 PATCH TOPICAL DAILY
Status: DISCONTINUED | OUTPATIENT
Start: 2019-11-09 | End: 2019-11-11 | Stop reason: HOSPADM

## 2019-11-09 RX ADMIN — ENOXAPARIN SODIUM 40 MG: 40 INJECTION SUBCUTANEOUS at 08:19

## 2019-11-09 RX ADMIN — TRAMADOL HYDROCHLORIDE 50 MG: 50 TABLET, COATED ORAL at 21:34

## 2019-11-09 RX ADMIN — Medication 10 ML: at 21:32

## 2019-11-09 RX ADMIN — CEFTRIAXONE SODIUM 1 G: 1 INJECTION, POWDER, FOR SOLUTION INTRAMUSCULAR; INTRAVENOUS at 08:19

## 2019-11-09 RX ADMIN — LEVOTHYROXINE SODIUM 100 MCG: 100 TABLET ORAL at 07:34

## 2019-11-09 RX ADMIN — ASPIRIN 81 MG: 81 TABLET, COATED ORAL at 08:19

## 2019-11-09 RX ADMIN — METOPROLOL SUCCINATE 100 MG: 50 TABLET, EXTENDED RELEASE ORAL at 08:18

## 2019-11-09 RX ADMIN — PANTOPRAZOLE SODIUM 40 MG: 40 TABLET, DELAYED RELEASE ORAL at 07:34

## 2019-11-09 RX ADMIN — HYDRALAZINE HYDROCHLORIDE 50 MG: 50 TABLET, FILM COATED ORAL at 21:31

## 2019-11-09 RX ADMIN — LOSARTAN POTASSIUM 100 MG: 100 TABLET, FILM COATED ORAL at 08:18

## 2019-11-09 ASSESSMENT — PAIN SCALES - GENERAL
PAINLEVEL_OUTOF10: 0
PAINLEVEL_OUTOF10: 10

## 2019-11-10 LAB
ANION GAP SERPL CALCULATED.3IONS-SCNC: 11 MMOL/L (ref 3–16)
BUN BLDV-MCNC: 23 MG/DL (ref 7–20)
CALCIUM SERPL-MCNC: 9.3 MG/DL (ref 8.3–10.6)
CHLORIDE BLD-SCNC: 94 MMOL/L (ref 99–110)
CO2: 27 MMOL/L (ref 21–32)
CREAT SERPL-MCNC: 1.1 MG/DL (ref 0.6–1.2)
GFR AFRICAN AMERICAN: 57
GFR NON-AFRICAN AMERICAN: 47
GLUCOSE BLD-MCNC: 148 MG/DL (ref 70–99)
ORGANISM: ABNORMAL
POTASSIUM SERPL-SCNC: 3.7 MMOL/L (ref 3.5–5.1)
SODIUM BLD-SCNC: 132 MMOL/L (ref 136–145)
URINE CULTURE, ROUTINE: ABNORMAL

## 2019-11-10 PROCEDURE — 80048 BASIC METABOLIC PNL TOTAL CA: CPT

## 2019-11-10 PROCEDURE — 2580000003 HC RX 258: Performed by: INTERNAL MEDICINE

## 2019-11-10 PROCEDURE — 6370000000 HC RX 637 (ALT 250 FOR IP): Performed by: NURSE PRACTITIONER

## 2019-11-10 PROCEDURE — 6360000002 HC RX W HCPCS: Performed by: INTERNAL MEDICINE

## 2019-11-10 PROCEDURE — 6370000000 HC RX 637 (ALT 250 FOR IP): Performed by: INTERNAL MEDICINE

## 2019-11-10 PROCEDURE — 96372 THER/PROPH/DIAG INJ SC/IM: CPT

## 2019-11-10 PROCEDURE — G0378 HOSPITAL OBSERVATION PER HR: HCPCS

## 2019-11-10 RX ADMIN — ENOXAPARIN SODIUM 40 MG: 40 INJECTION SUBCUTANEOUS at 08:51

## 2019-11-10 RX ADMIN — ALPRAZOLAM 0.25 MG: 0.25 TABLET ORAL at 10:44

## 2019-11-10 RX ADMIN — LEVOTHYROXINE SODIUM 100 MCG: 100 TABLET ORAL at 05:48

## 2019-11-10 RX ADMIN — TRAMADOL HYDROCHLORIDE 50 MG: 50 TABLET, COATED ORAL at 20:56

## 2019-11-10 RX ADMIN — HYDRALAZINE HYDROCHLORIDE 50 MG: 50 TABLET, FILM COATED ORAL at 08:50

## 2019-11-10 RX ADMIN — HYDRALAZINE HYDROCHLORIDE 50 MG: 50 TABLET, FILM COATED ORAL at 15:37

## 2019-11-10 RX ADMIN — Medication 10 ML: at 20:50

## 2019-11-10 RX ADMIN — ALPRAZOLAM 0.25 MG: 0.25 TABLET ORAL at 22:44

## 2019-11-10 RX ADMIN — LOSARTAN POTASSIUM 100 MG: 100 TABLET, FILM COATED ORAL at 08:50

## 2019-11-10 RX ADMIN — PANTOPRAZOLE SODIUM 40 MG: 40 TABLET, DELAYED RELEASE ORAL at 05:48

## 2019-11-10 RX ADMIN — ASPIRIN 81 MG: 81 TABLET, COATED ORAL at 08:51

## 2019-11-10 RX ADMIN — HYDRALAZINE HYDROCHLORIDE 50 MG: 50 TABLET, FILM COATED ORAL at 20:50

## 2019-11-10 RX ADMIN — METOPROLOL SUCCINATE 100 MG: 50 TABLET, EXTENDED RELEASE ORAL at 08:50

## 2019-11-10 ASSESSMENT — PAIN DESCRIPTION - PAIN TYPE: TYPE: ACUTE PAIN

## 2019-11-10 ASSESSMENT — PAIN SCALES - GENERAL: PAINLEVEL_OUTOF10: 8

## 2019-11-10 ASSESSMENT — PAIN DESCRIPTION - ORIENTATION: ORIENTATION: LEFT

## 2019-11-10 ASSESSMENT — PAIN DESCRIPTION - LOCATION: LOCATION: RIB CAGE

## 2019-11-11 VITALS
DIASTOLIC BLOOD PRESSURE: 79 MMHG | HEART RATE: 69 BPM | BODY MASS INDEX: 21.85 KG/M2 | WEIGHT: 131.17 LBS | TEMPERATURE: 97.6 F | SYSTOLIC BLOOD PRESSURE: 186 MMHG | OXYGEN SATURATION: 96 % | HEIGHT: 65 IN | RESPIRATION RATE: 16 BRPM

## 2019-11-11 PROBLEM — R06.02 SOB (SHORTNESS OF BREATH): Status: ACTIVE | Noted: 2019-11-11

## 2019-11-11 PROCEDURE — 2580000003 HC RX 258: Performed by: INTERNAL MEDICINE

## 2019-11-11 PROCEDURE — 6370000000 HC RX 637 (ALT 250 FOR IP): Performed by: REGISTERED NURSE

## 2019-11-11 PROCEDURE — 1200000000 HC SEMI PRIVATE

## 2019-11-11 PROCEDURE — 97110 THERAPEUTIC EXERCISES: CPT

## 2019-11-11 PROCEDURE — 96372 THER/PROPH/DIAG INJ SC/IM: CPT

## 2019-11-11 PROCEDURE — 97116 GAIT TRAINING THERAPY: CPT

## 2019-11-11 PROCEDURE — G0378 HOSPITAL OBSERVATION PER HR: HCPCS

## 2019-11-11 PROCEDURE — 97530 THERAPEUTIC ACTIVITIES: CPT

## 2019-11-11 PROCEDURE — 6370000000 HC RX 637 (ALT 250 FOR IP): Performed by: INTERNAL MEDICINE

## 2019-11-11 PROCEDURE — 6370000000 HC RX 637 (ALT 250 FOR IP): Performed by: NURSE PRACTITIONER

## 2019-11-11 PROCEDURE — 6360000002 HC RX W HCPCS: Performed by: INTERNAL MEDICINE

## 2019-11-11 RX ORDER — TRAMADOL HYDROCHLORIDE 50 MG/1
50 TABLET ORAL EVERY 8 HOURS PRN
Qty: 9 TABLET | Refills: 0 | Status: SHIPPED | OUTPATIENT
Start: 2019-11-11 | End: 2019-11-14

## 2019-11-11 RX ORDER — CEFDINIR 300 MG/1
300 CAPSULE ORAL EVERY 12 HOURS SCHEDULED
Qty: 10 CAPSULE | Refills: 0 | Status: SHIPPED | OUTPATIENT
Start: 2019-11-11 | End: 2019-11-16

## 2019-11-11 RX ORDER — CEFDINIR 300 MG/1
300 CAPSULE ORAL EVERY 12 HOURS SCHEDULED
Status: DISCONTINUED | OUTPATIENT
Start: 2019-11-11 | End: 2019-11-11 | Stop reason: HOSPADM

## 2019-11-11 RX ORDER — ALPRAZOLAM 0.25 MG/1
0.25 TABLET ORAL 2 TIMES DAILY PRN
Qty: 6 TABLET | Refills: 0 | Status: SHIPPED | OUTPATIENT
Start: 2019-11-11 | End: 2019-11-14

## 2019-11-11 RX ADMIN — PANTOPRAZOLE SODIUM 40 MG: 40 TABLET, DELAYED RELEASE ORAL at 07:23

## 2019-11-11 RX ADMIN — TRAMADOL HYDROCHLORIDE 50 MG: 50 TABLET, COATED ORAL at 08:30

## 2019-11-11 RX ADMIN — LEVOTHYROXINE SODIUM 100 MCG: 100 TABLET ORAL at 07:23

## 2019-11-11 RX ADMIN — LOSARTAN POTASSIUM 100 MG: 100 TABLET, FILM COATED ORAL at 08:30

## 2019-11-11 RX ADMIN — ASPIRIN 81 MG: 81 TABLET, COATED ORAL at 08:30

## 2019-11-11 RX ADMIN — HYDRALAZINE HYDROCHLORIDE 50 MG: 50 TABLET, FILM COATED ORAL at 08:31

## 2019-11-11 RX ADMIN — CEFDINIR 300 MG: 300 CAPSULE ORAL at 11:50

## 2019-11-11 RX ADMIN — METOPROLOL SUCCINATE 100 MG: 50 TABLET, EXTENDED RELEASE ORAL at 07:23

## 2019-11-11 RX ADMIN — Medication 10 ML: at 08:32

## 2019-11-11 RX ADMIN — ENOXAPARIN SODIUM 40 MG: 40 INJECTION SUBCUTANEOUS at 08:31

## 2019-11-11 ASSESSMENT — PAIN SCALES - GENERAL
PAINLEVEL_OUTOF10: 4
PAINLEVEL_OUTOF10: 7

## 2019-11-11 ASSESSMENT — PAIN DESCRIPTION - LOCATION: LOCATION: LEG

## 2019-11-11 ASSESSMENT — PAIN DESCRIPTION - PAIN TYPE: TYPE: ACUTE PAIN

## 2019-11-11 ASSESSMENT — PAIN DESCRIPTION - ORIENTATION: ORIENTATION: LEFT

## 2019-12-23 ENCOUNTER — HOSPITAL ENCOUNTER (INPATIENT)
Age: 84
LOS: 4 days | Discharge: NURSING, REHAB OR LONG-TERM CARE | DRG: 070 | End: 2019-12-27
Attending: EMERGENCY MEDICINE | Admitting: INTERNAL MEDICINE
Payer: MEDICARE

## 2019-12-23 ENCOUNTER — APPOINTMENT (OUTPATIENT)
Dept: CT IMAGING | Age: 84
DRG: 070 | End: 2019-12-23
Payer: MEDICARE

## 2019-12-23 ENCOUNTER — APPOINTMENT (OUTPATIENT)
Dept: GENERAL RADIOLOGY | Age: 84
DRG: 070 | End: 2019-12-23
Payer: MEDICARE

## 2019-12-23 PROBLEM — G93.41 ACUTE METABOLIC ENCEPHALOPATHY: Status: ACTIVE | Noted: 2019-12-23

## 2019-12-23 LAB
A/G RATIO: 1.4 (ref 1.1–2.2)
ALBUMIN SERPL-MCNC: 3.9 G/DL (ref 3.4–5)
ALP BLD-CCNC: 112 U/L (ref 40–129)
ALT SERPL-CCNC: 16 U/L (ref 10–40)
ANION GAP SERPL CALCULATED.3IONS-SCNC: 15 MMOL/L (ref 3–16)
AST SERPL-CCNC: 35 U/L (ref 15–37)
BASOPHILS ABSOLUTE: 0.1 K/UL (ref 0–0.2)
BASOPHILS RELATIVE PERCENT: 1 %
BILIRUB SERPL-MCNC: 0.3 MG/DL (ref 0–1)
BILIRUBIN URINE: NEGATIVE
BLOOD, URINE: NEGATIVE
BUN BLDV-MCNC: 37 MG/DL (ref 7–20)
CALCIUM SERPL-MCNC: 9.5 MG/DL (ref 8.3–10.6)
CHLORIDE BLD-SCNC: 97 MMOL/L (ref 99–110)
CLARITY: CLEAR
CO2: 21 MMOL/L (ref 21–32)
COLOR: YELLOW
CREAT SERPL-MCNC: 1.5 MG/DL (ref 0.6–1.2)
EOSINOPHILS ABSOLUTE: 0.1 K/UL (ref 0–0.6)
EOSINOPHILS RELATIVE PERCENT: 1 %
FOLATE: 8.96 NG/ML (ref 4.78–24.2)
GFR AFRICAN AMERICAN: 40
GFR NON-AFRICAN AMERICAN: 33
GLOBULIN: 2.8 G/DL
GLUCOSE BLD-MCNC: 117 MG/DL (ref 70–99)
GLUCOSE URINE: NEGATIVE MG/DL
HCT VFR BLD CALC: 38.1 % (ref 36–48)
HEMOGLOBIN: 12.7 G/DL (ref 12–16)
KETONES, URINE: NEGATIVE MG/DL
LACTIC ACID: 1.8 MMOL/L (ref 0.4–2)
LEUKOCYTE ESTERASE, URINE: NEGATIVE
LYMPHOCYTES ABSOLUTE: 2 K/UL (ref 1–5.1)
LYMPHOCYTES RELATIVE PERCENT: 16.1 %
MAGNESIUM: 2.3 MG/DL (ref 1.8–2.4)
MCH RBC QN AUTO: 31.3 PG (ref 26–34)
MCHC RBC AUTO-ENTMCNC: 33.4 G/DL (ref 31–36)
MCV RBC AUTO: 93.7 FL (ref 80–100)
MICROSCOPIC EXAMINATION: NORMAL
MONOCYTES ABSOLUTE: 0.8 K/UL (ref 0–1.3)
MONOCYTES RELATIVE PERCENT: 6.6 %
NEUTROPHILS ABSOLUTE: 9.1 K/UL (ref 1.7–7.7)
NEUTROPHILS RELATIVE PERCENT: 75.3 %
NITRITE, URINE: NEGATIVE
PDW BLD-RTO: 15 % (ref 12.4–15.4)
PH UA: 6.5 (ref 5–8)
PLATELET # BLD: 373 K/UL (ref 135–450)
PMV BLD AUTO: 7.4 FL (ref 5–10.5)
POTASSIUM SERPL-SCNC: 4.8 MMOL/L (ref 3.5–5.1)
PROTEIN UA: NEGATIVE MG/DL
RBC # BLD: 4.07 M/UL (ref 4–5.2)
SODIUM BLD-SCNC: 133 MMOL/L (ref 136–145)
SPECIFIC GRAVITY UA: 1.02 (ref 1–1.03)
T4 FREE: 1 NG/DL (ref 0.9–1.8)
TOTAL CK: 528 U/L (ref 26–192)
TOTAL PROTEIN: 6.7 G/DL (ref 6.4–8.2)
TROPONIN: 0.03 NG/ML
TSH REFLEX: 40.22 UIU/ML (ref 0.27–4.2)
URINE TYPE: NORMAL
UROBILINOGEN, URINE: 0.2 E.U./DL
VITAMIN B-12: 282 PG/ML (ref 211–911)
WBC # BLD: 12.1 K/UL (ref 4–11)

## 2019-12-23 PROCEDURE — 6360000002 HC RX W HCPCS: Performed by: INTERNAL MEDICINE

## 2019-12-23 PROCEDURE — 1200000000 HC SEMI PRIVATE

## 2019-12-23 PROCEDURE — 82746 ASSAY OF FOLIC ACID SERUM: CPT

## 2019-12-23 PROCEDURE — 80053 COMPREHEN METABOLIC PANEL: CPT

## 2019-12-23 PROCEDURE — 96374 THER/PROPH/DIAG INJ IV PUSH: CPT

## 2019-12-23 PROCEDURE — 71045 X-RAY EXAM CHEST 1 VIEW: CPT

## 2019-12-23 PROCEDURE — 81003 URINALYSIS AUTO W/O SCOPE: CPT

## 2019-12-23 PROCEDURE — 99285 EMERGENCY DEPT VISIT HI MDM: CPT

## 2019-12-23 PROCEDURE — 2580000003 HC RX 258: Performed by: EMERGENCY MEDICINE

## 2019-12-23 PROCEDURE — 83605 ASSAY OF LACTIC ACID: CPT

## 2019-12-23 PROCEDURE — 96372 THER/PROPH/DIAG INJ SC/IM: CPT

## 2019-12-23 PROCEDURE — 82550 ASSAY OF CK (CPK): CPT

## 2019-12-23 PROCEDURE — 36415 COLL VENOUS BLD VENIPUNCTURE: CPT

## 2019-12-23 PROCEDURE — 85025 COMPLETE CBC W/AUTO DIFF WBC: CPT

## 2019-12-23 PROCEDURE — 82607 VITAMIN B-12: CPT

## 2019-12-23 PROCEDURE — 83735 ASSAY OF MAGNESIUM: CPT

## 2019-12-23 PROCEDURE — 2580000003 HC RX 258: Performed by: INTERNAL MEDICINE

## 2019-12-23 PROCEDURE — 6360000002 HC RX W HCPCS: Performed by: EMERGENCY MEDICINE

## 2019-12-23 PROCEDURE — 84439 ASSAY OF FREE THYROXINE: CPT

## 2019-12-23 PROCEDURE — 93005 ELECTROCARDIOGRAM TRACING: CPT | Performed by: EMERGENCY MEDICINE

## 2019-12-23 PROCEDURE — 84443 ASSAY THYROID STIM HORMONE: CPT

## 2019-12-23 PROCEDURE — 84484 ASSAY OF TROPONIN QUANT: CPT

## 2019-12-23 PROCEDURE — 72125 CT NECK SPINE W/O DYE: CPT

## 2019-12-23 PROCEDURE — 70450 CT HEAD/BRAIN W/O DYE: CPT

## 2019-12-23 PROCEDURE — 51701 INSERT BLADDER CATHETER: CPT

## 2019-12-23 RX ORDER — SODIUM CHLORIDE 9 MG/ML
INJECTION, SOLUTION INTRAVENOUS CONTINUOUS
Status: CANCELLED | OUTPATIENT
Start: 2019-12-23

## 2019-12-23 RX ORDER — DEXTROSE AND SODIUM CHLORIDE 5; .9 G/100ML; G/100ML
INJECTION, SOLUTION INTRAVENOUS CONTINUOUS
Status: DISCONTINUED | OUTPATIENT
Start: 2019-12-23 | End: 2019-12-26

## 2019-12-23 RX ORDER — LEVOTHYROXINE SODIUM ANHYDROUS 100 UG/5ML
50 INJECTION, POWDER, LYOPHILIZED, FOR SOLUTION INTRAVENOUS DAILY
Status: DISCONTINUED | OUTPATIENT
Start: 2019-12-23 | End: 2019-12-24

## 2019-12-23 RX ORDER — SODIUM CHLORIDE 0.9 % (FLUSH) 0.9 %
10 SYRINGE (ML) INJECTION PRN
Status: DISCONTINUED | OUTPATIENT
Start: 2019-12-23 | End: 2019-12-27 | Stop reason: HOSPADM

## 2019-12-23 RX ORDER — SODIUM CHLORIDE 0.9 % (FLUSH) 0.9 %
10 SYRINGE (ML) INJECTION EVERY 12 HOURS SCHEDULED
Status: DISCONTINUED | OUTPATIENT
Start: 2019-12-23 | End: 2019-12-27 | Stop reason: HOSPADM

## 2019-12-23 RX ORDER — SODIUM CHLORIDE 9 MG/ML
INJECTION, SOLUTION INTRAVENOUS
Status: DISCONTINUED
Start: 2019-12-23 | End: 2019-12-24 | Stop reason: SDUPTHER

## 2019-12-23 RX ORDER — HYDRALAZINE HYDROCHLORIDE 20 MG/ML
5 INJECTION INTRAMUSCULAR; INTRAVENOUS EVERY 8 HOURS PRN
Status: DISCONTINUED | OUTPATIENT
Start: 2019-12-23 | End: 2019-12-27 | Stop reason: HOSPADM

## 2019-12-23 RX ORDER — HEPARIN SODIUM 5000 [USP'U]/ML
5000 INJECTION, SOLUTION INTRAVENOUS; SUBCUTANEOUS EVERY 8 HOURS SCHEDULED
Status: DISCONTINUED | OUTPATIENT
Start: 2019-12-23 | End: 2019-12-27 | Stop reason: HOSPADM

## 2019-12-23 RX ORDER — LORAZEPAM 2 MG/ML
0.5 INJECTION INTRAMUSCULAR ONCE
Status: COMPLETED | OUTPATIENT
Start: 2019-12-23 | End: 2019-12-23

## 2019-12-23 RX ORDER — ONDANSETRON 2 MG/ML
4 INJECTION INTRAMUSCULAR; INTRAVENOUS EVERY 6 HOURS PRN
Status: DISCONTINUED | OUTPATIENT
Start: 2019-12-23 | End: 2019-12-27 | Stop reason: HOSPADM

## 2019-12-23 RX ORDER — 0.9 % SODIUM CHLORIDE 0.9 %
500 INTRAVENOUS SOLUTION INTRAVENOUS ONCE
Status: COMPLETED | OUTPATIENT
Start: 2019-12-23 | End: 2019-12-23

## 2019-12-23 RX ADMIN — DEXTROSE AND SODIUM CHLORIDE: 5; 900 INJECTION, SOLUTION INTRAVENOUS at 22:40

## 2019-12-23 RX ADMIN — SODIUM CHLORIDE 500 ML: 9 INJECTION, SOLUTION INTRAVENOUS at 15:59

## 2019-12-23 RX ADMIN — HEPARIN SODIUM 5000 UNITS: 5000 INJECTION INTRAVENOUS; SUBCUTANEOUS at 18:40

## 2019-12-23 RX ADMIN — LORAZEPAM 0.5 MG: 2 INJECTION, SOLUTION INTRAMUSCULAR; INTRAVENOUS at 15:59

## 2019-12-23 ASSESSMENT — PAIN DESCRIPTION - PAIN TYPE: TYPE: CHRONIC PAIN

## 2019-12-23 ASSESSMENT — PAIN DESCRIPTION - LOCATION: LOCATION: BACK

## 2019-12-23 NOTE — ED NOTES
Pt continuing to lie in stretcher with eyes closed but mumbling. Pt will open eyes spontaneously and to name, but otherwise does not respond to voice. Bed check remains in place. Denies any further needs, call light within reach, will continue to monitor.         Joslyn Bean RN  12/23/19 [id number]

## 2019-12-23 NOTE — ED NOTES
Pancho [email or web address]  Re: admit.  acute kidney injury, hallucinations, dehydration, tremors, AMS per [email or web address]     Johney Schneider Naegele  12/23/19 0679

## 2019-12-23 NOTE — ED NOTES
Pt still very tense and having mild tremors. Pt is resting with eyes closed at this time and is in and out of sleep. Family at bedside. Denies any further needs, call light within reach, will continue to monitor.         Chata Valencia RN  12/23/19 2472

## 2019-12-23 NOTE — ED TRIAGE NOTES
Medication reconciliation incomplete due to patient/family's inability to provide complete list.  Will need follow up.

## 2019-12-23 NOTE — ED NOTES
Bed: 04  Expected date:   Expected time:   Means of arrival:   Comments:  jude Martin, RN  12/23/19 4665

## 2019-12-23 NOTE — ED PROVIDER NOTES
[facility]  ED  EMERGENCY DEPARTMENT ENCOUNTER        Pt Name: Rodolfo Martinez  MRN: [id number]  [place] 1935  Date of evaluation: 12/23/2019  Provider: Della Warren MD  PCP: Isa Juárez MD      CHIEF COMPLAINT       Chief Complaint   Patient presents with    Altered Mental Status     per squad report pt lives at home with her son. they report for the past three to four days pt has been altered. they report hallucinations and insomnia. pt has recently been txt for UTI and on antibiotics.  Tremors       HISTORY OFPRESENT ILLNESS   (Location/Symptom, Timing/Onset, Context/Setting, Quality, Duration, Modifying Factors,Severity)  Note limiting factors. Rodolfo Martinez is a 80 y.o. female presenting today due to concern for altered mental status starting roughly 5 days ago. She lives with her son and reportedly normally gets around with a walker but since 5 days ago has been unable to ambulate. No reported falls. For the last 2 days she has been having hallucinations and has been unable to sleep. She was started on Bactrim 5 days ago due to concern for possible urinary tract infection but no other new medications recently. This is a sudden change mentally for the patient. She has never acted this way before. Due to altered mental status, history and physical are severely limited at this time. No reported fever at home. REVIEW OF SYSTEMS    (2-9 systems for level 4, 10 or more for level 5)     Review of Systems   Unable to perform ROS: Mental status change         Positives and Pertinent negatives as per HPI.       PASTMEDICAL HISTORY     Past Medical History:   Diagnosis Date    Acute bilateral low back pain with bilateral sciatica 6/8/2017    Anxiety     Cancer (HCC)     ovarian cancer    Degeneration of lumbar or lumbosacral intervertebral disc 5/7/2018    Depression     Hypertension     Hypothyroid 2/20/2017    Thyroid disease          SURGICAL HISTORY Past Surgical History:   Procedure Laterality Date    HYSTERECTOMY      THYROIDECTOMY           CURRENT MEDICATIONS       Previous Medications    AMIODARONE (CORDARONE) 200 MG TABLET    TAKE 1 TABLET BY MOUTH ONCE DAILY    ASPIRIN 81 MG EC TABLET    Take 81 mg by mouth    HYDRALAZINE (APRESOLINE) 50 MG TABLET    Take 1 tablet by mouth 3 times daily    LEVOTHYROXINE (SYNTHROID) 100 MCG TABLET    Take 1 tablet by mouth Daily    LOSARTAN (COZAAR) 100 MG TABLET    TAKE 1 TABLET EVERY DAY    METOPROLOL SUCCINATE (TOPROL XL) 100 MG EXTENDED RELEASE TABLET    TAKE 1 TABLET EVERY DAY    MUPIROCIN (BACTROBAN) 2 % OINTMENT    by Intratympanic route    MUPIROCIN (BACTROBAN) 2 % OINTMENT    APPLY OINTMENT TOPICALLY THREE TIMES DAILY    NIFEDIPINE (ADALAT CC) 30 MG EXTENDED RELEASE TABLET    Take 30 mg by mouth    NIFEDIPINE (ADALAT CC) 30 MG EXTENDED RELEASE TABLET    TAKE 1 TABLET BY MOUTH NIGHTLY    OMEPRAZOLE (PRILOSEC) 20 MG DELAYED RELEASE CAPSULE    TAKE 1 CAPSULE EVERY DAY       ALLERGIES     Amlodipine besylate; Demerol hcl [meperidine]; Hydrochlorothiazide; Lisinopril; and Meperidine hcl    FAMILY HISTORY       Family History   Problem Relation Age of Onset    Cancer Brother           SOCIAL HISTORY       Social History     Socioeconomic History    Marital status:       Spouse name: None    Number of children: None    Years of education: None    Highest education level: None   Occupational History    None   Social Needs    Financial resource strain: None    Food insecurity:     Worry: None     Inability: None    Transportation needs:     Medical: None     Non-medical: None   Tobacco Use    Smoking status: Former Smoker    Smokeless tobacco: Never Used   Substance and Sexual Activity    Alcohol use: No    Drug use: No    Sexual activity: None   Lifestyle    Physical activity:     Days per week: None     Minutes per session: None    Stress: None   Relationships    Social connections: Talks on phone: None     Gets together: None     Attends Worship service: None     Active member of club or organization: None     Attends meetings of clubs or organizations: None     Relationship status: None    Intimate partner violence:     Fear of current or ex partner: None     Emotionally abused: None     Physically abused: None     Forced sexual activity: None   Other Topics Concern    None   Social History Narrative    None       SCREENINGS    Karuna Coma Scale  Eye Opening: Spontaneous  Best Verbal Response: Confused  Best Motor Response: Obeys commands  Karuna Coma Scale Score: 14           PHYSICAL EXAM    (up to 7 for level 4, 8 or more for level 5)     ED Triage Vitals [12/23/19 1446]   BP Temp Temp Source Pulse Resp SpO2 Height Weight   (!) 148/73 98.1 °F (36.7 °C) Oral 69 20 98 % 5' 5\" (1.651 m) 131 lb (59.4 kg)       Physical Exam  Vitals signs and nursing note reviewed. Constitutional:       General: She is awake. She is in acute distress (mild). Appearance: She is normal weight. She is ill-appearing. She is not toxic-appearing or diaphoretic. HENT:      Head: Normocephalic and atraumatic. Right Ear: External ear normal.      Left Ear: External ear normal.      Mouth/Throat:      Mouth: Mucous membranes are dry. Eyes:      General:         Right eye: No discharge. Left eye: No discharge. Extraocular Movements: Extraocular movements intact. Conjunctiva/sclera: Conjunctivae normal.      Pupils: Pupils are equal, round, and reactive to light. Neck:      Musculoskeletal: Normal range of motion and neck supple. No neck rigidity or muscular tenderness. Cardiovascular:      Rate and Rhythm: Normal rate and regular rhythm. Pulses: Normal pulses. Pulmonary:      Effort: Pulmonary effort is normal. No respiratory distress. Breath sounds: Normal breath sounds. No stridor. No wheezing, rhonchi or rales. Abdominal:      General: Abdomen is flat.  Bowel sounds are normal. There is no distension. Tenderness: There is no tenderness. There is no guarding or rebound. Musculoskeletal:         General: No swelling, tenderness, deformity or signs of injury. Right lower leg: No edema. Left lower leg: No edema. Comments: Decreased ROM of right knee although nontender to palpation on exam and moving right leg    Skin:     General: Skin is warm and dry. Coloration: Skin is not jaundiced. Findings: Bruising (to anterior legs ) present. No erythema or lesion. Neurological:      Mental Status: She is alert. She is disoriented and confused. GCS: GCS eye subscore is 4. GCS verbal subscore is 3. GCS motor subscore is 6. Cranial Nerves: Dysarthria present. Motor: Weakness (not responding to command to left foot but moving all extremities ) and tremor present. No seizure activity. Comments: Limited due to AMS    Psychiatric:         Behavior: Behavior is uncooperative.       Comments: Reported hallucinations although none witnessed on exam              DIAGNOSTIC RESULTS   :    Labs Reviewed   CBC WITH AUTO DIFFERENTIAL - Abnormal; Notable for the following components:       Result Value    WBC 12.1 (*)     Neutrophils Absolute 9.1 (*)     All other components within normal limits    Narrative:     Performed at:  [place] [address] "[employer]"   Phone [phone]   COMPREHENSIVE METABOLIC PANEL - Abnormal; Notable for the following components:    Sodium 133 (*)     Chloride 97 (*)     Glucose 117 (*)     BUN 37 (*)     CREATININE 1.5 (*)     GFR Non- 33 (*)     GFR  40 (*)     All other components within normal limits    Narrative:     Performed at:  [place] [address] "[employer]"   Phone [phone]   TSH WITH REFLEX - Abnormal; Notable for the following components:    TSH 40.22 (*) All other components within normal limits    Narrative:     Performed at:  [facility]  [address] [facility] [place] [address]   Phone [phone]   TROPONIN - Abnormal; Notable for the following components:    Troponin 0.03 (*)     All other components within normal limits    Narrative:     Performed at:  [address], [address]   Phone [phone]   CK - Abnormal; Notable for the following components: Total  (*)     All other components within normal limits    Narrative:     Performed at:  [address], [address]   Phone [phone]   URINALYSIS    Narrative:     Performed at:  [address], [address]   Phone [phone]   LACTIC ACID, PLASMA    Narrative:     Performed at:  [address], [address]   Phone [phone]   MAGNESIUM    Narrative:     Performed at:  [address], [address]   Phone [phone]   VITAMIN B12 & FOLATE    Narrative:     Performed at:  [facility]  [address] [facility] [address] [place] [address]   Phone [phone]   T4, FREE    Narrative:     Performed at:  [facility]  [address] [facility] [address] [place] [address]   Phone [phone]       All other labs were within normal range or not returned asof this dictation. EKG: All EKG's are interpreted by the Emergency Department Physician who either signs or Co-signs this chart in the absence of a cardiologist.    The Ekg interpreted by me shows  normal sinus rhythm with a rate of 70  Axis is   Normal  QTc is  normal  Intervals and Durations are unremarkable.       ST Segments: no acute change and nonspecific changes  No significant change from prior EKG dated - 11/9/19  No STEMI           RADIOLOGY:   Non-plain film images such as CT, Ultrasound and MRI are read by the radiologist. Jonetta Shadow images are visualized and preliminarily interpreted by the  ED Provider with the belowfindings:        Interpretation per the Radiologist below, if available at the time of this note:    CT Head WO Contrast   Final Result   Technically limited exam without acute intracranial abnormality         CT Cervical Spine WO Contrast   Final Result   No acute abnormality of the cervical spine. XR CHEST PORTABLE   Final Result   Cardiomegaly with no acute finding or significant change. PROCEDURES   Unless otherwise noted below, none     Procedures    CRITICAL CARE TIME   Time: at least 15 minutes   Includes repeat examinations, speaking with consultants, lab interpretation, speaking with family (son for 5 minutes), IV fluids for RICH   Excludes separate billable procedures. Patient at risk for serious decompensation if not treated for this life-threatening condition. CONSULTS: Spoke with Dr. Jacob Galloway at 3189 for admission. IP CONSULT TO HOSPITALIST    EMERGENCY DEPARTMENT COURSE and DIFFERENTIAL DIAGNOSIS/MDM:   Vitals:    Vitals:    12/23/19 1541 12/23/19 1611 12/23/19 1707 12/23/19 1802   BP: 130/72  (!) 136/105 (!) 173/113   Pulse:  68 69 69   Resp: 17 21 14 15   Temp:       TempSrc:       SpO2: 100% 99% 91% 97%   Weight:       Height:           Patient was given the following medications:  Medications   LORazepam (ATIVAN) injection 0.5 mg (0.5 mg Intravenous Given 12/23/19 1559)   0.9 % sodium chloride bolus (0 mLs Intravenous Stopped 12/23/19 1705)     Patient was evaluated due to concern for altered mental status for the last 5 days along with generalized weakness. Lab work was concerning for acute kidney injury and therefore she received IV fluids. The symptoms may be related to dehydration.   There was also concern that her blood pressure had been elevated at home although it was appropriate when I saw her, but there is a chance she had hypertensive encephalopathy related to this. She would benefit from further evaluation in the hospital with neurology consultation and possible MRI to assess for possible stroke. She may also need to see nephrology. She was in no acute distress at time of admission but will need telemetry. No signs of infection at this time. No sign of intracranial hemorrhage per radiologist.      The patient tolerated their visit well. The patient and / or the family were informed of the results of any tests, a time was given to answer questions. FINAL IMPRESSION      1. Acute kidney injury ([facility] [address]    2. Hallucinations    3. Altered mental status, unspecified altered mental status type    4.  Dehydration          DISPOSITION/PLAN   DISPOSITION Admitted 12/23/2019 05:50:01 PM      PATIENT REFERRED TO:  Tessa Nelson MD  [address] [place] [id number]  [phone]            DISCHARGEMEDICATIONS:  New Prescriptions    No medications on file       DISCONTINUED MEDICATIONS:  Discontinued Medications    No medications on file              (Please note that portions of this note were completed with a voicerecognition program.  Efforts were made to edit the dictations but occasionally words are mis-transcribed.)    Paolo Martínez MD (electronically signed)            Paolo Martínez MD  12/23/19 0846

## 2019-12-24 LAB
ALBUMIN SERPL-MCNC: 3.2 G/DL (ref 3.4–5)
ANION GAP SERPL CALCULATED.3IONS-SCNC: 10 MMOL/L (ref 3–16)
BASOPHILS ABSOLUTE: 0 K/UL (ref 0–0.2)
BASOPHILS RELATIVE PERCENT: 0.5 %
BUN BLDV-MCNC: 32 MG/DL (ref 7–20)
CALCIUM SERPL-MCNC: 8.6 MG/DL (ref 8.3–10.6)
CHLORIDE BLD-SCNC: 105 MMOL/L (ref 99–110)
CO2: 19 MMOL/L (ref 21–32)
CORTISOL TOTAL: 20.3 UG/DL
CREAT SERPL-MCNC: 1.1 MG/DL (ref 0.6–1.2)
CREATININE URINE: 111 MG/DL (ref 28–259)
EKG ATRIAL RATE: 70 BPM
EKG DIAGNOSIS: NORMAL
EKG P AXIS: 61 DEGREES
EKG P-R INTERVAL: 160 MS
EKG Q-T INTERVAL: 422 MS
EKG QRS DURATION: 76 MS
EKG QTC CALCULATION (BAZETT): 455 MS
EKG R AXIS: 59 DEGREES
EKG T AXIS: 102 DEGREES
EKG VENTRICULAR RATE: 70 BPM
EOSINOPHILS ABSOLUTE: 0.1 K/UL (ref 0–0.6)
EOSINOPHILS RELATIVE PERCENT: 1.3 %
GFR AFRICAN AMERICAN: 57
GFR NON-AFRICAN AMERICAN: 47
GLUCOSE BLD-MCNC: 141 MG/DL (ref 70–99)
HCT VFR BLD CALC: 33.4 % (ref 36–48)
HEMOGLOBIN: 11.1 G/DL (ref 12–16)
LYMPHOCYTES ABSOLUTE: 1.4 K/UL (ref 1–5.1)
LYMPHOCYTES RELATIVE PERCENT: 15.9 %
MCH RBC QN AUTO: 31.6 PG (ref 26–34)
MCHC RBC AUTO-ENTMCNC: 33.2 G/DL (ref 31–36)
MCV RBC AUTO: 95.1 FL (ref 80–100)
MONOCYTES ABSOLUTE: 0.8 K/UL (ref 0–1.3)
MONOCYTES RELATIVE PERCENT: 8.9 %
NEUTROPHILS ABSOLUTE: 6.5 K/UL (ref 1.7–7.7)
NEUTROPHILS RELATIVE PERCENT: 73.4 %
OSMOLALITY URINE: 639 MOSM/KG (ref 390–1070)
PDW BLD-RTO: 14.6 % (ref 12.4–15.4)
PHOSPHORUS: 3.2 MG/DL (ref 2.5–4.9)
PLATELET # BLD: 288 K/UL (ref 135–450)
PMV BLD AUTO: 7.2 FL (ref 5–10.5)
POTASSIUM SERPL-SCNC: 4.2 MMOL/L (ref 3.5–5.1)
RBC # BLD: 3.51 M/UL (ref 4–5.2)
SODIUM BLD-SCNC: 134 MMOL/L (ref 136–145)
SODIUM URINE: 118 MMOL/L
WBC # BLD: 8.9 K/UL (ref 4–11)

## 2019-12-24 PROCEDURE — 36415 COLL VENOUS BLD VENIPUNCTURE: CPT

## 2019-12-24 PROCEDURE — 92610 EVALUATE SWALLOWING FUNCTION: CPT

## 2019-12-24 PROCEDURE — 83935 ASSAY OF URINE OSMOLALITY: CPT

## 2019-12-24 PROCEDURE — 80069 RENAL FUNCTION PANEL: CPT

## 2019-12-24 PROCEDURE — 82570 ASSAY OF URINE CREATININE: CPT

## 2019-12-24 PROCEDURE — 2580000003 HC RX 258: Performed by: INTERNAL MEDICINE

## 2019-12-24 PROCEDURE — 99223 1ST HOSP IP/OBS HIGH 75: CPT | Performed by: PSYCHIATRY & NEUROLOGY

## 2019-12-24 PROCEDURE — 84300 ASSAY OF URINE SODIUM: CPT

## 2019-12-24 PROCEDURE — 2500000003 HC RX 250 WO HCPCS: Performed by: INTERNAL MEDICINE

## 2019-12-24 PROCEDURE — 93010 ELECTROCARDIOGRAM REPORT: CPT | Performed by: INTERNAL MEDICINE

## 2019-12-24 PROCEDURE — 6360000002 HC RX W HCPCS: Performed by: INTERNAL MEDICINE

## 2019-12-24 PROCEDURE — 85025 COMPLETE CBC W/AUTO DIFF WBC: CPT

## 2019-12-24 PROCEDURE — 1200000000 HC SEMI PRIVATE

## 2019-12-24 PROCEDURE — 82533 TOTAL CORTISOL: CPT

## 2019-12-24 RX ORDER — LEVOTHYROXINE SODIUM ANHYDROUS 100 UG/5ML
50 INJECTION, POWDER, LYOPHILIZED, FOR SOLUTION INTRAVENOUS ONCE
Status: COMPLETED | OUTPATIENT
Start: 2019-12-24 | End: 2019-12-24

## 2019-12-24 RX ORDER — LEVOTHYROXINE SODIUM ANHYDROUS 100 UG/5ML
100 INJECTION, POWDER, LYOPHILIZED, FOR SOLUTION INTRAVENOUS DAILY
Status: DISCONTINUED | OUTPATIENT
Start: 2019-12-25 | End: 2019-12-26

## 2019-12-24 RX ADMIN — HYDRALAZINE HYDROCHLORIDE 5 MG: 20 INJECTION INTRAMUSCULAR; INTRAVENOUS at 21:51

## 2019-12-24 RX ADMIN — DEXTROSE AND SODIUM CHLORIDE: 5; 900 INJECTION, SOLUTION INTRAVENOUS at 07:48

## 2019-12-24 RX ADMIN — HEPARIN SODIUM 5000 UNITS: 5000 INJECTION INTRAVENOUS; SUBCUTANEOUS at 13:49

## 2019-12-24 RX ADMIN — DEXTROSE AND SODIUM CHLORIDE: 5; 900 INJECTION, SOLUTION INTRAVENOUS at 18:17

## 2019-12-24 RX ADMIN — HEPARIN SODIUM 5000 UNITS: 5000 INJECTION INTRAVENOUS; SUBCUTANEOUS at 06:19

## 2019-12-24 RX ADMIN — HEPARIN SODIUM 5000 UNITS: 5000 INJECTION INTRAVENOUS; SUBCUTANEOUS at 21:40

## 2019-12-24 RX ADMIN — LEVOTHYROXINE SODIUM ANHYDROUS 50 MCG: 100 INJECTION, POWDER, LYOPHILIZED, FOR SOLUTION INTRAVENOUS at 13:07

## 2019-12-24 RX ADMIN — LEVOTHYROXINE SODIUM ANHYDROUS 50 MCG: 100 INJECTION, POWDER, LYOPHILIZED, FOR SOLUTION INTRAVENOUS at 02:47

## 2019-12-24 ASSESSMENT — PAIN SCALES - GENERAL
PAINLEVEL_OUTOF10: 0
PAINLEVEL_OUTOF10: 0

## 2019-12-24 NOTE — CONSULTS
In patient Neurology consult        [facility] Neurology      Freya Markham MD      Shanell Benites  1935    Date of Service: 12/24/2019    Referring Physician: Juan Francisco Keller MD    Most of the history was obtained from detailed chart reviewing. The patient is currently confused and unable to provide me with accurate history. Reason for the consult and CC: Acute encephalopathy. HPI:   The patient is a 80y.o.  years old female with history of hypertension, hypothyroid and depression who was admitted to the hospital yesterday with acute encephalopathy. According to ER notes, symptoms started 4 to 5 days ago. Family described gradual confusion at home over the last few days and decreased ambulation. Degree was severe and duration was persistent. Other associated symptoms included insomnia, visual hallucination and some arms tremors. No head trauma, witnessed seizure or fever or chills. No recent change in medications. No relieving or aggravating factors. Family decided to bring her to the ER. Initial work-up revealed hyponatremia with sodium 133 and acute kidney injury with creatinine 1.5. CK was elevated at 500 K. CT of the head showed no acute findings. She was eventually admitted. Patient now is waxing and waning. She can answer questions but seems to be in delirium. She denies any headache, neck pain, visual changes or focal weakness or numbness. No back pain and other review of system was unremarkable.     Family History   Problem Relation Age of Onset    Cancer Brother          Past Medical History:   Diagnosis Date    Acute bilateral low back pain with bilateral sciatica 6/8/2017    Anxiety     Cancer ([address]     ovarian cancer    Degeneration of lumbar or lumbosacral intervertebral disc 5/7/2018    Depression     Hypertension     Hypothyroid 2/20/2017    Thyroid disease      Past Surgical History:   Procedure Laterality Date    HYSTERECTOMY      THYROIDECTOMY pulsation. Mental Status:   AAO times one  Poor attention and concentration. Waxing and waning. Unable to assess recent or remote memory due to confusion  Language: Fluent but with poor comprehension and not following directions  Unable to assess fund of knowledge due to confusion. Cranial Nerves:   II: Visual fields: NT due to confusion. Pupils: equal, round, reactive to light  III,IV,VI: Extra Ocular Movements are intact. No nystagmus  V: Facial sensation : NT due to confusion  VII: Facial strength and movements: intact and symmetric  VIII: Hearing: NT due to confusion  IX: Palate elevation NT due to confusion  XI: Shoulder shrug: NT due to confusion  XII: Tongue movements: NT due to confusion  Musculoskeletal:  The patient can move all 4 extremities. No apparent focal weakness. Tone: Normal tone. No rigidity. UE myoclonus was noted in both arms  Reflexes: Bilateral biceps 2/4, triceps 2/4, brachial radialis 2/4, knee 2/4 and ankle 2/4. Planters: flexor bilaterally. Coordination: NT due to confusion  Sensation: NT due to confusion  Gait/Posture: NT due to confusion    Data:  LABS:   Lab Results   Component Value Date     12/24/2019    K 4.2 12/24/2019    K 3.4 11/09/2019     12/24/2019    CO2 19 12/24/2019    BUN 32 12/24/2019    CREATININE 1.1 12/24/2019    GFRAA 57 12/24/2019    LABGLOM 47 12/24/2019    GLUCOSE 141 12/24/2019    PHOS 3.2 12/24/2019    MG 2.30 12/23/2019    CALCIUM 8.6 12/24/2019     Lab Results   Component Value Date    WBC 8.9 12/24/2019    RBC 3.51 12/24/2019    HGB 11.1 12/24/2019    HCT 33.4 12/24/2019    MCV 95.1 12/24/2019    RDW 14.6 12/24/2019     12/24/2019   No results found for: INR, PROTIME    Neuroimaging were independently reviewed by myself. Reviewed notes from different physicians  Reviewed lab and blood testing    Impression:  Acute encephalopathy, severe. Likely multifactorial metabolic encephalopathy.   Could be related to recent dehydration, acute kidney injury and hypothyroid. TSH is 40. Less likely CNS infection. Acute kidney injury  Hypothyroid, not controlled  Hypertension, not controlled  Acute essential myoclonus likely secondary to uremia. Recommendation:  Continue current supportive measures  Hydration  Follow renal panel, CK and troponin  Aspiration precaution  Speech and swallow evaluation  PT and OT  Telemetry  Blood pressure monitor for now  Adjust Synthroid and follow TFT  Would expect improvement of her mentation with the above supportive measures. If encephalopathy does not improve, may consider MRI brain. No need to start any medication for episodic myoclonus. ISS and check A1c  Will follow      Thank you for referring such patient. If you have any questions regarding my consult note, please don't hesitate to call me. Jessica Lubin MD  [phone]    This dictation was generated by voice recognition computer software.  Although all attempts are made to edit the dictation for accuracy, there may be errors in the  transcription that are not intended

## 2019-12-24 NOTE — PROGRESS NOTES
safe eating environment. Impression  Dysphagia Diagnosis: Mild oral stage dysphagia;Mild pharyngeal stage dysphagia  Dysphagia Outcome Severity Scale: Level 4: Mild moderate dysphagia- Intermittent supervision/cueing. One - two diet consistencies restricted   Pt seen upright in bed with lunch tray, alert and agreeable to evaluation, although fatigued and pleasantly confused. RN OK'd SLP entry and evaluation and at bedside initially. Pt currently on RA. Oral-motor exam grossly unremarkable, she completed timely volitional swallow and weak volitional cough. Pt observed with thin liquids (cup, straw), nectar-thick (tsp, cup, straw), puree, mech soft, and regular solid trials (pt eating grilled cheese upon SLP entry with moderate oral / lingual residue observed). Audible swallow initiation noted with TL trials (cup, straw), suspect rapid spillover BOT. Improved bolus control observed with nectar-thick liquids this date. No overt s/s of aspiration / penetration observed with nectar-thick or solid PO trials. Pt's O2 sats 97-98% throughout, RR 20/min before, during, and after PO trials. Pt benefited from assistance with all PO trials d/t baseline \"jerking\" movements. Recommend downgrade to Dental soft diet with nectar-thick liquids, meds in puree, assist feed. RN aware of recs. ST to continue to follow. Treatment Plan  Requires SLP Intervention: Yes  Duration/Frequency of Treatment: 3x/week for LOS  D/C Recommendations: To be determined  Recommendations: Dysphagia treatment; Therapeutic feeds with SLP only;Assist feed  Therapeutic Interventions: Patient/Family education; Therapeutic PO trials with SLP; Diet tolerance monitoring;Oral care    Compensatory Swallowing Strategies  Compensatory Swallowing Strategies: Small bites/sips;Eat/Feed slowly; Assist feed;Remain upright for 30-45 minutes after meals;Upright as possible for all oral intake    Treatment/Goals  Short-term Goals  Timeframe for Short-term Goals: 3 days (12/27/19)  Long-term Goals  Timeframe for Long-term Goals: 5 days (12/29/19)  Goal 1: The pt will tolerate safest and least restrictive diet without s/s of aspiration. Dysphagia Goals: The patient will tolerate recommended diet without observed clinical signs of aspiration; The patient/caregiver will demonstrate understanding of compensatory strategies for improved swallowing safety. ;The patient will tolerate thin liquids without signs and symptoms of aspiration 10/10 via cup. ;The patient will tolerate regular consistency solids 10/10. General  Chart Reviewed: Yes  Comments: Pt admtited d/t AMS, RICH, and dehydration. Per RN, pt much more alert this date. Behavior/Cognition: Alert; Cooperative;Confused;Pleasant mood; Requires cueing  Respiratory Status: Room air  O2 Device: None (Room air)  Communication Observation: Functional(Pleasantly confused)  Follows Directions: Simple  Dentition: Dentures top;Dentures bottom  Patient Positioning: Upright in bed  Baseline Vocal Quality: Normal  Volitional Cough: Weak  Prior Dysphagia History: No hx of dysphagia per chart review. Consistencies Administered: Reg solid; Dysphagia Pureed (Dysphagia I); Dysphagia Minced and Moist (Dysphagia II); Nectar - cup;Nectar - teaspoon;Nectar - straw; Thin - cup; Thin - straw    Vision/Hearing  Hearing  Hearing: Exceptions to [facility]  Hearing Exceptions: Hard of hearing/hearing concerns    Oral Motor Deficits  Oral/Motor  Oral Motor: Within functional limits    Oral Phase Dysfunction  Oral Phase  Oral Phase: Exceptions  Oral Phase Dysfunction  Suspected Premature Bolus Loss: All  Lingual/Palatal Residue: Reg solid     Indicators of Pharyngeal Phase Dysfunction   Pharyngeal Phase  Pharyngeal Phase: Exceptions  Indicators of Pharyngeal Phase Dysfunction  Decreased Laryngeal Elevation: All  Throat Clearing - Immediate:  Thin - cup(x1)  Pharyngeal Phase   Pharyngeal: Pt's O2 sats 97-98% throughout, RR 20/min before, during, and after PO trials. Audible swallow initiation noted with TL trials (cup, straw), suspect rapid spillover BOT. Prognosis  Prognosis  Prognosis for safe diet advancement: fair  Barriers to reach goals: age;cognitive deficits  Individuals consulted  Consulted and agree with results and recommendations: Patient;RN    Education  Patient Education: Pt educated on reason for referral, role of ST, assessment results and recommendations.   Patient Education Response: Verbalizes understanding;Needs reinforcement  Safety Devices in place: Yes  Type of devices: Left in bed;Call light within reach;Nurse notified    Therapy Time  SLP Individual Minutes  Time In: 7035  Time Out: 2522  Minutes: 20 minutes; dysphagia mary Mary M.S. [address] [facility] [place]  Speech-language pathologist  [phone]

## 2019-12-24 NOTE — PROGRESS NOTES
turgor normal.  No rashes or lesions. Neurologic:  Somnolent  with involuntary tremors noted in upper and lower extremities on the left side. Psychiatric: Unable to assess  Capillary Refill: Brisk,< 3 seconds   Peripheral Pulses: +2 palpable, equal bilaterally       Labs:   Recent Labs     12/23/19  1450 12/24/19  0758   WBC 12.1* 8.9   HGB 12.7 11.1*   HCT 38.1 33.4*    288     Recent Labs     12/23/19  1450 12/24/19  0758   * 134*   K 4.8 4.2   CL 97* 105   CO2 21 19*   BUN 37* 32*   CREATININE 1.5* 1.1   CALCIUM 9.5 8.6   PHOS  --  3.2     Recent Labs     12/23/19  1450   AST 35   ALT 16   BILITOT 0.3   ALKPHOS 112     No results for input(s): INR in the last 72 hours. Recent Labs     12/23/19  1450   CKTOTAL 528*   TROPONINI 0.03*       Urinalysis:    Lab Results   Component Value Date    NITRU Negative 12/23/2019    BLOODU Negative 12/23/2019    SPECGRAV 1.020 12/23/2019    GLUCOSEU Negative 12/23/2019       Radiology:  CT Head WO Contrast   Final Result   Technically limited exam without acute intracranial abnormality         CT Cervical Spine WO Contrast   Final Result   No acute abnormality of the cervical spine. XR CHEST PORTABLE   Final Result   Cardiomegaly with no acute finding or significant change. Active Hospital Problems    Diagnosis Date Noted    Acute metabolic encephalopathy [O27.74] 12/23/2019     Assessment/Plan:  1. Acute  Encephalopathy (POA) -likely metabolic ; multifactorial in etiology -slightly better than on admission  -Due to severe hypothyroidism; RICH and dehydration; drug-induced (recent Bactrim)   -Continue neurochecks every 4 hours; supportive care, fall precautions  -Neurology consultation to assist with management -pending  -Consider MRI brain without contrast if agreeable by neurology. -SLP consulted to assess swallow function and to start diet.     2. RICH with mild rhabdomyolysis (POA) -BUN/creatinine - 37/1.5.   Elevated  -likely due to prerenal etiology acute dehydration  -UA negative; improved with IV hydration ; decrease IV fluids until adequate p.o. Monitor BMP  -Strict I's/O.     3. Severe hypothyroidism with concerns for possible myxedema coma (POA) -patient obtunded on clinical exam; mild hyponatremia 133. -TSH 40.22 on admission normal free T4.   -We will replace with 100 mcg IV levothyroxine (patient takes 100 mcg p.o. at home)   -Check cortisol - pending       4. Involuntary Tremors (POA) - Unclear etiology. CT head; CT spine on admission negative . Likely due to above metabolic issues; anticipate improvement with resolution of underlying metabolic issues.     5. Hypertension -controlled with IV as needed hydralazine and hold home p.o. medications until patient more awake.      DVT Prophylaxis: Lovenox   Diet: DIET GENERAL;  Code Status: Full Code    PT/OT Eval Status: Not yet ordered     Dispo -likely 2 to 3 days pending clinical improvement       The note was completed using Dragon -speech recognition software & EMR  . Every effort was made to ensure accuracy; however, inadvertent computerized transcription errors may be present.     Tapan Castillo MD

## 2019-12-24 NOTE — CONSULTS
Nephrology Consult Note  [email or web address]        Reason for Consult: RICH  Consulting Physician: Dr. Angela Dominguez:      The patient is a 80 y.o. female with significant past medical history of HTN, hypothyroidism and depression who presents with AMS. Most information was obtained from review of EHR since the patient is not able to provide any history. Apparently, the family noted tremors associated with AMS the past 4 days. Patient was having hallucinations. Recently treated with Bactrim for UTI. Worsening symptoms prompted family to bring her in. On admission, her serum creatinine was noted to be 1.5mg/dL. She was started on IVF and were were consulted for further evaluation and management. Past Medical History:        Diagnosis Date    Acute bilateral low back pain with bilateral sciatica 6/8/2017    Anxiety     Cancer (HCC)     ovarian cancer    Degeneration of lumbar or lumbosacral intervertebral disc 5/7/2018    Depression     Hypertension     Hypothyroid 2/20/2017    Thyroid disease        Past Surgical History:        Procedure Laterality Date    HYSTERECTOMY      THYROIDECTOMY         Current Medications:    No current facility-administered medications on file prior to encounter.       Current Outpatient Medications on File Prior to Encounter   Medication Sig Dispense Refill    amiodarone (CORDARONE) 200 MG tablet TAKE 1 TABLET BY MOUTH ONCE DAILY  2    aspirin 81 MG EC tablet Take 81 mg by mouth      mupirocin (BACTROBAN) 2 % ointment by Intratympanic route      mupirocin (BACTROBAN) 2 % ointment APPLY OINTMENT TOPICALLY THREE TIMES DAILY  0    NIFEdipine (ADALAT CC) 30 MG extended release tablet Take 30 mg by mouth      NIFEdipine (ADALAT CC) 30 MG extended release tablet TAKE 1 TABLET BY MOUTH NIGHTLY  11    losartan (COZAAR) 100 MG tablet TAKE 1 TABLET EVERY DAY 90 tablet 1    metoprolol succinate (TOPROL XL) 100 MG extended release tablet TAKE 1 TABLET EVERY DAY 90 tablet 1    omeprazole (PRILOSEC) 20 MG delayed release capsule TAKE 1 CAPSULE EVERY DAY 90 capsule 1    levothyroxine (SYNTHROID) 100 MCG tablet Take 1 tablet by mouth Daily 90 tablet 1    hydrALAZINE (APRESOLINE) 50 MG tablet Take 1 tablet by mouth 3 times daily 270 tablet 3       Allergies:  Amlodipine besylate; Demerol hcl [meperidine]; Hydrochlorothiazide; Lisinopril; and Meperidine hcl    Social History:    Social History     Socioeconomic History    Marital status:       Spouse name: Not on file    Number of children: Not on file    Years of education: Not on file    Highest education level: Not on file   Occupational History    Not on file   Social Needs    Financial resource strain: Not on file    Food insecurity:     Worry: Not on file     Inability: Not on file    Transportation needs:     Medical: Not on file     Non-medical: Not on file   Tobacco Use    Smoking status: Former Smoker    Smokeless tobacco: Never Used   Substance and Sexual Activity    Alcohol use: No    Drug use: No    Sexual activity: Not on file   Lifestyle    Physical activity:     Days per week: Not on file     Minutes per session: Not on file    Stress: Not on file   Relationships    Social connections:     Talks on phone: Not on file     Gets together: Not on file     Attends Scientologist service: Not on file     Active member of club or organization: Not on file     Attends meetings of clubs or organizations: Not on file     Relationship status: Not on file    Intimate partner violence:     Fear of current or ex partner: Not on file     Emotionally abused: Not on file     Physically abused: Not on file     Forced sexual activity: Not on file   Other Topics Concern    Not on file   Social History Narrative    Not on file       Family History:       Problem Relation Age of Onset    Cancer Brother        REVIEW OF SYSTEMS:    Review of Systems   Unable to obtain      PHYSICAL EXAM:    Vitals: BP (!) 157/63   Pulse 66   Temp 98.3 °F (36.8 °C) (Axillary)   Resp 18   Ht 5' 5\" (1.651 m)   Wt 115 lb 15.4 oz (52.6 kg)   SpO2 97%   BMI 19.30 kg/m²   I/O last 3 completed shifts: In: 500 [IV Piggyback:500]  Out: 700 [Urine:700]  I/O this shift:  In: 120 [P.O.:120]  Out: -     Physical Exam:  Physical Exam  Vitals signs reviewed. Constitutional:       General: She is not in acute distress. HENT:      Head: Normocephalic and atraumatic. Mouth/Throat:      Mouth: Mucous membranes are dry. Eyes:      Conjunctiva/sclera: Conjunctivae normal.   Cardiovascular:      Rate and Rhythm: Normal rate. Heart sounds: No friction rub. Pulmonary:      Effort: Pulmonary effort is normal. No respiratory distress. Breath sounds: No wheezing or rales. Abdominal:      General: Bowel sounds are normal. There is no distension. Tenderness: There is no tenderness. Musculoskeletal:      Right lower leg: No edema. Left lower leg: No edema. Neurological:      Mental Status: She is lethargic. DATA:    Recent Labs     12/23/19  1450 12/24/19  0758   WBC 12.1* 8.9   HGB 12.7 11.1*   HCT 38.1 33.4*   MCV 93.7 95.1    288     Recent Labs     12/23/19  1450 12/24/19  0758   * 134*   K 4.8 4.2   CL 97* 105   CO2 21 19*   GLUCOSE 117* 141*   PHOS  --  3.2   MG 2.30  --    BUN 37* 32*   CREATININE 1.5* 1.1   LABGLOM 33* 47*   GFRAA 40* 57*           IMPRESSION/RECOMMENDATIONS:      Acute Kidney Injury.  - Likely pre-renal in etiology. - Urinalysis with no hematuria or proteinuria. - Kidney function improving with IVF hydration. Continue the same.  - Please avoid any nephrotoxic agents such as NSAIDs or IV contrast unless deemed necessary.  - BMP daily. Hyponatremia, chronic.  - In the setting of hypothyroidism. TSH of 40.22.  - Serum sodium at baseline.  - On IV Levothyroxine per primary service. AMS. - CT scan of the head with no acute intracranial abnormality.   - Neurology consulted. Hypertension.  - Unable to take PO medications because of AMS. - On Hydralazine IV PRN. Thank you for allowing me to participate in the care of this patient. Please do not hesitate to contact me at [phone] if with questions.     Kristan Salinas MD  12/24/2019  [facility]

## 2019-12-24 NOTE — PROGRESS NOTES
4 Eyes Skin Assessment     The patient is being assess for   Admission    I agree that 2 RN's have performed a thorough Head to Toe Skin Assessment on the patient. ALL assessment sites listed below have been assessed. Areas assessed by both nurses:   [x]   Head, Face, and Ears   [x]   Shoulders, Back, and Chest, Abdomen  [x]   Arms, Elbows, and Hands   [x]   Coccyx, Sacrum, and Ischium  [x]   Legs, Feet, and Heels        Pt's found to have ecchymotic skin, x2 skin tears on L hand and 1 skin tear on L shin. Pt also has scattered abrasion primarily on the B/L lower extremities. *    **SHARE this note so that the co-signing nurse is able to place an eSignature**    Co-signer eSignature: Electronically signed by Perry Lewis RN on 12/24/19 at 4:43 PM    Does the Patient have Skin Breakdown?   Yes LDA WOUND CARE was Initiated documentation include the Yesenia-wound, Wound Assessment, Measurements, Dressing Treatment, Drainage, and Color\",          Jacques Prevention initiated:  Yes   Wound Care Orders initiated:  NA      [facility] nurse consulted for Pressure Injury (Stage 3,4, Unstageable, DTI, NWPT, Complex wounds)and New or Established Ostomies:  NA      Primary Nurse eSignature: Electronically signed by Susi Franklin RN on 12/24/19 at 3:39 PM

## 2019-12-24 NOTE — CARE COORDINATION
CASE MANAGEMENT INITIAL ASSESSMENT      Reviewed chart and met with patient today, re: DCP needs. Explained Case Management role/services. Family present: Son  Primary contact information: Son     Admit date/status: 12/23/19 IP  Diagnosis: Acute metabolic encephalopathy     Insurance: [employer]  Precert required for SNF - Y       3 night stay required - N    Living arrangements, Adls, care needs, prior to admission: lives with son has help with ADL's. Transportation:Family     Durable Medical Equipment at home: Walker_X_Cane__RTS__ BSCX__Shower ChairX__  02__ HHN__ CPAP__  BiPap__  Hospital Bed__ W/C___ Other__________    Services in the home and/or outpatient, prior to admission: Active with [facility]. PT/OT recs: None Seen at this time. Hospital Exemption Notification (HEN): needed for SNF, not initiated. Barriers to discharge: None    Plan/comments: CM met with pt and son at bedside for initial assessment. Son states that his Mom recently moved in with him this past year and she has progressively gotten harder to care for in the last couple of months. Updated that pt did just have a skilled stay at [facility]. . Provided son with SNF list and will watch for therapy recs.  CM following-Altagracia Wilde RN      ECOC on chart for MD signature

## 2019-12-24 NOTE — H&P
Hospital Medicine History & Physical      PCP: Vita Forrester MD    Date of Admission: 12/23/2019    Date of Service: Pt seen/examined on 12/23/19  and Admitted to Inpatient with expected LOS greater than two midnights due to medical therapy. Chief Complaint   Patient presents with    Altered Mental Status     per squad report pt lives at home with her son. they report for the past three to four days pt has been altered. they report hallucinations and insomnia. pt has recently been txt for UTI and on antibiotics.  Tremors     History Of Present Illness: Patient is currently obtunded and is unable to provide any history. No family at bedside. Most of the history obtained from ED physician report and RN report. 80 y.o. female with PMH as mentioned below brought by family to [facility] ED with c/o altered mental status associated with tremors for the past 4 to 5 days with decreased ambulation. As per ED physician report, patient lives with her son and reportedly gets around with a walker at baseline up until 5 days ago. No reported falls. For the last 2 days, patient has been having hallucinations and unable to sleep. She received Bactrim 5 days ago due to concern for possible UTI but no new medications recently. This is a sudden change in her mentation as per family. She has never acted like this before. No reported fever at home. ED course : Patient hemodynamically stable upon arrival to ED. Labs suggestive of mild hyponatremia sodium 133; RICH with BUN/creatinine 37/1.5. Elevated  and elevated troponin 0 .03 . TSH is severely elevated at 40.22 .  UA negative. Chest x-ray negative. CT C-spine -no acute abnormality; CT head technically limited exam without acute intracranial abnormality. Patient is admitted to the hospital for further evaluation and management    Upon evaluation by me in room 540; patient obtunded with involuntary tremors noted.  D/w RN at bedside regarding plan of care. Full review of systems, physical exam limited due to altered mental status. Past Medical History:      Diagnosis Date    Acute bilateral low back pain with bilateral sciatica 6/8/2017    Anxiety     Cancer (HCC)     ovarian cancer    Degeneration of lumbar or lumbosacral intervertebral disc 5/7/2018    Depression     Hypertension     Hypothyroid 2/20/2017    Thyroid disease        Past Surgical History:        Procedure Laterality Date    HYSTERECTOMY      THYROIDECTOMY         Medications Prior to Admission:    Prior to Admission medications    Medication Sig Start Date End Date Taking? Authorizing Provider   amiodarone (CORDARONE) 200 MG tablet TAKE 1 TABLET BY MOUTH ONCE DAILY 8/24/19   Historical Provider, MD   aspirin 81 MG EC tablet Take 81 mg by mouth    Historical Provider, MD   mupirocin (BACTROBAN) 2 % ointment by Intratympanic route 11/2/19   Historical Provider, MD   mupirocin (BACTROBAN) 2 % ointment APPLY OINTMENT TOPICALLY THREE TIMES DAILY 11/2/19   Historical Provider, MD   NIFEdipine (ADALAT CC) 30 MG extended release tablet Take 30 mg by mouth 10/10/19   Historical Provider, MD   NIFEdipine (ADALAT CC) 30 MG extended release tablet TAKE 1 TABLET BY MOUTH NIGHTLY 9/17/19   Historical Provider, MD   losartan (COZAAR) 100 MG tablet TAKE 1 TABLET EVERY DAY 2/26/18   [facility], DO   metoprolol succinate (TOPROL XL) 100 MG extended release tablet TAKE 1 TABLET EVERY DAY 2/26/18   [facility], DO   omeprazole (PRILOSEC) 20 MG delayed release capsule TAKE 1 CAPSULE EVERY DAY 11/16/17   Heidi Bar Nelson, DO   levothyroxine (SYNTHROID) 100 MCG tablet Take 1 tablet by mouth Daily 4/6/17   [facility], DO   hydrALAZINE (APRESOLINE) 50 MG tablet Take 1 tablet by mouth 3 times daily 4/6/17   [facility], DO       Allergies:  Amlodipine besylate; Demerol hcl [meperidine]; Hydrochlorothiazide;  Lisinopril; and Meperidine hcl    Social History:    The patient currently lives with her son at home and uses a walker and is independent of IADLs    TOBACCO:   reports that she has quit smoking. She has never used smokeless tobacco.  ETOH:   reports no history of alcohol use. Family History:     Reviewed in detail and negative for DM, CAD, Cancer, CVA. Positive as follows:        Problem Relation Age of Onset    Cancer Brother        REVIEW OF SYSTEMS:   Pertinent positives as noted in the HPI. All other systems reviewed and negative. PHYSICAL EXAM PERFORMED:  BP (!) 173/113   Pulse 69   Temp 98.1 °F (36.7 °C) (Oral)   Resp 15   Ht 5' 5\" (1.651 m)   Wt 131 lb (59.4 kg)   SpO2 97%   BMI 21.80 kg/m²     General appearance: Severely obtunded but in no apparent distress, appears stated age and cooperative. HEENT:  Normal cephalic, atraumatic without obvious deformity. Neck: Supple, with full range of motion. No jugular venous distention. Trachea midline. Respiratory:  Normal respiratory effort. Clear to auscultation, bilaterally without Rales/Wheezes/Rhonchi. Diminished breath sounds at bases  Cardiovascular:  Regular rate and rhythm with normal S1/S2 without murmurs, rubs or gallops. Abdomen: Soft, non-tender, non-distended with normal bowel sounds. Musculoskeletal:  No clubbing, cyanosis or edema bilaterally. Full range of motion without deformity. Skin: Skin color, texture, turgor normal.  No rashes or lesions. Neurologic: Obtunded with involuntary tremors noted in upper and lower extremities on the left side.   Psychiatric: Unable to assess  Capillary Refill: Brisk,< 3 seconds   Peripheral Pulses: +2 palpable, equal bilaterally     Labs:   Recent Labs     12/23/19  1450   WBC 12.1*   HGB 12.7   HCT 38.1        Recent Labs     12/23/19  1450   *   K 4.8   CL 97*   CO2 21   BUN 37*   CREATININE 1.5*   CALCIUM 9.5     Recent Labs     12/23/19  1450   AST 35   ALT 16   BILITOT 0.3   ALKPHOS 112     Recent Labs     12/23/19  1450   CKTOTAL 528*   TROPONINI 0.03*       Urinalysis:    Lab Results   Component Value Date    NITRU Negative 12/23/2019    BLOODU Negative 12/23/2019    SPECGRAV 1.020 12/23/2019    GLUCOSEU Negative 12/23/2019       EKG:  I have reviewed the EKG with the following interpretation: Normal sinus rhythm, moderate voltage criteria for LVH, no significant ST-T wave changes    Radiology:    I have reviewed the Imaging  with the following interpretation:   CT Head WO Contrast   Final Result   Technically limited exam without acute intracranial abnormality         CT Cervical Spine WO Contrast   Final Result   No acute abnormality of the cervical spine. XR CHEST PORTABLE   Final Result   Cardiomegaly with no acute finding or significant change. Active Hospital Problems    Diagnosis Date Noted    Acute metabolic encephalopathy [P35.87] 12/23/2019     ASSESSMENT/PLAN:  1. Acute  Encephalopathy (POA) -likely metabolic ; multifactorial in etiology  -Due to severe hypothyroidism; RICH and dehydration; drug-induced (recent Bactrim)   -Continue neurochecks every 4 hours; supportive care, fall precautions  -Neurology consultation to assist with management    2. RICH with mild rhabdomyolysis (POA) -BUN/creatinine - 37/1.5. Elevated  -likely due to prerenal etiology acute dehydration  -UA negative; continue IV hydration and anticipate improvement. Monitor BMP  -Strict I's/O.    3.  Severe hypothyroidism with concerns for possible myxedema coma (POA) -patient obtunded on clinical exam; mild hyponatremia 133. -TSH 40.22 on admission normal free T4.   -We will replace with 50 mcg IV levothyroxine (patient takes 100 mcg p.o. at home)   -Check cortisol     4. Involuntary Tremors (POA) - Unclear etiology. CT head; CT spine on admission negative . Likely due to above metabolic issues; anticipate improvement with resolution of underlying metabolic issues. 5.  Hypertension -controlled with IV as needed hydralazine and hold home p.o. medications until patient more awake. DVT Prophylaxis: Lovenox   Diet: No diet orders on file  Code Status: Prior    PT/OT Eval Status: Not yet ordered     Dispo -anticipate more than 2 midnight stay in the hospital     Cris Gruber MD    The note was completed using Dragon -speech recognition software & EMR  . Every effort was made to ensure accuracy; however, inadvertent computerized transcription errors may be present. Thank you Jennifer Rodriguez MD for the opportunity to be involved in this patient's care. If you have any questions or concerns please feel free to contact me at [phone].

## 2019-12-24 NOTE — PROGRESS NOTES
Thank you for the consult. .  Chart reviewed. Pt seen by [facility] prior. Will direct the consult to them. Thank you for consulting [facility] for the care of your patient. Please do not hesitate to call if you have any questions. Domonique. [facility]. Off: [phone]  [phone] ( after hours)  Cell: [phone].  ( until 5 pm)

## 2019-12-25 LAB
ANION GAP SERPL CALCULATED.3IONS-SCNC: 9 MMOL/L (ref 3–16)
BUN BLDV-MCNC: 22 MG/DL (ref 7–20)
CALCIUM SERPL-MCNC: 8.4 MG/DL (ref 8.3–10.6)
CHLORIDE BLD-SCNC: 103 MMOL/L (ref 99–110)
CO2: 22 MMOL/L (ref 21–32)
CREAT SERPL-MCNC: 0.9 MG/DL (ref 0.6–1.2)
GFR AFRICAN AMERICAN: >60
GFR NON-AFRICAN AMERICAN: 60
GLUCOSE BLD-MCNC: 160 MG/DL (ref 70–99)
HCT VFR BLD CALC: 32.9 % (ref 36–48)
HEMOGLOBIN: 11.2 G/DL (ref 12–16)
MCH RBC QN AUTO: 31.6 PG (ref 26–34)
MCHC RBC AUTO-ENTMCNC: 33.9 G/DL (ref 31–36)
MCV RBC AUTO: 93.4 FL (ref 80–100)
PDW BLD-RTO: 14.7 % (ref 12.4–15.4)
PLATELET # BLD: 310 K/UL (ref 135–450)
PMV BLD AUTO: 7.5 FL (ref 5–10.5)
POTASSIUM SERPL-SCNC: 3.6 MMOL/L (ref 3.5–5.1)
RBC # BLD: 3.53 M/UL (ref 4–5.2)
SODIUM BLD-SCNC: 134 MMOL/L (ref 136–145)
WBC # BLD: 9.1 K/UL (ref 4–11)

## 2019-12-25 PROCEDURE — 6370000000 HC RX 637 (ALT 250 FOR IP): Performed by: NURSE PRACTITIONER

## 2019-12-25 PROCEDURE — 2500000003 HC RX 250 WO HCPCS: Performed by: INTERNAL MEDICINE

## 2019-12-25 PROCEDURE — 2580000003 HC RX 258: Performed by: INTERNAL MEDICINE

## 2019-12-25 PROCEDURE — 6360000002 HC RX W HCPCS: Performed by: INTERNAL MEDICINE

## 2019-12-25 PROCEDURE — 1200000000 HC SEMI PRIVATE

## 2019-12-25 PROCEDURE — 36415 COLL VENOUS BLD VENIPUNCTURE: CPT

## 2019-12-25 PROCEDURE — 85027 COMPLETE CBC AUTOMATED: CPT

## 2019-12-25 PROCEDURE — 6370000000 HC RX 637 (ALT 250 FOR IP): Performed by: INTERNAL MEDICINE

## 2019-12-25 PROCEDURE — 80048 BASIC METABOLIC PNL TOTAL CA: CPT

## 2019-12-25 RX ORDER — ALPRAZOLAM 0.25 MG/1
0.25 TABLET ORAL 2 TIMES DAILY
Status: DISCONTINUED | OUTPATIENT
Start: 2019-12-25 | End: 2019-12-27 | Stop reason: HOSPADM

## 2019-12-25 RX ORDER — ALPRAZOLAM 0.25 MG/1
0.25 TABLET ORAL 2 TIMES DAILY
Status: ON HOLD | COMMUNITY
Start: 2019-10-10 | End: 2019-12-27 | Stop reason: SDUPTHER

## 2019-12-25 RX ORDER — TRAMADOL HYDROCHLORIDE 50 MG/1
50 TABLET ORAL EVERY 6 HOURS PRN
Status: DISCONTINUED | OUTPATIENT
Start: 2019-12-25 | End: 2019-12-27 | Stop reason: HOSPADM

## 2019-12-25 RX ORDER — DOCUSATE SODIUM 100 MG/1
100 CAPSULE, LIQUID FILLED ORAL 2 TIMES DAILY
Status: DISCONTINUED | OUTPATIENT
Start: 2019-12-25 | End: 2019-12-27 | Stop reason: HOSPADM

## 2019-12-25 RX ADMIN — HEPARIN SODIUM 5000 UNITS: 5000 INJECTION INTRAVENOUS; SUBCUTANEOUS at 06:38

## 2019-12-25 RX ADMIN — DOCUSATE SODIUM 100 MG: 100 CAPSULE, LIQUID FILLED ORAL at 22:11

## 2019-12-25 RX ADMIN — HEPARIN SODIUM 5000 UNITS: 5000 INJECTION INTRAVENOUS; SUBCUTANEOUS at 22:11

## 2019-12-25 RX ADMIN — DEXTROSE AND SODIUM CHLORIDE: 5; 900 INJECTION, SOLUTION INTRAVENOUS at 07:25

## 2019-12-25 RX ADMIN — HEPARIN SODIUM 5000 UNITS: 5000 INJECTION INTRAVENOUS; SUBCUTANEOUS at 13:16

## 2019-12-25 RX ADMIN — TRAMADOL HYDROCHLORIDE 50 MG: 50 TABLET, FILM COATED ORAL at 22:11

## 2019-12-25 RX ADMIN — DOCUSATE SODIUM 100 MG: 100 CAPSULE, LIQUID FILLED ORAL at 13:16

## 2019-12-25 RX ADMIN — DEXTROSE AND SODIUM CHLORIDE: 5; 900 INJECTION, SOLUTION INTRAVENOUS at 23:39

## 2019-12-25 RX ADMIN — ALPRAZOLAM 0.25 MG: 0.25 TABLET ORAL at 23:39

## 2019-12-25 RX ADMIN — LEVOTHYROXINE SODIUM ANHYDROUS 100 MCG: 100 INJECTION, POWDER, LYOPHILIZED, FOR SOLUTION INTRAVENOUS at 06:38

## 2019-12-25 RX ADMIN — HYDRALAZINE HYDROCHLORIDE 5 MG: 20 INJECTION INTRAMUSCULAR; INTRAVENOUS at 07:23

## 2019-12-25 RX ADMIN — HYDRALAZINE HYDROCHLORIDE 5 MG: 20 INJECTION INTRAMUSCULAR; INTRAVENOUS at 17:11

## 2019-12-25 ASSESSMENT — PAIN SCALES - GENERAL
PAINLEVEL_OUTOF10: 0
PAINLEVEL_OUTOF10: 7
PAINLEVEL_OUTOF10: 0

## 2019-12-25 NOTE — PROGRESS NOTES
[facility]       Progress Note    Sub/interval history  Still feels nauseated and has no appetite , no BM    Last 24 h uop 900 ml      ROS: No chest pain/shortness of breath  PSFH: No visitor    Scheduled Meds:   levothyroxine  100 mcg Intravenous Daily    sodium chloride flush  10 mL Intravenous 2 times per day    heparin (porcine)  5,000 Units Subcutaneous 3 times per day     Continuous Infusions:   dextrose 5 % and 0.9 % NaCl 75 mL/hr at 12/25/19 0725     PRN Meds:.sodium chloride flush, ondansetron, hydrALAZINE    Objective/     Vitals:    12/24/19 2145 12/25/19 0435 12/25/19 0719 12/25/19 0815   BP: (!) 176/69 (!) 163/69 (!) 173/74 131/65   Pulse: 77 79 76    Resp: 20 20 16    Temp: 99.2 °F (37.3 °C) 98.2 °F (36.8 °C) 98.2 °F (36.8 °C)    TempSrc: Oral Oral Oral    SpO2:  97%     Weight:       Height:         24HR INTAKE/OUTPUT:      Intake/Output Summary (Last 24 hours) at 12/25/2019 1034  Last data filed at 12/25/2019 0513  Gross per 24 hour   Intake 1180 ml   Output 900 ml   Net 280 ml     Constitutional:  Alert, awake, no apparent distress  Cardiovascular:  S1, S2 without m/r/g  Respiratory:  CTA B without w/r/r  Abdomen: +bs, soft, nt  Ext: no LE edema    Data/  Recent Labs     12/23/19  1450 12/24/19  0758 12/25/19  0532   WBC 12.1* 8.9 9.1   HGB 12.7 11.1* 11.2*   HCT 38.1 33.4* 32.9*   MCV 93.7 95.1 93.4    288 310     Recent Labs     12/23/19  1450 12/24/19  0758 12/25/19  0532   * 134* 134*   K 4.8 4.2 3.6   CL 97* 105 103   CO2 21 19* 22   GLUCOSE 117* 141* 160*   PHOS  --  3.2  --    MG 2.30  --   --    BUN 37* 32* 22*   CREATININE 1.5* 1.1 0.9   LABGLOM 33* 47* 60*   GFRAA 40* 57* >60     IMPRESSION/RECOMMENDATIONS:       Acute Kidney Injury.  - Likely pre-renal in etiology. - Urinalysis with no hematuria or proteinuria. - Kidney function back to normal , decrease IVF and can stop after she eats well.   - Please avoid any nephrotoxic agents such as NSAIDs or IV contrast unless deemed necessary.  - BMP daily.     Hyponatremia, chronic.  - In the setting of hypothyroidism. TSH of 40.22.  - Serum sodium at baseline.  - On IV Levothyroxine per primary service.     AMS. - CT scan of the head with no acute intracranial abnormality.  - Neurology consulted.     Hypertension.  - Unable to take PO medications because of AMS. - On Hydralazine IV PRN.     Diego Castaneda MD  [facility]  Office: [phone]  Fax:    [phone]

## 2019-12-25 NOTE — PROGRESS NOTES
Hospitalist Progress Note      PCP: Oneyda Roger MD    Date of Admission: 12/23/2019    Chief Complaint: AMS and Tremors     Hospital Course: Reviewed H&P     Subjective:   On phone, sitting up in bed. Feeling better, AOx3    Medications:  Reviewed    Infusion Medications    dextrose 5 % and 0.9 % NaCl 75 mL/hr at 12/25/19 0725     Scheduled Medications    levothyroxine  100 mcg Intravenous Daily    sodium chloride flush  10 mL Intravenous 2 times per day    heparin (porcine)  5,000 Units Subcutaneous 3 times per day     PRN Meds: sodium chloride flush, ondansetron, hydrALAZINE      Intake/Output Summary (Last 24 hours) at 12/25/2019 1022  Last data filed at 12/25/2019 0513  Gross per 24 hour   Intake 1180 ml   Output 900 ml   Net 280 ml       Physical Exam Performed:  /65   Pulse 76   Temp 98.2 °F (36.8 °C) (Oral)   Resp 16   Ht 5' 5\" (1.651 m)   Wt 115 lb 15.4 oz (52.6 kg)   SpO2 97%   BMI 19.30 kg/m²     General appearance:  Somnolent but easily arousable and in no apparent distress, appears stated age and cooperative. HEENT:  Normal cephalic, atraumatic without obvious deformity. Neck: Supple, with full range of motion. No jugular venous distention. Trachea midline. Respiratory:  Normal respiratory effort. Clear to auscultation, bilaterally without Rales/Wheezes/Rhonchi. Diminished breath sounds at bases  Cardiovascular:  Regular rate and rhythm with normal S1/S2 without murmurs, rubs or gallops. Abdomen: Soft, non-tender, non-distended with normal bowel sounds. Musculoskeletal:  No clubbing, cyanosis or edema bilaterally. Full range of motion without deformity. Skin: Skin color, texture, turgor normal.  No rashes or lesions. Neurologic:  Somnolent  with involuntary tremors noted in upper and lower extremities on the left side.   Psychiatric: Unable to assess  Capillary Refill: Brisk,< 3 seconds   Peripheral Pulses: +2 palpable, equal bilaterally       Labs:   Recent Labs 12/23/19  1450 12/24/19  0758 12/25/19  0532   WBC 12.1* 8.9 9.1   HGB 12.7 11.1* 11.2*   HCT 38.1 33.4* 32.9*    288 310     Recent Labs     12/23/19  1450 12/24/19  0758 12/25/19  0532   * 134* 134*   K 4.8 4.2 3.6   CL 97* 105 103   CO2 21 19* 22   BUN 37* 32* 22*   CREATININE 1.5* 1.1 0.9   CALCIUM 9.5 8.6 8.4   PHOS  --  3.2  --      Recent Labs     12/23/19  1450   AST 35   ALT 16   BILITOT 0.3   ALKPHOS 112     No results for input(s): INR in the last 72 hours. Recent Labs     12/23/19  1450   CKTOTAL 528*   TROPONINI 0.03*       Urinalysis:    Lab Results   Component Value Date    NITRU Negative 12/23/2019    BLOODU Negative 12/23/2019    SPECGRAV 1.020 12/23/2019    GLUCOSEU Negative 12/23/2019       Radiology:  CT Head WO Contrast   Final Result   Technically limited exam without acute intracranial abnormality         CT Cervical Spine WO Contrast   Final Result   No acute abnormality of the cervical spine. XR CHEST PORTABLE   Final Result   Cardiomegaly with no acute finding or significant change. Active Hospital Problems    Diagnosis Date Noted    Acute [facility] ([facility] [address] [N17.9]     Hallucinations [R44.3]     Myoclonus [G25.3]     Acute metabolic encephalopathy [C59.63] 12/23/2019     Assessment/Plan:  1. Acute  Encephalopathy (POA) -likely metabolic ; multifactorial in etiology -improving since admission  -Due to severe hypothyroidism; RICH and dehydration; drug-induced (recent Bactrim)   -Continue neurochecks every 4 hours; supportive care, fall precautions  -Neurology consultation to assist with management -pending  -Consider MRI brain without contrast if agreeable by neurology. -SLP consulted to assess swallow function and to start diet.     2. RICH with mild rhabdomyolysis (POA) -BUN/creatinine - 37/1.5. Elevated  -likely due to prerenal etiology acute dehydration  -UA negative; improved with IV hydration ; decrease IV fluids until adequate p.o.   Monitor

## 2019-12-26 LAB
ANION GAP SERPL CALCULATED.3IONS-SCNC: 9 MMOL/L (ref 3–16)
BUN BLDV-MCNC: 20 MG/DL (ref 7–20)
CALCIUM SERPL-MCNC: 8.4 MG/DL (ref 8.3–10.6)
CHLORIDE BLD-SCNC: 103 MMOL/L (ref 99–110)
CO2: 20 MMOL/L (ref 21–32)
CREAT SERPL-MCNC: 0.7 MG/DL (ref 0.6–1.2)
GFR AFRICAN AMERICAN: >60
GFR NON-AFRICAN AMERICAN: >60
GLUCOSE BLD-MCNC: 127 MG/DL (ref 70–99)
HCT VFR BLD CALC: 32.3 % (ref 36–48)
HEMOGLOBIN: 10.8 G/DL (ref 12–16)
MCH RBC QN AUTO: 31.8 PG (ref 26–34)
MCHC RBC AUTO-ENTMCNC: 33.5 G/DL (ref 31–36)
MCV RBC AUTO: 94.9 FL (ref 80–100)
PDW BLD-RTO: 15.1 % (ref 12.4–15.4)
PLATELET # BLD: 285 K/UL (ref 135–450)
PMV BLD AUTO: 7.4 FL (ref 5–10.5)
POTASSIUM SERPL-SCNC: 3.9 MMOL/L (ref 3.5–5.1)
RBC # BLD: 3.41 M/UL (ref 4–5.2)
SODIUM BLD-SCNC: 132 MMOL/L (ref 136–145)
WBC # BLD: 7.5 K/UL (ref 4–11)

## 2019-12-26 PROCEDURE — 36415 COLL VENOUS BLD VENIPUNCTURE: CPT

## 2019-12-26 PROCEDURE — 2580000003 HC RX 258: Performed by: INTERNAL MEDICINE

## 2019-12-26 PROCEDURE — 97162 PT EVAL MOD COMPLEX 30 MIN: CPT

## 2019-12-26 PROCEDURE — 6370000000 HC RX 637 (ALT 250 FOR IP): Performed by: INTERNAL MEDICINE

## 2019-12-26 PROCEDURE — 6360000002 HC RX W HCPCS: Performed by: INTERNAL MEDICINE

## 2019-12-26 PROCEDURE — 97110 THERAPEUTIC EXERCISES: CPT

## 2019-12-26 PROCEDURE — 97535 SELF CARE MNGMENT TRAINING: CPT

## 2019-12-26 PROCEDURE — 99232 SBSQ HOSP IP/OBS MODERATE 35: CPT | Performed by: PSYCHIATRY & NEUROLOGY

## 2019-12-26 PROCEDURE — 85027 COMPLETE CBC AUTOMATED: CPT

## 2019-12-26 PROCEDURE — 6370000000 HC RX 637 (ALT 250 FOR IP): Performed by: NURSE PRACTITIONER

## 2019-12-26 PROCEDURE — 92526 ORAL FUNCTION THERAPY: CPT

## 2019-12-26 PROCEDURE — 80048 BASIC METABOLIC PNL TOTAL CA: CPT

## 2019-12-26 PROCEDURE — 97166 OT EVAL MOD COMPLEX 45 MIN: CPT

## 2019-12-26 PROCEDURE — 2500000003 HC RX 250 WO HCPCS: Performed by: INTERNAL MEDICINE

## 2019-12-26 PROCEDURE — 1200000000 HC SEMI PRIVATE

## 2019-12-26 RX ORDER — LEVOTHYROXINE SODIUM 0.1 MG/1
100 TABLET ORAL DAILY
Status: DISCONTINUED | OUTPATIENT
Start: 2019-12-26 | End: 2019-12-27 | Stop reason: HOSPADM

## 2019-12-26 RX ORDER — LOSARTAN POTASSIUM 100 MG/1
100 TABLET ORAL DAILY
Status: DISCONTINUED | OUTPATIENT
Start: 2019-12-26 | End: 2019-12-27 | Stop reason: HOSPADM

## 2019-12-26 RX ORDER — AMIODARONE HYDROCHLORIDE 200 MG/1
200 TABLET ORAL DAILY
Status: DISCONTINUED | OUTPATIENT
Start: 2019-12-26 | End: 2019-12-27 | Stop reason: HOSPADM

## 2019-12-26 RX ORDER — ASPIRIN 81 MG/1
81 TABLET ORAL DAILY
Status: DISCONTINUED | OUTPATIENT
Start: 2019-12-26 | End: 2019-12-27 | Stop reason: HOSPADM

## 2019-12-26 RX ORDER — HYDRALAZINE HYDROCHLORIDE 25 MG/1
25 TABLET, FILM COATED ORAL EVERY 8 HOURS SCHEDULED
Status: DISCONTINUED | OUTPATIENT
Start: 2019-12-26 | End: 2019-12-27 | Stop reason: HOSPADM

## 2019-12-26 RX ORDER — HYDRALAZINE HYDROCHLORIDE 25 MG/1
50 TABLET, FILM COATED ORAL 3 TIMES DAILY
Status: DISCONTINUED | OUTPATIENT
Start: 2019-12-26 | End: 2019-12-27

## 2019-12-26 RX ORDER — METOPROLOL SUCCINATE 50 MG/1
100 TABLET, EXTENDED RELEASE ORAL DAILY
Status: DISCONTINUED | OUTPATIENT
Start: 2019-12-26 | End: 2019-12-27 | Stop reason: HOSPADM

## 2019-12-26 RX ADMIN — DOCUSATE SODIUM 100 MG: 100 CAPSULE, LIQUID FILLED ORAL at 20:42

## 2019-12-26 RX ADMIN — HEPARIN SODIUM 5000 UNITS: 5000 INJECTION INTRAVENOUS; SUBCUTANEOUS at 07:16

## 2019-12-26 RX ADMIN — DOCUSATE SODIUM 100 MG: 100 CAPSULE, LIQUID FILLED ORAL at 11:19

## 2019-12-26 RX ADMIN — ALPRAZOLAM 0.25 MG: 0.25 TABLET ORAL at 11:19

## 2019-12-26 RX ADMIN — HYDRALAZINE HYDROCHLORIDE 50 MG: 25 TABLET, FILM COATED ORAL at 20:43

## 2019-12-26 RX ADMIN — ASPIRIN 81 MG: 81 TABLET, COATED ORAL at 13:17

## 2019-12-26 RX ADMIN — Medication 10 ML: at 20:44

## 2019-12-26 RX ADMIN — AMIODARONE HYDROCHLORIDE 200 MG: 200 TABLET ORAL at 13:17

## 2019-12-26 RX ADMIN — ALPRAZOLAM 0.25 MG: 0.25 TABLET ORAL at 20:42

## 2019-12-26 RX ADMIN — LEVOTHYROXINE SODIUM ANHYDROUS 100 MCG: 100 INJECTION, POWDER, LYOPHILIZED, FOR SOLUTION INTRAVENOUS at 07:17

## 2019-12-26 RX ADMIN — HEPARIN SODIUM 5000 UNITS: 5000 INJECTION INTRAVENOUS; SUBCUTANEOUS at 13:17

## 2019-12-26 RX ADMIN — METOPROLOL SUCCINATE 100 MG: 50 TABLET, EXTENDED RELEASE ORAL at 13:17

## 2019-12-26 RX ADMIN — TRAMADOL HYDROCHLORIDE 50 MG: 50 TABLET, FILM COATED ORAL at 22:47

## 2019-12-26 RX ADMIN — HEPARIN SODIUM 5000 UNITS: 5000 INJECTION INTRAVENOUS; SUBCUTANEOUS at 22:50

## 2019-12-26 RX ADMIN — LEVOTHYROXINE SODIUM 100 MCG: 100 TABLET ORAL at 13:17

## 2019-12-26 RX ADMIN — LOSARTAN POTASSIUM 100 MG: 100 TABLET, FILM COATED ORAL at 13:17

## 2019-12-26 RX ADMIN — HYDRALAZINE HYDROCHLORIDE 25 MG: 25 TABLET, FILM COATED ORAL at 13:17

## 2019-12-26 ASSESSMENT — PAIN SCALES - GENERAL
PAINLEVEL_OUTOF10: 7
PAINLEVEL_OUTOF10: 0
PAINLEVEL_OUTOF10: 7

## 2019-12-26 ASSESSMENT — PAIN DESCRIPTION - LOCATION: LOCATION: BACK

## 2019-12-26 ASSESSMENT — PAIN DESCRIPTION - PAIN TYPE: TYPE: CHRONIC PAIN

## 2019-12-26 NOTE — PROGRESS NOTES
Physical Therapy    Facility/Department: [facility] C5 - MED SURG/ORTHO  Initial Assessment    NAME: Dede Upton  : 1935  MRN: [id number]    Date of Service: 2019    Discharge Recommendations:  Subacute/Skilled Nursing Facility   PT Equipment Recommendations  Equipment Needed: No  Other: defer to SNF    Assessment   Body structures, Functions, Activity limitations: Decreased functional mobility ; Decreased balance  Assessment: Pt functioning below baseline needing additional assist with transfers and ambulation. Posterior LOB upon standing and with ambulation needing Ax2 for safety and frequent cues to walker management during short ambulation. Pt positioned in the chair at end of session. Pt would benefit from skilled Pt to address above deficits to improve functional and safe mobility. recommend SNF upon DC  Treatment Diagnosis: decreased mobility   Prognosis: Good  Decision Making: Medium Complexity  PT Education: Goals;PT Role;Plan of Care;Precautions;Transfer Training;General Safety; Family Education  Barriers to Learning: memory  REQUIRES PT FOLLOW UP: Yes  Activity Tolerance  Activity Tolerance: Patient Tolerated treatment well;Patient limited by fatigue       Patient Diagnosis(es): The primary encounter diagnosis was Acute kidney injury ([address] Diagnoses of Hallucinations, Altered mental status, unspecified altered mental status type, and Dehydration were also pertinent to this visit. has a past medical history of Acute bilateral low back pain with bilateral sciatica, Acute kidney injury ([address] Anxiety, Cancer ([address] Degeneration of lumbar or lumbosacral intervertebral disc, Depression, Hypertension, Hypothyroid, and Thyroid disease. has a past surgical history that includes Hysterectomy and Thyroidectomy.     Restrictions  Restrictions/Precautions  Restrictions/Precautions: Fall Risk  Position Activity Restriction  Other position/activity restrictions: up with assist  Vision/Hearing  Vision: Impaired  Vision Exceptions: Wears glasses for reading  Hearing: Exceptions to [facility]  Hearing Exceptions: Hard of hearing/hearing concerns;Bilateral hearing aid     Subjective  General  Chart Reviewed: Yes  Patient assessed for rehabilitation services?: Yes  Response To Previous Treatment: Not applicable  Family / Caregiver Present: No  Referring Practitioner: SANYA Jovel CNP  Referral Date : 12/25/19  Diagnosis: acute metabolic encephalopathy  Follows Commands: Within Functional Limits  General Comment  Comments: cleared by nursing  Subjective  Subjective: Pt resting in bed.  Pain in B feet from neuropathy  Pain Screening  Patient Currently in Pain: Denies  Vital Signs  Patient Currently in Pain: Denies  Pre Treatment Pain Screening  Intervention List: Patient able to continue with treatment;Patient declined any intervention    Orientation  Orientation  Overall Orientation Status: Within Functional Limits  Social/Functional History  Social/Functional History  Lives With: Son  Type of Home: House  Home Layout: One level  Home Access: Stairs to enter without rails  Entrance Stairs - Number of Steps: 2-3  Bathroom Shower/Tub: Tub/Shower unit  Bathroom Toilet: Standard  Bathroom Equipment: Shower chair, Toilet raiser  Home Equipment: 4 wheeled walker, [employer]  ADL Assistance: Independent  Homemaking Responsibilities: No(son manages household chores)  Ambulation Assistance: Independent(with [address])  Active : No  Occupation: Retired  Cognition        Objective     Observation/Palpation  Posture: Fair    PROM RLE (degrees)  RLE PROM: WFL  AROM RLE (degrees)  RLE AROM: WFL  PROM LLE (degrees)  LLE PROM: WFL  AROM LLE (degrees)  LLE AROM : WFL  Strength RLE  Strength RLE: WFL  Comment: 4/5  Strength LLE  Strength LLE: WFL  Comment: 4/5  Tone RLE  RLE Tone: Normotonic  Tone LLE  LLE Tone: Normotonic  Sensation  Overall Sensation Status: Impaired(decreased sensation in B feet)  Bed mobility  Supine to Sit: Dependent/Total(mod x 2)  Sit to Supine: Unable to assess  Transfers  Sit to Stand: 2 Person Assistance(mod x 2)  Stand to sit: 2 Person Assistance(poor control to sit)  Ambulation  Ambulation?: Yes  More Ambulation?: No  Ambulation 1  Surface: level tile  Device: Rolling Walker  Assistance: Dependent/Total;2 Person assistance(mod x 2)  Quality of Gait: posterior lean needing assist for balance. Step to pattern leading with the L   Distance: 12'   Stairs/Curb  Stairs?: No     Balance  Posture: Fair  Sitting - Static: Fair  Sitting - Dynamic: Fair  Standing - Static: Poor  Standing - Dynamic: Poor        Plan   Plan  Times per week: 3-5x/week   Times per day: Daily  Current Treatment Recommendations: Strengthening, Balance Training, Functional Mobility Training, Endurance Training, Transfer Training, Positioning, Home Exercise Program, Gait Training  Safety Devices  Type of devices:  All fall risk precautions in place, Call light within reach, Chair alarm in place, Patient at risk for falls, Nurse notified, Gait belt, Left in chair, Telesitter in use  Restraints  Initially in place: No    AM-PAC Score  AM-PAC Inpatient Mobility Raw Score : 10 (12/26/19 1207)  AM-PAC Inpatient T-Scale Score : 32.29 (12/26/19 1207)  Mobility Inpatient CMS 0-100% Score: 76.75 (12/26/19 1207)  Mobility Inpatient CMS G-Code Modifier : CL (12/26/19 1207)          Goals  Short term goals  Time Frame for Short term goals: 12/31  Short term goal 1: Pt will complete all transfers with supervision  Short term goal 2: Pt will ambulate x 500' with RW with supervision   Short term goal 3: Pt will complete B LE exercises to improve mobility by 12/ 27  Patient Goals   Patient goals : to get better       Therapy Time   Individual Concurrent Group Co-treatment   Time In 1046         Time Out 1114         Minutes 28         Timed Code Treatment Minutes: [address], PT

## 2019-12-26 NOTE — PROGRESS NOTES
Speech Language Pathology  Facility/Department: [facility] C5 - MED SURG/ORTHO  Dysphagia Daily Treatment Note    NAME: Vicky San  : 1935  MRN: [id number]    Patient Diagnosis(es):   Patient Active Problem List    Diagnosis Date Noted    Acute kidney injury ([facility] [address]     Hallucinations     Myoclonus     Acute metabolic encephalopathy     SOB (shortness of breath) 2019    Fall at home, initial encounter 2019    Chronic abdominal pain     Functional diarrhea 2019    Displacement of lumbar intervertebral disc without myelopathy 2018    Degeneration of lumbar or lumbosacral intervertebral disc 2018    Lumbosacral spondylosis without myelopathy 2018    Infective urethritis 2017    Abdominal bloating 2017    Acute cystitis with hematuria 2017    Thoracic compression fracture ([facility] [address] 06/15/2017    Urinary tract infection without hematuria 06/15/2017    Constipation 2017    Acute bilateral low back pain with bilateral sciatica 2017    Chronic right shoulder pain 2017    Left foot pain 2017    Postoperative hypothyroidism 2017    Chest pain 2017    Hypothyroid 2017    Hyponatremia 2017    Ovarian cancer ([facility] [address] 2017    Anxiety 2016    Cystitis 2016    Grief reaction 2016    Essential hypertension 2016    Hematuria 2016     Allergies: Allergies   Allergen Reactions    Amlodipine Besylate      edema    Demerol Hcl [Meperidine] Other (See Comments)     hallucinations    Hydrochlorothiazide Other (See Comments)     hyponatremia    Lisinopril Other (See Comments)     Other reaction(s): Cough    Meperidine Hcl      unknown     Subjective: 80year old female, sitting up in bed upon SLP entering room. Pt agreeable to follow-up.     Pain: denies    Current Diet: DIET GENERAL;    Diet Tolerance:  Patient tolerating current diet level without signs/symptoms of penetration / aspiration. P.O. Trials: Thin   x Cup x 8, straw x 10   Solid   x X 8     Dysphagia Treatment and Impressions:  RN okays SLP entry into pt's room. RR 16/min on RA prior to po trials. O2 sat 98%. Oral phase of swallow grossly appears WNL. No oral phase deficits noted during evaluation. Pharyngeal phase of swallow appears grossly WNL. No pharyngeal deficits noted during evaluation. Laryngeal elevation appears WFL based upon palpation of anterior neck during swallow. Vocal quality dry before and after po trials. O2 sat 98-99% before and after po trials. RR 16/min following po trials. Dysphagia Goals:  Timeframe for Long-term Goals: 5 days (12/29/19)  1. The pt will tolerate safest and least restrictive diet without s/s of aspiration. Today, 12/26: Progressing. Ongoing       Short-term Goals  Timeframe for Short-term Goals: 3 days (12/27/19)  1. The patient will tolerate recommended diet without observed clinical signs of aspiration, Today, 12/26: Progressing. Ongoing  2. The patient/caregiver will demonstrate understanding of compensatory strategies for improved swallowing safety. , Today, 12/26: Progressing. Ongoing  3. The patient will tolerate thin liquids without signs and symptoms of aspiration 10/10 via cup., Today, 12/26: Progressing. SLP to advance to thin liquids. Ongoing  4. The patient will tolerate regular consistency solids 10/10. Today, 12/26: Progressing. SLP to advance to regular solids. Ongoing      Recommendations:  Solid Consistency: advance to REGULAR  Liquid Consistency: advance to THIN  Medication: with water    Patient/Family/Caregiver Education: reviewed recommendations and aspiration precautions    Compensatory Strategies: HOB 90* and 30\" after meals; small bites/sips; alternate solids/liquids every 3-5 bites; oral care after every meal    Plan:    Continued Dysphagia treatment with goals per plan of care.     Discharge Recommendations: to be determined    If pt discharges from hospital prior to Speech/Swallowing discharge, this note serves as tx and discharge summary. Total Treatment Time / Charges     Time in Time out Total Time / units   Cognitive Tx         Speech Tx      Dysphagia Tx  1605 1615  10 min / 1 unit     Signature:  Angelia Severino, [address] [place] [id number]  Speech-Language Pathologist  (desk #): [phone]

## 2019-12-26 NOTE — CARE COORDINATION
Payal spoke with son Omar Martinez on this day who would like for his mother to go to [facility] at discharge and would like to pay for 10 days of therapy to prepare his home for his mother to come there. Payal initially informed son of the possibility for transportation arrangements, however did not educate him on the potential cost.     Payal initiated referral by calling and faxing. Payal messaged NP who informed Sw pt will be leaving 12/27. Payal will follow and assist as able.

## 2019-12-26 NOTE — PROGRESS NOTES
Sacha Holiday  Neurology Follow-up  [facility]    Date of Service: 12/26/2019    Subjective:   CC: Follow up today regarding: Acute encephalopathy and myoclonus. Events noted. Chart and lab reviewed. The patient denies any new symptoms today. She feels back to her baseline. No headache, weakness or numbness or tingling. No chest pain, dysphagia or dysarthria. No more tremors or myoclonus. Kidney function test is back to normal.  Other review of system was unremarkable. ROS : A 10-12 system review obtained and updated today and is unremarkable except as mentioned  in my interval history. family history includes Cancer in her brother.     Past Medical History:   Diagnosis Date    Acute bilateral low back pain with bilateral sciatica 6/8/2017    Acute kidney injury ([facility] [address]     Anxiety     Cancer (HCC)     ovarian cancer    Degeneration of lumbar or lumbosacral intervertebral disc 5/7/2018    Depression     Hypertension     Hypothyroid 2/20/2017    Thyroid disease      Current Facility-Administered Medications   Medication Dose Route Frequency Provider Last Rate Last Dose    hydrALAZINE (APRESOLINE) tablet 25 mg  25 mg Oral 3 times per day Lupe Nur MD   25 mg at 12/26/19 1317    amiodarone (CORDARONE) tablet 200 mg  998 mg Oral Daily Defaith , APRN - CNP   200 mg at 12/26/19 1317    aspirin EC tablet 81 mg  81 mg Oral Daily Defaith , APRN - CNP   81 mg at 12/26/19 1317    hydrALAZINE (APRESOLINE) tablet 50 mg  50 mg Oral TID Jonna Wiggins, APRN - CNP        levothyroxine (SYNTHROID) tablet 100 mcg  100 mcg Oral Daily Jonna , APRN - CNP   100 mcg at 12/26/19 1317    losartan (COZAAR) tablet 100 mg  970 mg Oral Daily Defaith , APRN - CNP   100 mg at 12/26/19 1317    metoprolol succinate (TOPROL XL) extended release tablet 100 mg  545 mg Oral Daily Jonna , APRN - CNP   100 mg at 12/26/19 1317    docusate sodium (COLACE) capsule attention span and concentration. Language: intact naming, repeating and fluency   Good fund of Knowledge. Cranial Nerves:   II: Visual fields: Full. Pupils: equal, round, reactive to light  III,IV,VI: Extra Ocular Movements are intact. No nystagmus  V: Facial sensation is intact  VII: Facial strength and movements: intact and symmetric  IX: Palate elevation is symmetric  XI: Shoulder shrug is intact  XII: Tongue movements are normal  Musculoskeletal: 5/5 in all 4 extremities. Tone: Normal tone. Reflexes: Symmetric in both arms and legs. No myoclonus or tremors today. Planters: flexor bilaterally. Coordination: no pronator drift, no dysmetria with FNF. Normal REM. Sensation: normal to all modalities in both arms and legs. Gait/Posture: Not tested due to poor cooperation      Data:  LABS:   Lab Results   Component Value Date     12/26/2019    K 3.9 12/26/2019    K 3.4 11/09/2019     12/26/2019    CO2 20 12/26/2019    BUN 20 12/26/2019    CREATININE 0.7 12/26/2019    GFRAA >60 12/26/2019    LABGLOM >60 12/26/2019    GLUCOSE 127 12/26/2019    PHOS 3.2 12/24/2019    MG 2.30 12/23/2019    CALCIUM 8.4 12/26/2019     Lab Results   Component Value Date    WBC 7.5 12/26/2019    RBC 3.41 12/26/2019    HGB 10.8 12/26/2019    HCT 32.3 12/26/2019    MCV 94.9 12/26/2019    RDW 15.1 12/26/2019     12/26/2019   No results found for: INR, PROTIME    Neuroimaging was independently reviewed by me and discussed results with the patient  I reviewed blood testing and other test results and discussed results with the patient.       Impression:  Acute metabolic encephalopathy  Acute kidney injury  Acute essential myoclonus secondary to acute kidney injury  Hypertension, not controlled  Hypothyroid      Recommendation  Continue current supportive care  Blood pressure monitoring continue current medications  Speech and swallow evaluation  PT and OT  Continue Synthroid  Aspirin  Statin  Can be discharged from neurology when medically stable  No further recommendation  We will sign off           Magdalena Lane MD   [phone]      This dictation was generated by voice recognition computer software. Although all attempts are made to edit the dictation for accuracy, there may be errors in the transcription that are not intended.

## 2019-12-26 NOTE — PROGRESS NOTES
Perform UE exer 15x each to improve endurance   Patient Goals   Patient goals :  \"Go home soon\"     Therapy Time   Individual Concurrent Group Co-treatment   Time In 4300         Time Out 1113         Minutes 26         Timed Code Treatment Minutes: 16 Minutes(10 min eval )   Lluvia Tian OTR/L

## 2019-12-26 NOTE — PROGRESS NOTES
hematuria or proteinuria. - Kidney function back to normal   -dc IVF  - Please avoid any nephrotoxic agents such as NSAIDs or IV contrast unless deemed necessary.  - BMP daily.     Hyponatremia, chronic.  - In the setting of hypothyroidism. TSH of 40.22.  - Serum sodium at baseline.  - On IV Levothyroxine per primary service.     AMS. - CT scan of the head with no acute intracranial abnormality.  - Neurology consulted.     Hypertension.  - resume hydalzine at 2 mg TID and tirate as needed   On Hydralazine IV PRN.     Will sign off    Anna Clark MD  [facility]  Office: [phone]  Fax:    [phone]

## 2019-12-27 VITALS
SYSTOLIC BLOOD PRESSURE: 145 MMHG | RESPIRATION RATE: 16 BRPM | HEIGHT: 65 IN | HEART RATE: 67 BPM | DIASTOLIC BLOOD PRESSURE: 70 MMHG | OXYGEN SATURATION: 100 % | TEMPERATURE: 97.7 F | WEIGHT: 115.96 LBS | BODY MASS INDEX: 19.32 KG/M2

## 2019-12-27 LAB
ANION GAP SERPL CALCULATED.3IONS-SCNC: 8 MMOL/L (ref 3–16)
BUN BLDV-MCNC: 13 MG/DL (ref 7–20)
CALCIUM SERPL-MCNC: 8.7 MG/DL (ref 8.3–10.6)
CHLORIDE BLD-SCNC: 99 MMOL/L (ref 99–110)
CO2: 24 MMOL/L (ref 21–32)
CREAT SERPL-MCNC: 0.7 MG/DL (ref 0.6–1.2)
GFR AFRICAN AMERICAN: >60
GFR NON-AFRICAN AMERICAN: >60
GLUCOSE BLD-MCNC: 103 MG/DL (ref 70–99)
HCT VFR BLD CALC: 34.4 % (ref 36–48)
HEMOGLOBIN: 11.8 G/DL (ref 12–16)
MCH RBC QN AUTO: 32.1 PG (ref 26–34)
MCHC RBC AUTO-ENTMCNC: 34.3 G/DL (ref 31–36)
MCV RBC AUTO: 93.6 FL (ref 80–100)
PDW BLD-RTO: 14.5 % (ref 12.4–15.4)
PLATELET # BLD: 317 K/UL (ref 135–450)
PMV BLD AUTO: 7.2 FL (ref 5–10.5)
POTASSIUM SERPL-SCNC: 4.1 MMOL/L (ref 3.5–5.1)
RBC # BLD: 3.68 M/UL (ref 4–5.2)
SODIUM BLD-SCNC: 131 MMOL/L (ref 136–145)
WBC # BLD: 8 K/UL (ref 4–11)

## 2019-12-27 PROCEDURE — 6370000000 HC RX 637 (ALT 250 FOR IP): Performed by: INTERNAL MEDICINE

## 2019-12-27 PROCEDURE — 85027 COMPLETE CBC AUTOMATED: CPT

## 2019-12-27 PROCEDURE — 6360000002 HC RX W HCPCS: Performed by: INTERNAL MEDICINE

## 2019-12-27 PROCEDURE — 6370000000 HC RX 637 (ALT 250 FOR IP): Performed by: NURSE PRACTITIONER

## 2019-12-27 PROCEDURE — 80048 BASIC METABOLIC PNL TOTAL CA: CPT

## 2019-12-27 PROCEDURE — 2580000003 HC RX 258: Performed by: INTERNAL MEDICINE

## 2019-12-27 PROCEDURE — 36415 COLL VENOUS BLD VENIPUNCTURE: CPT

## 2019-12-27 RX ORDER — ALPRAZOLAM 0.25 MG/1
0.25 TABLET ORAL 2 TIMES DAILY
Qty: 2 TABLET | Refills: 0 | Status: SHIPPED | OUTPATIENT
Start: 2019-12-27 | End: 2020-03-26

## 2019-12-27 RX ORDER — TRAMADOL HYDROCHLORIDE 50 MG/1
50 TABLET ORAL EVERY 6 HOURS PRN
Qty: 4 TABLET | Refills: 0 | Status: SHIPPED | OUTPATIENT
Start: 2019-12-27 | End: 2019-12-30

## 2019-12-27 RX ADMIN — ASPIRIN 81 MG: 81 TABLET, COATED ORAL at 09:54

## 2019-12-27 RX ADMIN — HYDRALAZINE HYDROCHLORIDE 25 MG: 25 TABLET, FILM COATED ORAL at 14:48

## 2019-12-27 RX ADMIN — HEPARIN SODIUM 5000 UNITS: 5000 INJECTION INTRAVENOUS; SUBCUTANEOUS at 05:26

## 2019-12-27 RX ADMIN — Medication 10 ML: at 09:55

## 2019-12-27 RX ADMIN — LEVOTHYROXINE SODIUM 100 MCG: 100 TABLET ORAL at 06:40

## 2019-12-27 RX ADMIN — LOSARTAN POTASSIUM 100 MG: 100 TABLET, FILM COATED ORAL at 09:55

## 2019-12-27 RX ADMIN — HEPARIN SODIUM 5000 UNITS: 5000 INJECTION INTRAVENOUS; SUBCUTANEOUS at 14:48

## 2019-12-27 RX ADMIN — HYDRALAZINE HYDROCHLORIDE 25 MG: 25 TABLET, FILM COATED ORAL at 05:25

## 2019-12-27 RX ADMIN — METOPROLOL SUCCINATE 100 MG: 50 TABLET, EXTENDED RELEASE ORAL at 09:54

## 2019-12-27 RX ADMIN — DOCUSATE SODIUM 100 MG: 100 CAPSULE, LIQUID FILLED ORAL at 09:54

## 2019-12-27 RX ADMIN — HYDRALAZINE HYDROCHLORIDE 50 MG: 25 TABLET, FILM COATED ORAL at 09:55

## 2019-12-27 RX ADMIN — AMIODARONE HYDROCHLORIDE 200 MG: 200 TABLET ORAL at 09:55

## 2019-12-27 RX ADMIN — TRAMADOL HYDROCHLORIDE 50 MG: 50 TABLET, FILM COATED ORAL at 09:54

## 2019-12-27 RX ADMIN — ALPRAZOLAM 0.25 MG: 0.25 TABLET ORAL at 09:55

## 2019-12-27 ASSESSMENT — PAIN SCALES - GENERAL: PAINLEVEL_OUTOF10: 6

## 2019-12-27 NOTE — CARE COORDINATION
CM spoke to [facility] and updated that they are able to accept pt and that pt had discharged from their facility only a few weeks ago. Per [facility], pt has commercial insurance and they would like a precert initiated to get pt skilled therapy. Son is aware that he is in co-pay days and has out of pocket expenses. If precert is denied [facility] is aware of discharge order and will accept pt today and work with family to obtain finances. CM following-Altagracia Wilde RN       All clinicals faxed to [facility] to initiated precert on this day.

## 2019-12-27 NOTE — CARE COORDINATION
ELIZABETH called [facility] to follow up on precert and is it still under review, however someone is working on it now and we should have an answer soon.

## 2019-12-27 NOTE — CARE COORDINATION
CASE MANAGEMENT DISCHARGE SUMMARY      Discharge to: [facility] completed: yes  Hospital Exemption Notification (HENS) completed: yes    Transportation: ambulance   Medical Transport explained to pt/family. Pt/family voice no agency preference. Agency used: [facility] up time: 1600   Ambulance form completed: Yes    Notified: RN aware    Facility/Agency: [facility] faxed to [phone]   Phone number for report to facility: [phone]    Note: Discharging nurse to complete [facility], reconcile [facility], and place final copy with patient's discharge packet. RN to ensure that written prescriptions for  Level II medications are sent with patient to the facility as per protocol.

## 2019-12-27 NOTE — DISCHARGE INSTR - COC
Continuity of Care Form    Patient Name: Gregory Antonio   :  1935  MRN:  [id number]    Admit date:  2019  Discharge date:  ***    Code Status Order: Full Code   Advance Directives:   Advance Care Flowsheet Documentation     Date/Time Healthcare Directive Type of Healthcare Directive Copy in [address] Agent's Name Healthcare Agent's Phone Number    [phone]  No, patient does not have an advance directive for healthcare treatment -- -- -- -- --          Admitting Physician:  Pamela Ceballos MD  PCP: Jayjay Eubanks MD    Discharging Nurse: [facility] Unit/Room#: 8719/7478-64  Discharging Unit Phone Number: ***    Emergency Contact:   Extended Emergency Contact Information  Primary Emergency Contact: TishTroy   [address] Phone: [phone]  Relation: Child    Past Surgical History:  Past Surgical History:   Procedure Laterality Date    HYSTERECTOMY      THYROIDECTOMY         Immunization History: There is no immunization history on file for this patient.     Active Problems:  Patient Active Problem List   Diagnosis Code    Essential hypertension I10    Hematuria R31.9    Grief reaction F43.21    Cystitis N30.90    Anxiety F41.9    Chest pain R07.9    Hypothyroid E03.9    Hyponatremia E87.1    Ovarian cancer (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.37    Lumbosacral spondylosis without Status/Restrictions: No weight bearing restirctions  Other Medical Equipment (for information only, NOT a DME order):  stedy  Other Treatments: ***    Patient's personal belongings (please select all that are sent with patient):  Dentures upper and lower    RN SIGNATURE:  Electronically signed by Ricky Thomas RN on 12/27/19 at 12:05 PM    CASE MANAGEMENT/SOCIAL WORK SECTION    Inpatient Status Date: 12/23/2019    Readmission Risk Assessment Score:  Readmission Risk              Risk of Unplanned Readmission:        18           Discharging to Facility/ Agency   · Name: [address] [place] [address]  · Phone:[phone]  · Fax:[phone]    / signature: Electronically signed by Katie Jurado RN on 12/27/19 at 3:24 PM    PHYSICIAN SECTION    Prognosis: Good    Condition at Discharge: Stable    Rehab Potential (if transferring to Rehab): Good    Recommended Labs or Other Treatments After Discharge: ***    Physician Certification: I certify the above information and transfer of Monica Cortez  is necessary for the continuing treatment of the diagnosis listed and that she requires [facility] for less 30 days.      Update Admission H&P: No change in H&P    PHYSICIAN SIGNATURE:  Electronically signed by SANYA London CNP on 12/27/19 at 3:32 PM

## 2019-12-27 NOTE — PROGRESS NOTES
Pt report called to [facility] nurse Valencia Monroe. Pt to be transported to facility at 1600.

## 2020-01-21 NOTE — DISCHARGE SUMMARY
oz (52.6 kg)   SpO2 100%   BMI 19.30 kg/m²     General appearance:  Somnolent but easily arousable and in no apparent distress, appears stated age and cooperative. HEENT:  Normal cephalic, atraumatic without obvious deformity. Neck: Supple, with full range of motion. No jugular venous distention. Trachea midline. Respiratory:  Normal respiratory effort. Clear to auscultation, bilaterally without Rales/Wheezes/Rhonchi.  Diminished breath sounds at bases  Cardiovascular:  Regular rate and rhythm with normal S1/S2 without murmurs, rubs or gallops. Abdomen: Soft, non-tender, non-distended with normal bowel sounds. Musculoskeletal:  No clubbing, cyanosis or edema bilaterally.  Full range of motion without deformity. Skin: Skin color, texture, turgor normal.  No rashes or lesions. Neurologic:  Somnolent  with involuntary tremors noted in upper and lower extremities on the left side. Psychiatric: Unable to assess  Capillary Refill: Brisk,< 3 seconds   Peripheral Pulses: +2 palpable, equal bilaterally          Labs: For convenience and continuity at follow-up the following most recent labs are provided:      CBC:    Lab Results   Component Value Date    WBC 8.0 12/27/2019    HGB 11.8 12/27/2019    HCT 34.4 12/27/2019     12/27/2019       Renal:    Lab Results   Component Value Date     12/27/2019    K 4.1 12/27/2019    K 3.4 11/09/2019    CL 99 12/27/2019    CO2 24 12/27/2019    BUN 13 12/27/2019    CREATININE 0.7 12/27/2019    CALCIUM 8.7 12/27/2019    PHOS 3.2 12/24/2019         Significant Diagnostic Studies    Radiology:   CT Head WO Contrast   Final Result   Technically limited exam without acute intracranial abnormality         CT Cervical Spine WO Contrast   Final Result   No acute abnormality of the cervical spine. XR CHEST PORTABLE   Final Result   Cardiomegaly with no acute finding or significant change.                 Consults:     IP CONSULT TO HOSPITALIST  IP CONSULT TO NEUROLOGY    Disposition:  SNF EGS     Condition at Discharge: Stable    Discharge Instructions/Follow-up:  No future appointments. Code Status:  FC    Activity: activity as tolerated    Diet: cardiac diet      Discharge Medications:     Discharge Medication List as of 12/27/2019  3:39 PM           Details   traMADol (ULTRAM) 50 MG tablet Take 1 tablet by mouth every 6 hours as needed for Pain for up to 3 days. , Disp-4 tablet, R-0Print              Details   ALPRAZolam (XANAX) 0.25 MG tablet Take 1 tablet by mouth 2 times daily for 90 days. , Disp-2 tablet, R-0Print              Details   amiodarone (CORDARONE) 200 MG tablet TAKE 1 TABLET BY MOUTH ONCE DAILY, R-2Historical Med      aspirin 81 MG EC tablet Take 81 mg by mouthHistorical Med      !! mupirocin (BACTROBAN) 2 % ointment by Intratympanic route, Intratympanic, Starting Sat 11/2/2019, Historical Med      !! mupirocin (BACTROBAN) 2 % ointment APPLY OINTMENT TOPICALLY THREE TIMES DAILY, R-0, Historical Med      !! NIFEdipine (ADALAT CC) 30 MG extended release tablet Take 30 mg by mouthHistorical Med      !! NIFEdipine (ADALAT CC) 30 MG extended release tablet TAKE 1 TABLET BY MOUTH NIGHTLY, R-11Historical Med      losartan (COZAAR) 100 MG tablet TAKE 1 TABLET EVERY DAY, Disp-90 tablet, R-1Normal      metoprolol succinate (TOPROL XL) 100 MG extended release tablet TAKE 1 TABLET EVERY DAY, Disp-90 tablet, R-1Normal      omeprazole (PRILOSEC) 20 MG delayed release capsule TAKE 1 CAPSULE EVERY DAY, Disp-90 capsule, R-1Normal      levothyroxine (SYNTHROID) 100 MCG tablet Take 1 tablet by mouth Daily, Disp-90 tablet, R-1Normal      hydrALAZINE (APRESOLINE) 50 MG tablet Take 1 tablet by mouth 3 times daily, Disp-270 tablet, R-3Normal       !! - Potential duplicate medications found. Please discuss with provider.           Time Spent on discharge is more than 45 minutes in the examination, evaluation, counseling and review of medications and discharge plan.      Signed:    SANYA Dinh - CNP   1/21/2020      Thank you Benito Turner MD for the opportunity to be involved in this patient's care. If you have any questions or concerns please feel free to contact me at [phone].

## 2020-04-24 NOTE — PLAN OF CARE
Increase patients ADLs/functional status to baseline.
Problem: Falls - Risk of:  Goal: Will remain free from falls  Description  Will remain free from falls  Outcome: Ongoing  Goal: Absence of physical injury  Description  Absence of physical injury  Outcome: Ongoing     Problem: Pain:  Goal: Pain level will decrease  Description  Pain level will decrease  Outcome: Ongoing  Goal: Control of acute pain  Description  Control of acute pain  Outcome: Ongoing  Goal: Control of chronic pain  Description  Control of chronic pain  Outcome: Ongoing     Problem: Mental Status - Impaired:  Goal: Mental status will improve  Description  Mental status will improve  12/25/2019 0500 by Alejandro Escalona RN  Outcome: Ongoing  12/24/2019 1711 by Brett Herman RN  Outcome: Ongoing     Problem: Cardiac:  Goal: Ability to maintain vital signs within normal range will improve  Description  Ability to maintain vital signs within normal range will improve  12/25/2019 0500 by Alejandro Escalona RN  Outcome: Ongoing  12/24/2019 1711 by Brett Herman RN  Outcome: Ongoing  Goal: Cardiovascular alteration will improve  Description  Cardiovascular alteration will improve  Outcome: Ongoing
Problem: Mental Status - Impaired:  Goal: Mental status will improve  Description  Mental status will improve  Outcome: Ongoing     Problem: Cardiac:  Goal: Ability to maintain vital signs within normal range will improve  Description  Ability to maintain vital signs within normal range will improve  Outcome: Ongoing
Problem: Nutrition  Intervention: Swallowing evaluation  Note:   Bedside swallow evaluation completed this date. Hayden Clemens M.S. [address] [facility]  Speech-language pathologist  [id number]      Intervention: Aspiration precautions  Note:   Bedside swallow evaluation completed this date.     Hayden Clemens M.S. [address] [facility]  Speech-language pathologist  [id number]
Progress functional mobility
negative...

## 2020-05-11 ENCOUNTER — APPOINTMENT (OUTPATIENT)
Dept: CT IMAGING | Age: 85
DRG: 071 | End: 2020-05-11
Payer: MEDICARE

## 2020-05-11 ENCOUNTER — HOSPITAL ENCOUNTER (INPATIENT)
Age: 85
LOS: 3 days | Discharge: HOME | DRG: 071 | End: 2020-05-14
Attending: EMERGENCY MEDICINE | Admitting: INTERNAL MEDICINE
Payer: MEDICARE

## 2020-05-11 ENCOUNTER — APPOINTMENT (OUTPATIENT)
Dept: GENERAL RADIOLOGY | Age: 85
DRG: 071 | End: 2020-05-11
Payer: MEDICARE

## 2020-05-11 LAB
A/G RATIO: 1.3 (ref 1.1–2.2)
ALBUMIN SERPL-MCNC: 3.9 G/DL (ref 3.4–5)
ALP BLD-CCNC: 208 U/L (ref 40–129)
ALT SERPL-CCNC: 19 U/L (ref 10–40)
ANION GAP SERPL CALCULATED.3IONS-SCNC: 12 MMOL/L (ref 3–16)
ANION GAP SERPL CALCULATED.3IONS-SCNC: 13 MMOL/L (ref 3–16)
AST SERPL-CCNC: 25 U/L (ref 15–37)
BACTERIA: ABNORMAL /HPF
BASE EXCESS VENOUS: -0.6 MMOL/L (ref -3–3)
BASOPHILS ABSOLUTE: 0 K/UL (ref 0–0.2)
BASOPHILS RELATIVE PERCENT: 0.4 %
BILIRUB SERPL-MCNC: 0.5 MG/DL (ref 0–1)
BILIRUBIN URINE: NEGATIVE
BLOOD, URINE: ABNORMAL
BUN BLDV-MCNC: 24 MG/DL (ref 7–20)
BUN BLDV-MCNC: 26 MG/DL (ref 7–20)
CALCIUM SERPL-MCNC: 9.3 MG/DL (ref 8.3–10.6)
CALCIUM SERPL-MCNC: 9.4 MG/DL (ref 8.3–10.6)
CARBOXYHEMOGLOBIN: 2.3 % (ref 0–1.5)
CHLORIDE BLD-SCNC: 90 MMOL/L (ref 99–110)
CHLORIDE BLD-SCNC: 91 MMOL/L (ref 99–110)
CLARITY: CLEAR
CO2: 26 MMOL/L (ref 21–32)
CO2: 27 MMOL/L (ref 21–32)
COLOR: YELLOW
CREAT SERPL-MCNC: 1 MG/DL (ref 0.6–1.2)
CREAT SERPL-MCNC: 1.1 MG/DL (ref 0.6–1.2)
EKG ATRIAL RATE: 49 BPM
EKG DIAGNOSIS: NORMAL
EKG P AXIS: 16 DEGREES
EKG P-R INTERVAL: 170 MS
EKG Q-T INTERVAL: 480 MS
EKG QRS DURATION: 92 MS
EKG QTC CALCULATION (BAZETT): 433 MS
EKG R AXIS: 8 DEGREES
EKG T AXIS: 104 DEGREES
EKG VENTRICULAR RATE: 49 BPM
EOSINOPHILS ABSOLUTE: 0.3 K/UL (ref 0–0.6)
EOSINOPHILS RELATIVE PERCENT: 3.6 %
EPITHELIAL CELLS, UA: ABNORMAL /HPF (ref 0–5)
ETHANOL: NORMAL MG/DL (ref 0–0.08)
GFR AFRICAN AMERICAN: 57
GFR AFRICAN AMERICAN: >60
GFR NON-AFRICAN AMERICAN: 47
GFR NON-AFRICAN AMERICAN: 53
GLOBULIN: 3 G/DL
GLUCOSE BLD-MCNC: 108 MG/DL (ref 70–99)
GLUCOSE BLD-MCNC: 110 MG/DL (ref 70–99)
GLUCOSE URINE: NEGATIVE MG/DL
HCO3 VENOUS: 24.7 MMOL/L (ref 23–29)
HCT VFR BLD CALC: 40.7 % (ref 36–48)
HEMOGLOBIN: 13.6 G/DL (ref 12–16)
KETONES, URINE: NEGATIVE MG/DL
LEUKOCYTE ESTERASE, URINE: ABNORMAL
LYMPHOCYTES ABSOLUTE: 1.9 K/UL (ref 1–5.1)
LYMPHOCYTES RELATIVE PERCENT: 19.6 %
MCH RBC QN AUTO: 30.8 PG (ref 26–34)
MCHC RBC AUTO-ENTMCNC: 33.5 G/DL (ref 31–36)
MCV RBC AUTO: 92.2 FL (ref 80–100)
METHEMOGLOBIN VENOUS: 0.3 %
MICROSCOPIC EXAMINATION: YES
MONOCYTES ABSOLUTE: 0.6 K/UL (ref 0–1.3)
MONOCYTES RELATIVE PERCENT: 6.8 %
NEUTROPHILS ABSOLUTE: 6.6 K/UL (ref 1.7–7.7)
NEUTROPHILS RELATIVE PERCENT: 69.6 %
NITRITE, URINE: POSITIVE
O2 CONTENT, VEN: 15 VOL %
O2 SAT, VEN: 80 %
O2 THERAPY: ABNORMAL
PCO2, VEN: 43.2 MMHG (ref 40–50)
PDW BLD-RTO: 16 % (ref 12.4–15.4)
PH UA: 6.5 (ref 5–8)
PH VENOUS: 7.38 (ref 7.35–7.45)
PLATELET # BLD: 271 K/UL (ref 135–450)
PMV BLD AUTO: 7.3 FL (ref 5–10.5)
PO2, VEN: 46.2 MMHG (ref 25–40)
POTASSIUM REFLEX MAGNESIUM: 3.9 MMOL/L (ref 3.5–5.1)
POTASSIUM SERPL-SCNC: 3.7 MMOL/L (ref 3.5–5.1)
PROTEIN UA: 30 MG/DL
RBC # BLD: 4.42 M/UL (ref 4–5.2)
RBC UA: ABNORMAL /HPF (ref 0–4)
SODIUM BLD-SCNC: 129 MMOL/L (ref 136–145)
SODIUM BLD-SCNC: 130 MMOL/L (ref 136–145)
SPECIFIC GRAVITY UA: 1.02 (ref 1–1.03)
T4 FREE: 1.2 NG/DL (ref 0.9–1.8)
TCO2 CALC VENOUS: 26 MMOL/L
TOTAL PROTEIN: 6.9 G/DL (ref 6.4–8.2)
TROPONIN: 0.03 NG/ML
TROPONIN: 0.03 NG/ML
TSH REFLEX: 54.07 UIU/ML (ref 0.27–4.2)
URINE REFLEX TO CULTURE: ABNORMAL
URINE TYPE: ABNORMAL
UROBILINOGEN, URINE: 1 E.U./DL
WBC # BLD: 9.5 K/UL (ref 4–11)
WBC UA: ABNORMAL /HPF (ref 0–5)

## 2020-05-11 PROCEDURE — 80053 COMPREHEN METABOLIC PANEL: CPT

## 2020-05-11 PROCEDURE — G0480 DRUG TEST DEF 1-7 CLASSES: HCPCS

## 2020-05-11 PROCEDURE — 6370000000 HC RX 637 (ALT 250 FOR IP): Performed by: INTERNAL MEDICINE

## 2020-05-11 PROCEDURE — 93010 ELECTROCARDIOGRAM REPORT: CPT | Performed by: INTERNAL MEDICINE

## 2020-05-11 PROCEDURE — 87040 BLOOD CULTURE FOR BACTERIA: CPT

## 2020-05-11 PROCEDURE — 85025 COMPLETE CBC W/AUTO DIFF WBC: CPT

## 2020-05-11 PROCEDURE — 1200000000 HC SEMI PRIVATE

## 2020-05-11 PROCEDURE — 84484 ASSAY OF TROPONIN QUANT: CPT

## 2020-05-11 PROCEDURE — 70450 CT HEAD/BRAIN W/O DYE: CPT

## 2020-05-11 PROCEDURE — 71045 X-RAY EXAM CHEST 1 VIEW: CPT

## 2020-05-11 PROCEDURE — 81001 URINALYSIS AUTO W/SCOPE: CPT

## 2020-05-11 PROCEDURE — 2580000003 HC RX 258: Performed by: EMERGENCY MEDICINE

## 2020-05-11 PROCEDURE — 36415 COLL VENOUS BLD VENIPUNCTURE: CPT

## 2020-05-11 PROCEDURE — 84443 ASSAY THYROID STIM HORMONE: CPT

## 2020-05-11 PROCEDURE — 6360000002 HC RX W HCPCS: Performed by: EMERGENCY MEDICINE

## 2020-05-11 PROCEDURE — 82803 BLOOD GASES ANY COMBINATION: CPT

## 2020-05-11 PROCEDURE — 84439 ASSAY OF FREE THYROXINE: CPT

## 2020-05-11 PROCEDURE — 2580000003 HC RX 258: Performed by: INTERNAL MEDICINE

## 2020-05-11 PROCEDURE — 99285 EMERGENCY DEPT VISIT HI MDM: CPT

## 2020-05-11 PROCEDURE — 93005 ELECTROCARDIOGRAM TRACING: CPT | Performed by: EMERGENCY MEDICINE

## 2020-05-11 PROCEDURE — 71250 CT THORAX DX C-: CPT

## 2020-05-11 RX ORDER — LOSARTAN POTASSIUM 100 MG/1
100 TABLET ORAL DAILY
Status: DISCONTINUED | OUTPATIENT
Start: 2020-05-11 | End: 2020-05-14 | Stop reason: HOSPADM

## 2020-05-11 RX ORDER — POLYETHYLENE GLYCOL 3350 17 G/17G
17 POWDER, FOR SOLUTION ORAL DAILY PRN
Status: DISCONTINUED | OUTPATIENT
Start: 2020-05-11 | End: 2020-05-14 | Stop reason: HOSPADM

## 2020-05-11 RX ORDER — ACETAMINOPHEN 650 MG/1
650 SUPPOSITORY RECTAL EVERY 6 HOURS PRN
Status: DISCONTINUED | OUTPATIENT
Start: 2020-05-11 | End: 2020-05-14 | Stop reason: HOSPADM

## 2020-05-11 RX ORDER — HYDRALAZINE HYDROCHLORIDE 50 MG/1
50 TABLET, FILM COATED ORAL 3 TIMES DAILY
Status: DISCONTINUED | OUTPATIENT
Start: 2020-05-11 | End: 2020-05-11

## 2020-05-11 RX ORDER — 0.9 % SODIUM CHLORIDE 0.9 %
1000 INTRAVENOUS SOLUTION INTRAVENOUS ONCE
Status: COMPLETED | OUTPATIENT
Start: 2020-05-11 | End: 2020-05-11

## 2020-05-11 RX ORDER — LEVOTHYROXINE SODIUM 0.1 MG/1
100 TABLET ORAL DAILY
Status: DISCONTINUED | OUTPATIENT
Start: 2020-05-11 | End: 2020-05-11

## 2020-05-11 RX ORDER — PROMETHAZINE HYDROCHLORIDE 25 MG/1
12.5 TABLET ORAL EVERY 6 HOURS PRN
Status: DISCONTINUED | OUTPATIENT
Start: 2020-05-11 | End: 2020-05-14 | Stop reason: HOSPADM

## 2020-05-11 RX ORDER — NIFEDIPINE 30 MG/1
30 TABLET, FILM COATED, EXTENDED RELEASE ORAL NIGHTLY
Status: DISCONTINUED | OUTPATIENT
Start: 2020-05-11 | End: 2020-05-14 | Stop reason: HOSPADM

## 2020-05-11 RX ORDER — ALPRAZOLAM 0.25 MG/1
0.25 TABLET ORAL NIGHTLY PRN
Status: ON HOLD | COMMUNITY
End: 2020-11-09 | Stop reason: HOSPADM

## 2020-05-11 RX ORDER — SODIUM CHLORIDE 0.9 % (FLUSH) 0.9 %
10 SYRINGE (ML) INJECTION EVERY 12 HOURS SCHEDULED
Status: DISCONTINUED | OUTPATIENT
Start: 2020-05-11 | End: 2020-05-14 | Stop reason: HOSPADM

## 2020-05-11 RX ORDER — NIFEDIPINE 30 MG/1
30 TABLET, FILM COATED, EXTENDED RELEASE ORAL DAILY
Status: DISCONTINUED | OUTPATIENT
Start: 2020-05-11 | End: 2020-05-11

## 2020-05-11 RX ORDER — SODIUM CHLORIDE 0.9 % (FLUSH) 0.9 %
10 SYRINGE (ML) INJECTION PRN
Status: DISCONTINUED | OUTPATIENT
Start: 2020-05-11 | End: 2020-05-14 | Stop reason: HOSPADM

## 2020-05-11 RX ORDER — ONDANSETRON 2 MG/ML
4 INJECTION INTRAMUSCULAR; INTRAVENOUS EVERY 6 HOURS PRN
Status: DISCONTINUED | OUTPATIENT
Start: 2020-05-11 | End: 2020-05-14 | Stop reason: HOSPADM

## 2020-05-11 RX ORDER — ACETAMINOPHEN 325 MG/1
650 TABLET ORAL EVERY 6 HOURS PRN
Status: DISCONTINUED | OUTPATIENT
Start: 2020-05-11 | End: 2020-05-14 | Stop reason: HOSPADM

## 2020-05-11 RX ORDER — LEVOTHYROXINE SODIUM 0.05 MG/1
50 TABLET ORAL DAILY
Status: DISCONTINUED | OUTPATIENT
Start: 2020-05-11 | End: 2020-05-14 | Stop reason: HOSPADM

## 2020-05-11 RX ORDER — SODIUM CHLORIDE 9 MG/ML
INJECTION, SOLUTION INTRAVENOUS CONTINUOUS
Status: DISCONTINUED | OUTPATIENT
Start: 2020-05-11 | End: 2020-05-14 | Stop reason: HOSPADM

## 2020-05-11 RX ORDER — PANTOPRAZOLE SODIUM 40 MG/1
40 TABLET, DELAYED RELEASE ORAL
Status: DISCONTINUED | OUTPATIENT
Start: 2020-05-12 | End: 2020-05-14 | Stop reason: HOSPADM

## 2020-05-11 RX ORDER — HYDRALAZINE HYDROCHLORIDE 50 MG/1
100 TABLET, FILM COATED ORAL 3 TIMES DAILY PRN
Status: DISCONTINUED | OUTPATIENT
Start: 2020-05-11 | End: 2020-05-14 | Stop reason: HOSPADM

## 2020-05-11 RX ORDER — ALPRAZOLAM 0.25 MG/1
0.25 TABLET ORAL NIGHTLY PRN
Status: DISCONTINUED | OUTPATIENT
Start: 2020-05-11 | End: 2020-05-14 | Stop reason: HOSPADM

## 2020-05-11 RX ORDER — ASPIRIN 81 MG/1
81 TABLET ORAL DAILY
Status: DISCONTINUED | OUTPATIENT
Start: 2020-05-11 | End: 2020-05-14 | Stop reason: HOSPADM

## 2020-05-11 RX ORDER — TRAMADOL HYDROCHLORIDE 50 MG/1
50 TABLET ORAL EVERY 6 HOURS PRN
COMMUNITY

## 2020-05-11 RX ORDER — TRAMADOL HYDROCHLORIDE 50 MG/1
50 TABLET ORAL EVERY 6 HOURS PRN
Status: DISCONTINUED | OUTPATIENT
Start: 2020-05-11 | End: 2020-05-14 | Stop reason: HOSPADM

## 2020-05-11 RX ORDER — METOPROLOL SUCCINATE 50 MG/1
100 TABLET, EXTENDED RELEASE ORAL DAILY
Status: DISCONTINUED | OUTPATIENT
Start: 2020-05-11 | End: 2020-05-14 | Stop reason: HOSPADM

## 2020-05-11 RX ADMIN — CEFTRIAXONE SODIUM 1 G: 1 INJECTION, POWDER, FOR SOLUTION INTRAMUSCULAR; INTRAVENOUS at 14:28

## 2020-05-11 RX ADMIN — SODIUM CHLORIDE: 9 INJECTION, SOLUTION INTRAVENOUS at 19:15

## 2020-05-11 RX ADMIN — NIFEDIPINE 30 MG: 30 TABLET, FILM COATED, EXTENDED RELEASE ORAL at 19:39

## 2020-05-11 RX ADMIN — ALPRAZOLAM 0.25 MG: 0.25 TABLET ORAL at 19:39

## 2020-05-11 RX ADMIN — SODIUM CHLORIDE 1000 ML: 9 INJECTION, SOLUTION INTRAVENOUS at 13:28

## 2020-05-11 ASSESSMENT — PAIN DESCRIPTION - LOCATION: LOCATION: BACK;SHOULDER

## 2020-05-11 ASSESSMENT — PAIN SCALES - GENERAL: PAINLEVEL_OUTOF10: 7

## 2020-05-11 NOTE — ED PROVIDER NOTES
following components:    Sodium 129 (*)     Chloride 90 (*)     Glucose 108 (*)     BUN 26 (*)     GFR Non- 47 (*)     GFR African American 57 (*)     Alkaline Phosphatase 208 (*)     All other components within normal limits    Narrative:     Performed at:  [address], [address] [employer]   Phone [phone]   TROPONIN - Abnormal; Notable for the following components:    Troponin 0.03 (*)     All other components within normal limits    Narrative:     Performed at:  [address], [address] [employer]   Phone [phone]   URINE RT REFLEX TO CULTURE - Abnormal; Notable for the following components:    Blood, Urine LARGE (*)     Protein, UA 30 (*)     Nitrite, Urine POSITIVE (*)     Leukocyte Esterase, Urine TRACE (*)     All other components within normal limits    Narrative:     Performed at:  [address], [address] [employer]   Phone [phone]   BLOOD GAS, VENOUS - Abnormal; Notable for the following components:    pO2, Steve 46.2 (*)     Carboxyhemoglobin 2.3 (*)     All other components within normal limits    Narrative:     Performed at:  [address], [address] [employer]   Phone [phone]   ETHANOL    Narrative:     Performed at:  [address], [address] [employer]   Phone [phone]   TSH WITH REFLEX   MICROSCOPIC URINALYSIS       Interpretation per the Radiologist below, if obtained/available at the time of this note:    CT Head WO Contrast   Final Result   No hemorrhage or mass      Underlying atrophy with moderate to severe periventricular and scattered   frontal parietal white matter disease, likely due to small-vessel ischemic   change      Asymmetric lucency right frontal bone appears similar         XR CHEST PORTABLE Final Result   Pleural or parenchymal opacification at the right lateral lung base. Otherwise unremarkable chest.  Consider follow-up PA and lateral chest with   better inspiratory effort when patient condition permits. CT CHEST WO CONTRAST    (Results Pending)       All other labs/imaging were within normal range or not returned as of this dictation. EMERGENCY DEPARTMENT COURSE and DIFFERENTIAL DIAGNOSIS/MDM:   Vitals:    Vitals:    05/11/20 1159 05/11/20 1302 05/11/20 1322   BP: (!) 156/71 (!) 180/60 (!) 187/61   Pulse: 50 (!) 48 (!) 49   Resp: 18 11 13   Temp: 97.9 °F (36.6 °C)     TempSrc: Oral     SpO2: 100% 100% 98%   Weight: 116 lb (52.6 kg)     Height: 5' 5\" (1.651 m)         EKG: Sinus bradycardia rate of 49 bpm.  Septal Q waves. No ST elevation. Compared to prior EKG from 12/23/2019 no significant change other than the bradycardia is new    Patient presents emergency department today with complaints from family members for altered mental status. Does appear somewhat confused here. Unclear baseline as no family is present. Vital signs stable. Found to have acute on chronic hyponatremia as well as a likely urinary tract infection. Given 1 L of IV fluids for presumed dehydration due to some mild renal insufficiency as well as Rocephin for UTI and will be admitted to the hospital    MDM    CONSULTS     IP CONSULT TO HOSPITALIST    Critical Care:   None    REASSESSMENT          PROCEDURE     Unless otherwise noted below, none     Procedures      FINAL IMPRESSION      1. Altered mental status, unspecified altered mental status type    2. Hyponatremia            DISPOSITION/PLAN   DISPOSITION Decision To Admit 05/11/2020 01:20:59 PM        PATIENT REFERRED TO:  No follow-up provider specified.     DISCHARGE MEDICATIONS:  New Prescriptions    No medications on file     Controlled Substances Monitoring:     RX Monitoring 2/25/2019   Attestation The Prescription Monitoring Report for this patient was reviewed today.    Periodic Controlled Substance Monitoring -       (Please note that portions of this note were completed with a voice recognition program.  Efforts were made to edit the dictations but occasionally words are mis-transcribed.)    Ai Rodriguez MD (electronically signed)  Attending Emergency Physician           Bing Campo MD  05/11/20 0225

## 2020-05-11 NOTE — H&P
Hospital Medicine History & Physical      PCP: Lynette Mcelroy MD    Date of Admission: 5/11/2020    Date of Service: Pt seen/examined on 5/11/2020 and Admitted to Inpt    Chief Complaint: Altered mental status      History Of Present Illness: The patient is a 80 y.o. female who presents to [facility] with increasing confusion over the past 2 days. Patient is brought in by son who she lives with at home. Presently he is not available for history and interview. Patient currently is confused and is a poor historian. Information gathered from EMR review and chart review. Per ED note, patient was brought in by the son who states over the past 2 days he notes his mother having increasing confusion. He was very concerned regarding this change. It is unknown what her baseline function is, although she does have a diagnosis of cognitive impairment. Son brought in a list of medications for her home regimen, and reported that she takes these medications on a daily basis. He does not offer any new signs or symptoms that may be concerning, or etiology of her confusion. Patient was placed to the hospitalist service for further medical care and evaluation    Past Medical History:        Diagnosis Date    Acute bilateral low back pain with bilateral sciatica 6/8/2017    Acute kidney [facility] ([facility] [address]     Anxiety     Cancer ([facility] [address]     ovarian cancer    Degeneration of lumbar or lumbosacral intervertebral disc 5/7/2018    Depression     Hypertension     Hypothyroid 2/20/2017    Thyroid disease        Past Surgical History:        Procedure Laterality Date    HYSTERECTOMY      THYROIDECTOMY         Medications Prior to Admission:    Prior to Admission medications    Medication Sig Start Date End Date Taking? Authorizing Provider   traMADol (ULTRAM) 50 MG tablet Take 50 mg by mouth every 6 hours as needed for Pain.    Yes Historical Provider, MD Austin Marmolejo) 0.25 MG tablet Take 0.25 mg by mouth antihypertensives    GERD: Stable without any red alarm symptoms  -Medications: Protonix  -Continue to monitor and evaluate patient      DVT Prophylaxis: Lovenox  Diet: No diet orders on file general diet  Code Status: Prior  PT/OT Eval Status: Ordered    Dispo -expect 3 to 4 days pending clinical response medical therapy       Nilsa Smith MD    Thank you Maryann Mendoza MD for the opportunity to be involved in this patient's care. If you have any questions or concerns please feel free to contact me at [phone].

## 2020-05-11 NOTE — ED NOTES
Pt returned from CT, reconnected to monitors and bed alarm turned on.      Jarret Lagos RN  05/11/20 6657

## 2020-05-11 NOTE — ED NOTES
Spoke to patient's son he stated Santo Perez has been having hallucinations and confused behavior the past two days. He also stated that her urine was a dark brown color. He contacted her primary care and the MD stated to bring her to the ER due to past history of low sodium.       Kate Lazar RN  05/11/20 5154

## 2020-05-11 NOTE — ED NOTES
RN spoke with patient's son and updated him on pt's status. Pt also was looking for her wallet and she thought she had it brought with her. I spoke with the patient's son and he said he kept it at home. Pt spoke with her son as well to confirm that.       Silver Fermin RN  05/11/20 8354

## 2020-05-11 NOTE — ED NOTES

## 2020-05-11 NOTE — PROGRESS NOTES
Spoke to son on phone regarding pt and her baseline labs and orientation. Son is unable to state what baseline numbers were, but stated that primary addressed thyroid meds at last visit and that amiodarone was possibly making thyroid meds not work properly and he adjusted meds; He also stated that pt had baseline confusion, but that it was increasingly getting worse.

## 2020-05-11 NOTE — ED NOTES
Bedside report given to Lizzie. Pt is being taken to floor by transport.       Bishop Susana RN  05/11/20 8404

## 2020-05-11 NOTE — ED NOTES
Ps hosp @ 0134  Re: ams, hyponatremia per dr [email or web address]  05/11/20 4016

## 2020-05-12 LAB
ANION GAP SERPL CALCULATED.3IONS-SCNC: 9 MMOL/L (ref 3–16)
ANION GAP SERPL CALCULATED.3IONS-SCNC: 9 MMOL/L (ref 3–16)
BASOPHILS ABSOLUTE: 0.1 K/UL (ref 0–0.2)
BASOPHILS RELATIVE PERCENT: 0.7 %
BUN BLDV-MCNC: 22 MG/DL (ref 7–20)
BUN BLDV-MCNC: 26 MG/DL (ref 7–20)
CALCIUM SERPL-MCNC: 8.1 MG/DL (ref 8.3–10.6)
CALCIUM SERPL-MCNC: 8.7 MG/DL (ref 8.3–10.6)
CHLORIDE BLD-SCNC: 95 MMOL/L (ref 99–110)
CHLORIDE BLD-SCNC: 95 MMOL/L (ref 99–110)
CO2: 26 MMOL/L (ref 21–32)
CO2: 27 MMOL/L (ref 21–32)
CREAT SERPL-MCNC: 0.9 MG/DL (ref 0.6–1.2)
CREAT SERPL-MCNC: 1 MG/DL (ref 0.6–1.2)
EOSINOPHILS ABSOLUTE: 0.5 K/UL (ref 0–0.6)
EOSINOPHILS RELATIVE PERCENT: 5.7 %
GFR AFRICAN AMERICAN: >60
GFR AFRICAN AMERICAN: >60
GFR NON-AFRICAN AMERICAN: 53
GFR NON-AFRICAN AMERICAN: 59
GLUCOSE BLD-MCNC: 100 MG/DL (ref 70–99)
GLUCOSE BLD-MCNC: 107 MG/DL (ref 70–99)
HCT VFR BLD CALC: 34.4 % (ref 36–48)
HEMOGLOBIN: 11.5 G/DL (ref 12–16)
LYMPHOCYTES ABSOLUTE: 2 K/UL (ref 1–5.1)
LYMPHOCYTES RELATIVE PERCENT: 23 %
MCH RBC QN AUTO: 30.8 PG (ref 26–34)
MCHC RBC AUTO-ENTMCNC: 33.4 G/DL (ref 31–36)
MCV RBC AUTO: 92.4 FL (ref 80–100)
MONOCYTES ABSOLUTE: 0.6 K/UL (ref 0–1.3)
MONOCYTES RELATIVE PERCENT: 7.4 %
NEUTROPHILS ABSOLUTE: 5.4 K/UL (ref 1.7–7.7)
NEUTROPHILS RELATIVE PERCENT: 63.2 %
PDW BLD-RTO: 16.1 % (ref 12.4–15.4)
PLATELET # BLD: 244 K/UL (ref 135–450)
PMV BLD AUTO: 7 FL (ref 5–10.5)
POTASSIUM REFLEX MAGNESIUM: 3.8 MMOL/L (ref 3.5–5.1)
POTASSIUM REFLEX MAGNESIUM: 3.9 MMOL/L (ref 3.5–5.1)
RBC # BLD: 3.72 M/UL (ref 4–5.2)
SODIUM BLD-SCNC: 130 MMOL/L (ref 136–145)
SODIUM BLD-SCNC: 131 MMOL/L (ref 136–145)
TROPONIN: 0.03 NG/ML
WBC # BLD: 8.5 K/UL (ref 4–11)

## 2020-05-12 PROCEDURE — 80048 BASIC METABOLIC PNL TOTAL CA: CPT

## 2020-05-12 PROCEDURE — 36415 COLL VENOUS BLD VENIPUNCTURE: CPT

## 2020-05-12 PROCEDURE — 1200000000 HC SEMI PRIVATE

## 2020-05-12 PROCEDURE — 84484 ASSAY OF TROPONIN QUANT: CPT

## 2020-05-12 PROCEDURE — 85025 COMPLETE CBC W/AUTO DIFF WBC: CPT

## 2020-05-12 PROCEDURE — 97162 PT EVAL MOD COMPLEX 30 MIN: CPT

## 2020-05-12 PROCEDURE — 2580000003 HC RX 258: Performed by: INTERNAL MEDICINE

## 2020-05-12 PROCEDURE — 97530 THERAPEUTIC ACTIVITIES: CPT

## 2020-05-12 PROCEDURE — 97165 OT EVAL LOW COMPLEX 30 MIN: CPT

## 2020-05-12 PROCEDURE — 6370000000 HC RX 637 (ALT 250 FOR IP): Performed by: INTERNAL MEDICINE

## 2020-05-12 PROCEDURE — 51798 US URINE CAPACITY MEASURE: CPT

## 2020-05-12 PROCEDURE — 97535 SELF CARE MNGMENT TRAINING: CPT

## 2020-05-12 PROCEDURE — 6360000002 HC RX W HCPCS: Performed by: INTERNAL MEDICINE

## 2020-05-12 RX ADMIN — ASPIRIN 81 MG: 81 TABLET ORAL at 09:40

## 2020-05-12 RX ADMIN — LEVOTHYROXINE SODIUM 50 MCG: 0.05 TABLET ORAL at 05:08

## 2020-05-12 RX ADMIN — ENOXAPARIN SODIUM 30 MG: 30 INJECTION SUBCUTANEOUS at 09:42

## 2020-05-12 RX ADMIN — PANTOPRAZOLE SODIUM 40 MG: 40 TABLET, DELAYED RELEASE ORAL at 05:08

## 2020-05-12 RX ADMIN — METOPROLOL SUCCINATE 100 MG: 50 TABLET, EXTENDED RELEASE ORAL at 09:41

## 2020-05-12 RX ADMIN — LOSARTAN POTASSIUM 100 MG: 100 TABLET, FILM COATED ORAL at 09:41

## 2020-05-12 RX ADMIN — NIFEDIPINE 30 MG: 30 TABLET, FILM COATED, EXTENDED RELEASE ORAL at 20:36

## 2020-05-12 RX ADMIN — CEFTRIAXONE SODIUM 1 G: 1 INJECTION, POWDER, FOR SOLUTION INTRAMUSCULAR; INTRAVENOUS at 12:55

## 2020-05-12 RX ADMIN — TRAMADOL HYDROCHLORIDE 50 MG: 50 TABLET, FILM COATED ORAL at 01:05

## 2020-05-12 ASSESSMENT — PAIN SCALES - GENERAL: PAINLEVEL_OUTOF10: 5

## 2020-05-12 NOTE — PROGRESS NOTES
Spoke to son regarding pt care. Son states he is tired and that it is getting harder to take care of her at home. While he still wants to care for her full time until he can no longer care for her, he is open to increased home care and possible rehab at a facility. Will let discharge planning know.

## 2020-05-12 NOTE — PLAN OF CARE
Pt remained free of falls. Call light within reach. Bed alarm on. Avasys on for safety. 3/4 bed rails up. Non skid footwear in place. Bed locked and in lowest position. Will continue to monitor.

## 2020-05-12 NOTE — PROGRESS NOTES
Assessment completed and documented. VSS. Alert to self and time. Cell phone by window per familys instruction. avasys on for safety and bedcheck on. Attempted to get patient out of bed because she said she had to have a bowel movement, patient unable to bear weight, heavy x2 assist to [facility]. Takes pills whole with water, 1 at a time. purewick in, patient sometimes incont of bladder. Denies pain. Denies further needs at this time. Bed locked and in lowest position. Bedside table and call light within reach. Will continue to monitor.     -[facility] called several times for patient pulling on IV, covered patient IV with board and ace wrap. Tolerating. Will continue to monitor.    -bladder scanned patient= 4 ml. Patient voided last at 1936. abd soft, non distended. purewick in place.

## 2020-05-12 NOTE — PROGRESS NOTES
Meds:   aspirin  81 mg Oral Daily    losartan  100 mg Oral Daily    metoprolol succinate  100 mg Oral Daily    NIFEdipine  30 mg Oral Nightly    pantoprazole  40 mg Oral QAM AC    sodium chloride flush  10 mL Intravenous 2 times per day    enoxaparin  30 mg Subcutaneous Daily    cefTRIAXone (ROCEPHIN) IV  1 g Intravenous Q24H    levothyroxine  50 mcg Oral Daily       Infusions:   sodium chloride 50 mL/hr at 05/11/20 1915       PRN Meds:  ALPRAZolam, traMADol, sodium chloride flush, acetaminophen **OR** acetaminophen, polyethylene glycol, promethazine **OR** ondansetron, hydrALAZINE    Intake and Output     Intake/Output Summary (Last 24 hours) at 5/12/2020 0823  Last data filed at 5/12/2020 0336  Gross per 24 hour   Intake 834 ml   Output --   Net 834 ml       Vitals    /62   Pulse 87   Temp 99.5 °F (37.5 °C) (Oral)   Resp 16   Ht 5' 5\" (1.651 m)   Wt 116 lb (52.6 kg)   SpO2 96%   BMI 19.30 kg/m²     Exam:  General appearance: Well, NAD, appears stated age. Intermittently confused and unable to follow direction. Pleasant, cannot participate in full exam.  HEENT NCAT w/out obvious deformity. PERRL, EOM's intact. Conjunctivae/corneas clear  Neck: Supple, No JVD/Bruits. Trachea midline w/out thyromegaly or adenopathy w/ full ROM  Lungs: Poor ability to auscultate lungs. Diminished airway to bases. CTAB w/out Rales/Wheezes/Rhonchi w/ good respiratory effort appreciated. Heart: RRR w/ Normal S1/S2 w/out  M/R/G, PMI non-displaced  Vascular: no lower extremity peripheral edema and venous stasis, no calf tenderness, negative Madeleine's sign, no calf cords  Abdomen: soft, NT/ND w/out R/G and +BSx4  Extremities: No C/C/E Bilaterally. Full ROM w/ out deformity   Skin: Skin color, texture, turgor normal.  Multiple ecchymotic bruises to shins bilaterally, hands bilaterally, left forearm. .  Neurologic: Alert and oriented X 1, person.,  NVI w/ sensory/motor intact UE/LE Bilaterally.    Mental status:

## 2020-05-12 NOTE — CARE COORDINATION
CASE MANAGEMENT INITIAL ASSESSMENT      Reviewed chart and completed assessment via telephone with: patients zuleika Simmons  Explained Case Management role/services. Primary contact information: satinder miranda    Admit date/status: 5/11/20  Diagnosis: metabolic encephalopathy  Is this a Readmission?:  n    Insurance: [place] [id number] required for SNF - waived currently      3 night stay required - N    Living arrangements, Adls, care needs, prior to admission: lives in home with son. 2 step entry. Transportation: likely squad    [address] at home: Walker__Cane__RTS__ BSC_x_Shower Chair__  02__ HHN__ CPAP__  BiPap__  Hospital Bed_x_ W/C___ Other__________    Services in the home and/or outpatient, prior to admission: nurse weekly checks in. Dialysis Facility (if applicable) none  · Name:  · Address:  · Dialysis Schedule:  · Phone:  · Fax:    PT/OT recs: none    Hospital Exemption Notification (HEN): needed for SNF    Barriers to discharge: none    Plan/comments: spoke with patients son, [address] [place] [address]. Reported patient lives with him in home. 2 step entry. Stated he is caregiver for mom. Provides all care. Lifts patient out of bed onto commode and lifts back into bed. Patient hasn't been out of bed in 2 months. Stated had [facility] at one point but they stopped coming when patient could no longer participate in therapy. Discussed, possibility of private duty assistance to help with care. Reported she would not allow him to pay for it. Stated will need squad transportation to get her home. Discussed possibility of [facility], declined. Stated he is fine bringing her home with him and providing all services.  Cate Sanchez RN      ECOC on chart for MD signature

## 2020-05-12 NOTE — PLAN OF CARE
Pt remained free from falls during shift. Pt has skid resistant footwear on feet. Pt is alert and oriented, but has confusion and does not answer questions correctly. Pt was unable to do anything beyond sitting up with assistance with PT/OT. Pt has AVASYS and is located outside the nurses station. Pt has call light and bedside table within reach. Pt is checked on every hour by staff.

## 2020-05-13 LAB
ANION GAP SERPL CALCULATED.3IONS-SCNC: 11 MMOL/L (ref 3–16)
ANION GAP SERPL CALCULATED.3IONS-SCNC: 9 MMOL/L (ref 3–16)
BUN BLDV-MCNC: 15 MG/DL (ref 7–20)
BUN BLDV-MCNC: 19 MG/DL (ref 7–20)
CALCIUM SERPL-MCNC: 8.5 MG/DL (ref 8.3–10.6)
CALCIUM SERPL-MCNC: 8.6 MG/DL (ref 8.3–10.6)
CHLORIDE BLD-SCNC: 91 MMOL/L (ref 99–110)
CHLORIDE BLD-SCNC: 96 MMOL/L (ref 99–110)
CO2: 25 MMOL/L (ref 21–32)
CO2: 26 MMOL/L (ref 21–32)
CREAT SERPL-MCNC: 0.8 MG/DL (ref 0.6–1.2)
CREAT SERPL-MCNC: 1 MG/DL (ref 0.6–1.2)
GFR AFRICAN AMERICAN: >60
GFR AFRICAN AMERICAN: >60
GFR NON-AFRICAN AMERICAN: 53
GFR NON-AFRICAN AMERICAN: >60
GLUCOSE BLD-MCNC: 108 MG/DL (ref 70–99)
GLUCOSE BLD-MCNC: 125 MG/DL (ref 70–99)
LV EF: 58 %
LVEF MODALITY: NORMAL
POTASSIUM REFLEX MAGNESIUM: 3.6 MMOL/L (ref 3.5–5.1)
POTASSIUM REFLEX MAGNESIUM: 3.6 MMOL/L (ref 3.5–5.1)
SODIUM BLD-SCNC: 127 MMOL/L (ref 136–145)
SODIUM BLD-SCNC: 131 MMOL/L (ref 136–145)

## 2020-05-13 PROCEDURE — 93306 TTE W/DOPPLER COMPLETE: CPT

## 2020-05-13 PROCEDURE — 6360000002 HC RX W HCPCS: Performed by: INTERNAL MEDICINE

## 2020-05-13 PROCEDURE — 2580000003 HC RX 258: Performed by: INTERNAL MEDICINE

## 2020-05-13 PROCEDURE — 6370000000 HC RX 637 (ALT 250 FOR IP): Performed by: INTERNAL MEDICINE

## 2020-05-13 PROCEDURE — 36415 COLL VENOUS BLD VENIPUNCTURE: CPT

## 2020-05-13 PROCEDURE — 1200000000 HC SEMI PRIVATE

## 2020-05-13 PROCEDURE — 80048 BASIC METABOLIC PNL TOTAL CA: CPT

## 2020-05-13 RX ORDER — HYDRALAZINE HYDROCHLORIDE 100 MG/1
100 TABLET, FILM COATED ORAL 3 TIMES DAILY
Status: ON HOLD | COMMUNITY
End: 2020-05-14 | Stop reason: SDUPTHER

## 2020-05-13 RX ORDER — LEVOTHYROXINE SODIUM 0.03 MG/1
25 TABLET ORAL DAILY
Status: ON HOLD | COMMUNITY
End: 2020-05-14 | Stop reason: SDUPTHER

## 2020-05-13 RX ADMIN — ASPIRIN 81 MG: 81 TABLET ORAL at 08:11

## 2020-05-13 RX ADMIN — PANTOPRAZOLE SODIUM 40 MG: 40 TABLET, DELAYED RELEASE ORAL at 05:52

## 2020-05-13 RX ADMIN — ALPRAZOLAM 0.25 MG: 0.25 TABLET ORAL at 23:38

## 2020-05-13 RX ADMIN — SODIUM CHLORIDE: 9 INJECTION, SOLUTION INTRAVENOUS at 08:11

## 2020-05-13 RX ADMIN — ENOXAPARIN SODIUM 30 MG: 30 INJECTION SUBCUTANEOUS at 08:11

## 2020-05-13 RX ADMIN — LEVOTHYROXINE SODIUM 50 MCG: 0.05 TABLET ORAL at 05:52

## 2020-05-13 RX ADMIN — Medication 10 ML: at 08:11

## 2020-05-13 RX ADMIN — NIFEDIPINE 30 MG: 30 TABLET, FILM COATED, EXTENDED RELEASE ORAL at 20:42

## 2020-05-13 RX ADMIN — CEFTRIAXONE SODIUM 1 G: 1 INJECTION, POWDER, FOR SOLUTION INTRAMUSCULAR; INTRAVENOUS at 11:39

## 2020-05-13 NOTE — PLAN OF CARE
Problem: Falls - Risk of:  Goal: Will remain free from falls  Description: Will remain free from falls  Outcome: Ongoing     Problem: Falls - Risk of:  Goal: Absence of physical injury  Description: Absence of physical injury  Outcome: Ongoing     Problem: Injury - Risk of, Physical Injury:  Goal: Will remain free from falls  Description: Will remain free from falls  Outcome: Ongoing     Problem: Injury - Risk of, Physical Injury:  Goal: Absence of physical injury  Description: Absence of physical injury  Outcome: Ongoing     Problem: Falls - Risk of: Intervention: Assess risk factors for falls  Note: Pt is high fall risk  Intervention: Postfall assessment  Note: No falls sustained thus far this shift  Intervention: Manage a safe environment  Note: Call light within reach. Bed side table within reach. Wheels locked. Bed in lowest position. Bed check in place. Avasys in use. Pt instructed to call out for assistance. Pt expressed understanding; reinforcement needed. Intervention: Toileting assistance  Note: Pt incont; alfa care per staff       Problem: Injury - Risk of, Physical Injury:  Intervention: Manage a safe environment  Note: Call light within reach. Bed side table within reach. Wheels locked. Bed in lowest position. Bed check in place. Avasys in use. Pt instructed to call out for assistance. Pt expressed understanding; reinforcement needed.     Intervention: Toileting assistance  Note: Pt incont; alfa care per staff

## 2020-05-13 NOTE — PROGRESS NOTES
Hospitalist Progress Note      PCP: Lissy Ford MD    Date of Admission: 5/11/2020    Chief Complaint on Admission: Altered mental status    History Of Present Illness: The patient is a 80 y.o. female who presents to [facility] with increasing confusion over the past 2 days. Patient is brought in by son who she lives with at home. Presently he is not available for history and interview. Patient currently is confused and is a poor historian. Information gathered from EMR review and chart review.     Per ED note, patient was brought in by the son who states over the past 2 days he notes his mother having increasing confusion. He was very concerned regarding this change. It is unknown what her baseline function is, although she does have a diagnosis of cognitive impairment. Son brought in a list of medications for her home regimen, and reported that she takes these medications on a daily basis. He does not offer any new signs or symptoms that may be concerning, or etiology of her confusion    Pt Seen/Examined and Chart Reviewed. Admitting dx:  Acute metabolic encephalopathy    SUBJECTIVE:   Patient continued to have intermittent confusion episodes. At times she is coherent and able to answer questions appropriately. At other times she is not able to answer things and is tangential in her thinking. Nursing reports speaking to son who cares for her and states there is confusion regarding her medication and regimen. Son states she has been taking her medications but is unclear if they are the correct dosings. Thyroid medication recently has been adjusted by her primary care physician. Laboratory evaluation supports gross hypothyroidism with TSH greater than 50. No new medical complaints  Bedside rounding with nursing completed    OBJECTIVE:     Allergies  Amlodipine besylate; Demerol hcl [meperidine]; Hydrochlorothiazide;  Lisinopril; and Meperidine hcl    Medications      Scheduled Meds:   aspirin  81 mg Oral Daily    losartan  100 mg Oral Daily    metoprolol succinate  100 mg Oral Daily    NIFEdipine  30 mg Oral Nightly    pantoprazole  40 mg Oral QAM AC    sodium chloride flush  10 mL Intravenous 2 times per day    enoxaparin  30 mg Subcutaneous Daily    cefTRIAXone (ROCEPHIN) IV  1 g Intravenous Q24H    levothyroxine  50 mcg Oral Daily       Infusions:   sodium chloride 50 mL/hr at 05/13/20 0811       PRN Meds:  ALPRAZolam, traMADol, sodium chloride flush, acetaminophen **OR** acetaminophen, polyethylene glycol, promethazine **OR** ondansetron, hydrALAZINE    Intake and Output     Intake/Output Summary (Last 24 hours) at 5/13/2020 0847  Last data filed at 5/13/2020 0811  Gross per 24 hour   Intake 750 ml   Output --   Net 750 ml       Vitals    BP (!) 113/59   Pulse 52   Temp 98.3 °F (36.8 °C) (Axillary)   Resp 20   Ht 5' 5\" (1.651 m)   Wt 116 lb (52.6 kg)   SpO2 96%   BMI 19.30 kg/m²     Exam:  General appearance: Well, NAD, appears stated age. Intermittently confused and unable to follow direction. Pleasant, cannot participate in full exam.  HEENT NCAT w/out obvious deformity. PERRL, EOM's intact. Conjunctivae/corneas clear  Neck: Supple, No JVD/Bruits. Trachea midline w/out thyromegaly or adenopathy w/ full ROM  Lungs: Poor ability to auscultate lungs. Diminished airway to bases. CTAB w/out Rales/Wheezes/Rhonchi w/ good respiratory effort appreciated. Heart: RRR w/ Normal S1/S2 w/out  M/R/G, PMI non-displaced  Vascular: no lower extremity peripheral edema and venous stasis, no calf tenderness, negative Madeleine's sign, no calf cords  Abdomen: soft, NT/ND w/out R/G and +BSx4  Extremities: No C/C/E Bilaterally. Full ROM w/ out deformity   Skin: Skin color, texture, turgor normal.  Multiple ecchymotic bruises to shins bilaterally, hands bilaterally, left forearm. .  Neurologic: Alert and oriented X 1, person.,  NVI w/ sensory/motor intact UE/LE Bilaterally.    Mental follow-up with outpatient PCP for laboratory evaluation and medication titration     Essential hypertension with no known coronary artery disease: Stable and well-controlled on home regimen.  -Medications: Aspirin, losartan, Toprol, nifedipine  -Continue to monitor blood pressure curves  -Continue to follow-up with outpatient PCP for monitoring and titration of antihypertensives    Elevated Troponins: Flat baseline 0.03. Patient states no evidence of chest pain. EKG with septal infarct age undetermined. No acute ischemic changes. ECHO without RWM abnormalities. Cont to monitor patient.  - ECHO 5/13/2020   Normal left ventricular systolic function with ejection fraction of 55-60%.   No regional wall motion abnormalites are seen.   Grade I diastolic dysfunction with normal filling pressure.   Mild mitral regurgitation.   Systolic pulmonic artery pressure (SPAP) is normal estimated at 25 mmHg   (Right atrial pressure of 3 mmHg).    - Labs: Troponin  - EKG evaluation      GERD: Stable without any red alarm symptoms  -Medications: Protonix  -Continue to monitor and evaluate patient        DVT Prophylaxis: Lovenox  Diet: general diet  Code Status: Prior  PT/OT Eval Status: Ordered     Dispo -expect 1-2 days pending clinical response medical therapy         Gayle Eagle MD

## 2020-05-13 NOTE — PROGRESS NOTES
Pt A&Ox4, [place]. VSS. Denies pain/discomfort. Denies dyspnea. Denies N/V; BS active x4; passing flatus. Coccyx red/blancable; no mepilex d/t incont. Call light within reach. Bed side table within reach. Wheels locked. Bed in lowest position. Bed check in place. Avasys in use. Nneka Jackson Pt instructed to call out for assistance. Pt expressed understanding; no evidence of learning; reinforcement needed. Shift assessment complete. All care cont per orders. Will cont to monitor.    Electronically signed by Colton Aquino RN on 5/13/2020 at 10:22 AM

## 2020-05-14 VITALS
SYSTOLIC BLOOD PRESSURE: 148 MMHG | BODY MASS INDEX: 19.33 KG/M2 | RESPIRATION RATE: 16 BRPM | WEIGHT: 116 LBS | DIASTOLIC BLOOD PRESSURE: 78 MMHG | OXYGEN SATURATION: 97 % | HEART RATE: 57 BPM | TEMPERATURE: 97.9 F | HEIGHT: 65 IN

## 2020-05-14 LAB
ANION GAP SERPL CALCULATED.3IONS-SCNC: 11 MMOL/L (ref 3–16)
BUN BLDV-MCNC: 15 MG/DL (ref 7–20)
CALCIUM SERPL-MCNC: 8.4 MG/DL (ref 8.3–10.6)
CHLORIDE BLD-SCNC: 96 MMOL/L (ref 99–110)
CO2: 24 MMOL/L (ref 21–32)
CREAT SERPL-MCNC: 0.8 MG/DL (ref 0.6–1.2)
GFR AFRICAN AMERICAN: >60
GFR NON-AFRICAN AMERICAN: >60
GLUCOSE BLD-MCNC: 108 MG/DL (ref 70–99)
MAGNESIUM: 1.9 MG/DL (ref 1.8–2.4)
POTASSIUM REFLEX MAGNESIUM: 3.3 MMOL/L (ref 3.5–5.1)
SODIUM BLD-SCNC: 131 MMOL/L (ref 136–145)

## 2020-05-14 PROCEDURE — 6360000002 HC RX W HCPCS: Performed by: INTERNAL MEDICINE

## 2020-05-14 PROCEDURE — 6370000000 HC RX 637 (ALT 250 FOR IP): Performed by: INTERNAL MEDICINE

## 2020-05-14 PROCEDURE — 80048 BASIC METABOLIC PNL TOTAL CA: CPT

## 2020-05-14 PROCEDURE — 83735 ASSAY OF MAGNESIUM: CPT

## 2020-05-14 PROCEDURE — 36415 COLL VENOUS BLD VENIPUNCTURE: CPT

## 2020-05-14 RX ORDER — POLYETHYLENE GLYCOL 3350 17 G/17G
17 POWDER, FOR SOLUTION ORAL DAILY PRN
Qty: 527 G | Refills: 1 | Status: SHIPPED | OUTPATIENT
Start: 2020-05-14 | End: 2020-06-13

## 2020-05-14 RX ORDER — HYDRALAZINE HYDROCHLORIDE 100 MG/1
100 TABLET, FILM COATED ORAL 3 TIMES DAILY PRN
Qty: 60 TABLET | Refills: 3
Start: 2020-05-14

## 2020-05-14 RX ORDER — POTASSIUM CHLORIDE 20 MEQ/1
40 TABLET, EXTENDED RELEASE ORAL ONCE
Status: COMPLETED | OUTPATIENT
Start: 2020-05-14 | End: 2020-05-14

## 2020-05-14 RX ORDER — LEVOTHYROXINE SODIUM 0.03 MG/1
50 TABLET ORAL DAILY
Qty: 30 TABLET | Refills: 0 | Status: SHIPPED | OUTPATIENT
Start: 2020-05-14

## 2020-05-14 RX ADMIN — LEVOTHYROXINE SODIUM 50 MCG: 0.05 TABLET ORAL at 05:12

## 2020-05-14 RX ADMIN — METOPROLOL SUCCINATE 100 MG: 50 TABLET, EXTENDED RELEASE ORAL at 08:44

## 2020-05-14 RX ADMIN — POTASSIUM CHLORIDE 40 MEQ: 20 TABLET, EXTENDED RELEASE ORAL at 08:29

## 2020-05-14 RX ADMIN — ENOXAPARIN SODIUM 30 MG: 30 INJECTION SUBCUTANEOUS at 08:29

## 2020-05-14 RX ADMIN — ASPIRIN 81 MG: 81 TABLET ORAL at 08:29

## 2020-05-14 RX ADMIN — LOSARTAN POTASSIUM 100 MG: 100 TABLET, FILM COATED ORAL at 08:29

## 2020-05-14 RX ADMIN — PANTOPRAZOLE SODIUM 40 MG: 40 TABLET, DELAYED RELEASE ORAL at 05:12

## 2020-05-14 NOTE — PROGRESS NOTES
Hospitalist Progress Note      PCP: María Herrera MD    Date of Admission: 5/11/2020    Chief Complaint on Admission: Altered mental status    History Of Present Illness: The patient is a 80 y.o. female who presents to [facility] with increasing confusion over the past 2 days. Patient is brought in by son who she lives with at home. Presently he is not available for history and interview. Patient currently is confused and is a poor historian. Information gathered from EMR review and chart review.     Per ED note, patient was brought in by the son who states over the past 2 days he notes his mother having increasing confusion. He was very concerned regarding this change. It is unknown what her baseline function is, although she does have a diagnosis of cognitive impairment. Son brought in a list of medications for her home regimen, and reported that she takes these medications on a daily basis. He does not offer any new signs or symptoms that may be concerning, or etiology of her confusion    Pt Seen/Examined and Chart Reviewed. Admitting dx:  Acute metabolic encephalopathy    SUBJECTIVE:   Patient continued to have intermittent confusion episodes. At times she is coherent and able to answer questions appropriately. At other times she is not able to answer things and is tangential in her thinking. Nursing reports speaking to son who cares for her and states there is confusion regarding her medication and regimen. Son states she has been taking her medications but is unclear if they are the correct dosings. Thyroid medication recently has been adjusted by her primary care physician. Laboratory evaluation supports gross hypothyroidism with TSH greater than 50. No new medical complaints  Bedside rounding with nursing completed    OBJECTIVE:     Allergies  Amlodipine besylate; Demerol hcl [meperidine]; Hydrochlorothiazide;  Lisinopril; and Meperidine hcl    Medications      Scheduled

## 2020-05-14 NOTE — DISCHARGE SUMMARY
Hospital Medicine Discharge Summary    Patient ID: Crissy Durbin      Patient's PCP: Raymundo Bojorquez MD    Admit Date: 5/11/2020     Discharge Date:   5/14/2020    Admitting Physician: Bear Flores MD     Discharge Physician: Bj Pena MD     Discharge Diagnoses: Active Hospital Problems    Diagnosis    Acute metabolic encephalopathy [F89.71]    Hypothyroid [E03.9]    Hyponatremia [E87.1]    Essential hypertension [I10]       The patient was seen and examined on day of discharge and this discharge summary is in conjunction with any daily progress note from day of discharge. Hospital Course: The patient is an 80 y. o. female who presents to [facility] with increasing confusion over the past 2 days. Yokasta Whalen is brought in by son who she lives with at home.  Presently he is not available for history and interview. Yokasta Whalen currently is confused and is a poor historian.  Information gathered from EMR review and chart review.     Per ED note, patient was brought in by the son who states over the past 2 days he notes his mother having increasing confusion. Radames Huffman was very concerned regarding this change.  It is unknown what her baseline function is, although she does have a diagnosis of cognitive impairment.  Son brought in a list of medications for her home regimen, and reported that she takes these medications on a daily basis. Radames Huffman does not offer any new signs or symptoms that may be concerning, or etiology of her confusion     Patient was placed to the Hospital service for further medical care and evaluation    Physical Exam Performed:     BP (!) 148/78   Pulse 57   Temp 97.9 °F (36.6 °C) (Oral)   Resp 16   Ht 5' 5\" (1.651 m)   Wt 116 lb (52.6 kg)   SpO2 97%   BMI 19.30 kg/m²   General appearance: Well, NAD, appears stated age.  Mentation back to baseline. Intermittent confusion. Easily verbally redirected. HEENT NCAT w/out obvious deformity. PERRL, EOM's intact. periventricular and scattered   frontal parietal white matter disease, likely due to small-vessel ischemic   change      Asymmetric lucency right frontal bone appears similar         XR CHEST PORTABLE   Final Result   Pleural or parenchymal opacification at the right lateral lung base. Otherwise unremarkable chest.  Consider follow-up PA and lateral chest with   better inspiratory effort when patient condition permits. ASSESSMENT AND PLAN            Active Hospital Problems     Diagnosis Date Noted    Acute metabolic encephalopathy [A86.19] 12/23/2019    Hypothyroid [E03.9] 02/20/2017    Hyponatremia [E87.1] 02/20/2017    Essential hypertension [I10] 06/20/2016      ASSESSMENT/PLAN:  Acute metabolic encephalopathy secondary to:  hypothyroidism, hyponatremia or benzodiazepine use.  Stable.   Mentation back to baseline.   -Labs: TSH  54, free T4 1.2  -Optimize electrolytes  -Micro: UA, urine culture no indication for reflex clx based on UA, Blood culture ordered 5/11 NGTD  -Abx: Discontinued in  hospital  -Meds: Maintain Xanax 0.25 mg as needed at nighttime.  Close monitoring for altered mental status while using at home.      Hyponatremia: Na 129 POA.  Baseline sodium 131-134  -IV fluids: Normal saline at 50 cc/h completed  -Labs: BMP monitoring as outpt as clinically warranted.      Hypothyroidism: Uncontrolled.   -Labs: TSH 54.07., free T4 1.2  -Medications: Patient per review was 100 mcg of levothyroxine, patient was recently decreased to 25 mcg.  Will restart at 50 mcg and evaluate thyroid function tests for appropriate titration.  -Continue to follow-up with outpatient PCP for laboratory evaluation and medication titration     Essential hypertension with no known coronary artery disease: Stable and well-controlled on home regimen.  -Medications: Aspirin, losartan, Toprol, nifedipine, hydralazine  -Continue to monitor blood pressure curves  -Continue to follow-up with outpatient PCP for monitoring and titration of antihypertensives     Elevated Troponins: Flat baseline 0.03. Patient states no evidence of chest pain. EKG with septal infarct age undetermined. No acute ischemic changes. ECHO without RWM abnormalities. Cont to monitor patient.  - ECHO 5/13/2020   Normal left ventricular systolic function with ejection fraction of 55-60%.   No regional wall motion abnormalites are seen.   Grade I diastolic dysfunction with normal filling pressure.   Mild mitral regurgitation.   Systolic pulmonic artery pressure (SPAP) is normal estimated at 25 mmHg   (Right atrial pressure of 3 mmHg). - Labs: Troponin  - EKG evaluation      GERD: Stable without any red alarm symptoms  -Medications: Prilosec  -Continue to monitor and evaluate patient     Hypokalemia  -Replete orally  -Labs: BMP monitoring  - Encourage good nutrition     Consults:     IP CONSULT TO HOSPITALIST    Disposition:  Home with home care    Condition at Discharge: Stable    Discharge Instructions/Follow-up:  PCP 1 week,     Code Status:  Full Code     Activity: activity as tolerated    Diet: Encourage as tolerated      Discharge Medications:     Current Discharge Medication List           Details   polyethylene glycol (GLYCOLAX) 17 g packet Take 17 g by mouth daily as needed for Constipation  Qty: 527 g, Refills: 1              Details   hydrALAZINE (APRESOLINE) 100 MG tablet Take 1 tablet by mouth 3 times daily as needed (SBP > 160 mmHg)  Qty: 60 tablet, Refills: 3      levothyroxine (SYNTHROID) 25 MCG tablet Take 2 tablets by mouth Daily  Qty: 30 tablet, Refills: 0              Details   traMADol (ULTRAM) 50 MG tablet Take 50 mg by mouth every 6 hours as needed for Pain. ALPRAZolam (XANAX) 0.25 MG tablet Take 0.25 mg by mouth nightly as needed for Sleep.       aspirin 81 MG EC tablet Take 81 mg by mouth      losartan (COZAAR) 100 MG tablet TAKE 1 TABLET EVERY DAY  Qty: 90 tablet, Refills: 1      metoprolol succinate (TOPROL XL) 100 MG extended

## 2020-05-14 NOTE — CARE COORDINATION
Discharge order noted. Transport to be set up. MAYCO spoke with son, David Lindo, who states there are 2 steps into the home. No preference for ambulance agency.      Mohsen Byrd RN CM

## 2020-05-15 ENCOUNTER — CARE COORDINATION (OUTPATIENT)
Dept: CASE MANAGEMENT | Age: 85
End: 2020-05-15

## 2020-05-15 LAB
BLOOD CULTURE, ROUTINE: NORMAL
CULTURE, BLOOD 2: NORMAL

## 2020-05-15 NOTE — CARE COORDINATION
medications. Akin Sandhu placed call to PCP to discuss synthroid dose and also discuss resuming Amiodarone which was noted to stop at discharge. Akin Sandhu denied patient having any issues with CP, SOB, or fever. Akin Sandhu also stated patient is urinating without difficulty. Akin Sandhu stated it is hard for him to get patient out of the home to follow up apts d/t her weakened status. CTN discussed options of visiting physicians and provided patient information. Patient is also active with a [employer] nurse. CTN contacted LENNY Arias RN with [facility] hospitalist and discussed the amiodarone and LENNY Arias advised patient's son to discuss with patient's cardiologist regarding resumption of Amiodarone. CTN contacted Akin Sandhu back and he reported he will follow up with patient's providers and [facility] nurse will also clarify. Denies any acute needs at present time. Agreeable to f/u calls. Educated on the use of urgent care or physicians 24 hr access line if assistance is needed after hours.      Gagan HOOKN, RN, [facility]  Care Transition Nurse   [phone]

## 2020-05-20 ENCOUNTER — CARE COORDINATION (OUTPATIENT)
Dept: CASE MANAGEMENT | Age: 85
End: 2020-05-20

## 2020-07-01 ENCOUNTER — HOSPITAL ENCOUNTER (OUTPATIENT)
Age: 85
Setting detail: SPECIMEN
Discharge: HOME | End: 2020-07-01
Payer: MEDICARE

## 2020-07-01 LAB
A/G RATIO: 1.3 (ref 1.1–2.2)
ALBUMIN SERPL-MCNC: 3.2 G/DL (ref 3.4–5)
ALP BLD-CCNC: 111 U/L (ref 40–129)
ALT SERPL-CCNC: 11 U/L (ref 10–40)
ANION GAP SERPL CALCULATED.3IONS-SCNC: 10 MMOL/L (ref 3–16)
AST SERPL-CCNC: 13 U/L (ref 15–37)
BASOPHILS ABSOLUTE: 0.1 K/UL (ref 0–0.2)
BASOPHILS RELATIVE PERCENT: 0.9 %
BILIRUB SERPL-MCNC: 0.4 MG/DL (ref 0–1)
BUN BLDV-MCNC: 15 MG/DL (ref 7–20)
CALCIUM SERPL-MCNC: 8.7 MG/DL (ref 8.3–10.6)
CHLORIDE BLD-SCNC: 95 MMOL/L (ref 99–110)
CO2: 27 MMOL/L (ref 21–32)
CREAT SERPL-MCNC: 0.9 MG/DL (ref 0.6–1.2)
EOSINOPHILS ABSOLUTE: 0.9 K/UL (ref 0–0.6)
EOSINOPHILS RELATIVE PERCENT: 9.9 %
GFR AFRICAN AMERICAN: >60
GFR NON-AFRICAN AMERICAN: 59
GLOBULIN: 2.5 G/DL
GLUCOSE BLD-MCNC: 158 MG/DL (ref 70–99)
HCT VFR BLD CALC: 35.9 % (ref 36–48)
HEMOGLOBIN: 12 G/DL (ref 12–16)
LYMPHOCYTES ABSOLUTE: 1.9 K/UL (ref 1–5.1)
LYMPHOCYTES RELATIVE PERCENT: 21.3 %
MCH RBC QN AUTO: 31.6 PG (ref 26–34)
MCHC RBC AUTO-ENTMCNC: 33.3 G/DL (ref 31–36)
MCV RBC AUTO: 95 FL (ref 80–100)
MONOCYTES ABSOLUTE: 0.6 K/UL (ref 0–1.3)
MONOCYTES RELATIVE PERCENT: 6.4 %
NEUTROPHILS ABSOLUTE: 5.4 K/UL (ref 1.7–7.7)
NEUTROPHILS RELATIVE PERCENT: 61.5 %
PDW BLD-RTO: 15.7 % (ref 12.4–15.4)
PLATELET # BLD: 276 K/UL (ref 135–450)
PMV BLD AUTO: 7.3 FL (ref 5–10.5)
POTASSIUM SERPL-SCNC: 3.8 MMOL/L (ref 3.5–5.1)
RBC # BLD: 3.78 M/UL (ref 4–5.2)
SODIUM BLD-SCNC: 132 MMOL/L (ref 136–145)
T4 FREE: 1 NG/DL (ref 0.9–1.8)
TOTAL PROTEIN: 5.7 G/DL (ref 6.4–8.2)
TSH SERPL DL<=0.05 MIU/L-ACNC: 75.3 UIU/ML (ref 0.27–4.2)
WBC # BLD: 8.7 K/UL (ref 4–11)

## 2020-07-01 PROCEDURE — 84443 ASSAY THYROID STIM HORMONE: CPT

## 2020-07-01 PROCEDURE — 36415 COLL VENOUS BLD VENIPUNCTURE: CPT

## 2020-07-01 PROCEDURE — 85025 COMPLETE CBC W/AUTO DIFF WBC: CPT

## 2020-07-01 PROCEDURE — 84439 ASSAY OF FREE THYROXINE: CPT

## 2020-07-01 PROCEDURE — 80053 COMPREHEN METABOLIC PANEL: CPT

## 2020-10-30 ENCOUNTER — APPOINTMENT (OUTPATIENT)
Dept: CT IMAGING | Age: 85
DRG: 885 | End: 2020-10-30
Payer: MEDICARE

## 2020-10-30 ENCOUNTER — APPOINTMENT (OUTPATIENT)
Dept: GENERAL RADIOLOGY | Age: 85
DRG: 885 | End: 2020-10-30
Payer: MEDICARE

## 2020-10-30 ENCOUNTER — HOSPITAL ENCOUNTER (INPATIENT)
Age: 85
LOS: 10 days | Discharge: HOME HEALTH | DRG: 885 | End: 2020-11-09
Attending: EMERGENCY MEDICINE | Admitting: PSYCHIATRY & NEUROLOGY
Payer: MEDICARE

## 2020-10-30 PROBLEM — F29 PSYCHOSIS (HCC): Status: ACTIVE | Noted: 2020-10-30

## 2020-10-30 LAB
A/G RATIO: 1.3 (ref 1.1–2.2)
ALBUMIN SERPL-MCNC: 3.4 G/DL (ref 3.4–5)
ALP BLD-CCNC: 100 U/L (ref 40–129)
ALT SERPL-CCNC: 9 U/L (ref 10–40)
ANION GAP SERPL CALCULATED.3IONS-SCNC: 9 MMOL/L (ref 3–16)
APTT: 28.5 SEC (ref 24.2–36.2)
AST SERPL-CCNC: 13 U/L (ref 15–37)
BACTERIA: ABNORMAL /HPF
BASOPHILS ABSOLUTE: 0.1 K/UL (ref 0–0.2)
BASOPHILS RELATIVE PERCENT: 0.8 %
BILIRUB SERPL-MCNC: 0.4 MG/DL (ref 0–1)
BILIRUBIN URINE: NEGATIVE
BLOOD, URINE: ABNORMAL
BUN BLDV-MCNC: 24 MG/DL (ref 7–20)
CALCIUM SERPL-MCNC: 9.1 MG/DL (ref 8.3–10.6)
CHLORIDE BLD-SCNC: 96 MMOL/L (ref 99–110)
CLARITY: ABNORMAL
CO2: 27 MMOL/L (ref 21–32)
COLOR: YELLOW
CREAT SERPL-MCNC: 0.7 MG/DL (ref 0.6–1.2)
EKG ATRIAL RATE: 58 BPM
EKG DIAGNOSIS: NORMAL
EKG P AXIS: 45 DEGREES
EKG P-R INTERVAL: 178 MS
EKG Q-T INTERVAL: 432 MS
EKG QRS DURATION: 78 MS
EKG QTC CALCULATION (BAZETT): 424 MS
EKG R AXIS: 49 DEGREES
EKG T AXIS: 63 DEGREES
EKG VENTRICULAR RATE: 58 BPM
EOSINOPHILS ABSOLUTE: 1.4 K/UL (ref 0–0.6)
EOSINOPHILS RELATIVE PERCENT: 11.7 %
GFR AFRICAN AMERICAN: >60
GFR NON-AFRICAN AMERICAN: >60
GLOBULIN: 2.7 G/DL
GLUCOSE BLD-MCNC: 119 MG/DL (ref 70–99)
GLUCOSE URINE: NEGATIVE MG/DL
HCT VFR BLD CALC: 38.1 % (ref 36–48)
HEMOGLOBIN: 12.7 G/DL (ref 12–16)
INR BLD: 0.97 (ref 0.86–1.14)
KETONES, URINE: NEGATIVE MG/DL
LEUKOCYTE ESTERASE, URINE: NEGATIVE
LYMPHOCYTES ABSOLUTE: 2.2 K/UL (ref 1–5.1)
LYMPHOCYTES RELATIVE PERCENT: 18.2 %
MCH RBC QN AUTO: 31.5 PG (ref 26–34)
MCHC RBC AUTO-ENTMCNC: 33.3 G/DL (ref 31–36)
MCV RBC AUTO: 94.6 FL (ref 80–100)
MICROSCOPIC EXAMINATION: YES
MONOCYTES ABSOLUTE: 0.7 K/UL (ref 0–1.3)
MONOCYTES RELATIVE PERCENT: 5.8 %
NEUTROPHILS ABSOLUTE: 7.8 K/UL (ref 1.7–7.7)
NEUTROPHILS RELATIVE PERCENT: 63.5 %
NITRITE, URINE: NEGATIVE
PDW BLD-RTO: 14.5 % (ref 12.4–15.4)
PH UA: 7 (ref 5–8)
PLATELET # BLD: 311 K/UL (ref 135–450)
PMV BLD AUTO: 7.7 FL (ref 5–10.5)
POTASSIUM REFLEX MAGNESIUM: 4.4 MMOL/L (ref 3.5–5.1)
PROTEIN UA: NEGATIVE MG/DL
PROTHROMBIN TIME: 11.2 SEC (ref 10–13.2)
RBC # BLD: 4.03 M/UL (ref 4–5.2)
RBC UA: ABNORMAL /HPF (ref 0–4)
SARS-COV-2, NAAT: NOT DETECTED
SODIUM BLD-SCNC: 132 MMOL/L (ref 136–145)
SPECIFIC GRAVITY UA: 1.02 (ref 1–1.03)
T3 FREE: 1.4 PG/ML (ref 2.3–4.2)
TOTAL PROTEIN: 6.1 G/DL (ref 6.4–8.2)
TSH SERPL DL<=0.05 MIU/L-ACNC: 66.8 UIU/ML (ref 0.27–4.2)
URINE REFLEX TO CULTURE: ABNORMAL
URINE TYPE: ABNORMAL
UROBILINOGEN, URINE: 1 E.U./DL
WBC # BLD: 12.3 K/UL (ref 4–11)
WBC UA: ABNORMAL /HPF (ref 0–5)

## 2020-10-30 PROCEDURE — 99285 EMERGENCY DEPT VISIT HI MDM: CPT

## 2020-10-30 PROCEDURE — 6360000002 HC RX W HCPCS: Performed by: STUDENT IN AN ORGANIZED HEALTH CARE EDUCATION/TRAINING PROGRAM

## 2020-10-30 PROCEDURE — 6370000000 HC RX 637 (ALT 250 FOR IP): Performed by: STUDENT IN AN ORGANIZED HEALTH CARE EDUCATION/TRAINING PROGRAM

## 2020-10-30 PROCEDURE — P9612 CATHETERIZE FOR URINE SPEC: HCPCS

## 2020-10-30 PROCEDURE — 70450 CT HEAD/BRAIN W/O DYE: CPT

## 2020-10-30 PROCEDURE — 93010 ELECTROCARDIOGRAM REPORT: CPT | Performed by: INTERNAL MEDICINE

## 2020-10-30 PROCEDURE — U0002 COVID-19 LAB TEST NON-CDC: HCPCS

## 2020-10-30 PROCEDURE — 81001 URINALYSIS AUTO W/SCOPE: CPT

## 2020-10-30 PROCEDURE — 80053 COMPREHEN METABOLIC PANEL: CPT

## 2020-10-30 PROCEDURE — 93005 ELECTROCARDIOGRAM TRACING: CPT | Performed by: PHYSICIAN ASSISTANT

## 2020-10-30 PROCEDURE — 85610 PROTHROMBIN TIME: CPT

## 2020-10-30 PROCEDURE — 2580000003 HC RX 258: Performed by: PHYSICIAN ASSISTANT

## 2020-10-30 PROCEDURE — 84481 FREE ASSAY (FT-3): CPT

## 2020-10-30 PROCEDURE — 84443 ASSAY THYROID STIM HORMONE: CPT

## 2020-10-30 PROCEDURE — 85730 THROMBOPLASTIN TIME PARTIAL: CPT

## 2020-10-30 PROCEDURE — 1240000000 HC EMOTIONAL WELLNESS R&B

## 2020-10-30 PROCEDURE — 71045 X-RAY EXAM CHEST 1 VIEW: CPT

## 2020-10-30 PROCEDURE — 84439 ASSAY OF FREE THYROXINE: CPT

## 2020-10-30 PROCEDURE — 85025 COMPLETE CBC W/AUTO DIFF WBC: CPT

## 2020-10-30 RX ORDER — HYDRALAZINE HYDROCHLORIDE 25 MG/1
100 TABLET, FILM COATED ORAL 3 TIMES DAILY PRN
Status: DISCONTINUED | OUTPATIENT
Start: 2020-10-30 | End: 2020-11-09 | Stop reason: HOSPADM

## 2020-10-30 RX ORDER — MAGNESIUM HYDROXIDE/ALUMINUM HYDROXICE/SIMETHICONE 120; 1200; 1200 MG/30ML; MG/30ML; MG/30ML
30 SUSPENSION ORAL EVERY 6 HOURS PRN
Status: DISCONTINUED | OUTPATIENT
Start: 2020-10-30 | End: 2020-11-09 | Stop reason: HOSPADM

## 2020-10-30 RX ORDER — HYDROXYZINE PAMOATE 50 MG/1
50 CAPSULE ORAL EVERY 6 HOURS PRN
Status: DISCONTINUED | OUTPATIENT
Start: 2020-10-30 | End: 2020-11-09 | Stop reason: HOSPADM

## 2020-10-30 RX ORDER — LEVOTHYROXINE SODIUM 0.05 MG/1
50 TABLET ORAL DAILY
Status: DISCONTINUED | OUTPATIENT
Start: 2020-10-31 | End: 2020-11-02

## 2020-10-30 RX ORDER — LOSARTAN POTASSIUM 100 MG/1
100 TABLET ORAL DAILY
Status: DISCONTINUED | OUTPATIENT
Start: 2020-10-31 | End: 2020-11-09 | Stop reason: HOSPADM

## 2020-10-30 RX ORDER — TRAZODONE HYDROCHLORIDE 50 MG/1
50 TABLET ORAL NIGHTLY PRN
Status: DISCONTINUED | OUTPATIENT
Start: 2020-10-30 | End: 2020-11-09 | Stop reason: HOSPADM

## 2020-10-30 RX ORDER — ACETAMINOPHEN 500 MG
500 TABLET ORAL ONCE
Status: COMPLETED | OUTPATIENT
Start: 2020-10-30 | End: 2020-10-30

## 2020-10-30 RX ORDER — HYDRALAZINE HYDROCHLORIDE 20 MG/ML
10 INJECTION INTRAMUSCULAR; INTRAVENOUS ONCE
Status: COMPLETED | OUTPATIENT
Start: 2020-10-30 | End: 2020-10-30

## 2020-10-30 RX ORDER — PANTOPRAZOLE SODIUM 40 MG/1
40 TABLET, DELAYED RELEASE ORAL
Status: DISCONTINUED | OUTPATIENT
Start: 2020-10-31 | End: 2020-11-09 | Stop reason: HOSPADM

## 2020-10-30 RX ORDER — NIFEDIPINE 30 MG/1
30 TABLET, EXTENDED RELEASE ORAL NIGHTLY
Status: DISCONTINUED | OUTPATIENT
Start: 2020-10-31 | End: 2020-11-09 | Stop reason: HOSPADM

## 2020-10-30 RX ORDER — ACETAMINOPHEN 325 MG/1
650 TABLET ORAL EVERY 4 HOURS PRN
Status: DISCONTINUED | OUTPATIENT
Start: 2020-10-30 | End: 2020-11-09 | Stop reason: HOSPADM

## 2020-10-30 RX ORDER — LORAZEPAM 0.5 MG/1
0.5 TABLET ORAL EVERY 4 HOURS PRN
Status: DISCONTINUED | OUTPATIENT
Start: 2020-10-30 | End: 2020-11-09 | Stop reason: HOSPADM

## 2020-10-30 RX ORDER — BENZTROPINE MESYLATE 1 MG/ML
2 INJECTION INTRAMUSCULAR; INTRAVENOUS 2 TIMES DAILY PRN
Status: DISCONTINUED | OUTPATIENT
Start: 2020-10-30 | End: 2020-11-09 | Stop reason: HOSPADM

## 2020-10-30 RX ORDER — ASPIRIN 81 MG/1
81 TABLET ORAL DAILY
Status: DISCONTINUED | OUTPATIENT
Start: 2020-10-31 | End: 2020-11-09 | Stop reason: HOSPADM

## 2020-10-30 RX ORDER — METOPROLOL SUCCINATE 50 MG/1
100 TABLET, EXTENDED RELEASE ORAL DAILY
Status: DISCONTINUED | OUTPATIENT
Start: 2020-10-31 | End: 2020-11-09 | Stop reason: HOSPADM

## 2020-10-30 RX ORDER — TRAMADOL HYDROCHLORIDE 50 MG/1
50 TABLET ORAL EVERY 6 HOURS PRN
Status: DISCONTINUED | OUTPATIENT
Start: 2020-10-30 | End: 2020-11-09 | Stop reason: HOSPADM

## 2020-10-30 RX ORDER — 0.9 % SODIUM CHLORIDE 0.9 %
250 INTRAVENOUS SOLUTION INTRAVENOUS ONCE
Status: COMPLETED | OUTPATIENT
Start: 2020-10-30 | End: 2020-10-30

## 2020-10-30 RX ORDER — OLANZAPINE 5 MG/1
5 TABLET ORAL EVERY 4 HOURS PRN
Status: DISCONTINUED | OUTPATIENT
Start: 2020-10-30 | End: 2020-11-09 | Stop reason: HOSPADM

## 2020-10-30 RX ORDER — OLANZAPINE 10 MG/1
10 INJECTION, POWDER, LYOPHILIZED, FOR SOLUTION INTRAMUSCULAR 3 TIMES DAILY PRN
Status: DISCONTINUED | OUTPATIENT
Start: 2020-10-30 | End: 2020-11-09 | Stop reason: HOSPADM

## 2020-10-30 RX ADMIN — HYDRALAZINE HYDROCHLORIDE 10 MG: 20 INJECTION INTRAMUSCULAR; INTRAVENOUS at 18:35

## 2020-10-30 RX ADMIN — SODIUM CHLORIDE 250 ML: 9 INJECTION, SOLUTION INTRAVENOUS at 15:08

## 2020-10-30 RX ADMIN — ACETAMINOPHEN 500 MG: 500 TABLET ORAL at 20:36

## 2020-10-30 ASSESSMENT — PAIN DESCRIPTION - LOCATION
LOCATION: BACK
LOCATION: BACK

## 2020-10-30 ASSESSMENT — PAIN SCALES - GENERAL
PAINLEVEL_OUTOF10: 8

## 2020-10-30 ASSESSMENT — SLEEP AND FATIGUE QUESTIONNAIRES
SLEEP PATTERN: DIFFICULTY FALLING ASLEEP;INSOMNIA
RESTFUL SLEEP: NO
DIFFICULTY STAYING ASLEEP: YES
DIFFICULTY FALLING ASLEEP: YES
AVERAGE NUMBER OF SLEEP HOURS: 0
DO YOU USE A SLEEP AID: YES
DIFFICULTY ARISING: YES

## 2020-10-30 ASSESSMENT — PATIENT HEALTH QUESTIONNAIRE - PHQ9: SUM OF ALL RESPONSES TO PHQ QUESTIONS 1-9: 20

## 2020-10-30 ASSESSMENT — PAIN DESCRIPTION - PAIN TYPE
TYPE: CHRONIC PAIN
TYPE: CHRONIC PAIN

## 2020-10-30 NOTE — ED NOTES
Has patient ever been formally diagnoses with dementia? If yes, have they been given a specific dementia, Alzheimer's, dementia with Lewy bodies, or frontotemporal dementia? Nisreen Minaya (son) states that Dr. Sachi Mirza diagnoses the patient with \"a touch of dementia a while back. \"      What kind of doctor made the diagnoses? PCP    Has patient had any neuropsychological testing? Brain imaging performed? CT brain  BRAIN/VENTRICLES: There is no acute intracranial hemorrhage, mass effect or    midline shift.  No abnormal extra-axial fluid collection. Yovana Ye is extensive    white matter changes throughout the brain this finding appears stable    compared to prior study. Yovana Ye is no change in the appearance of the brain    or skull when compared to the prior study      Is pt currently receiving any treatment aimed directly at their dementia? Pt was taking zyprexa 5 mg at HS X 30 days. This did not seem to help, so pt and family were instructed to stop the zyprexa and come to [facility] for an evaluation. Managed by whom? Dr. Daryl Shook     What specific symptoms of concern have family observed, how long have these symptoms been present? Nisreen Minaya states that the patient hallucinates and is delusional at times. She believes Nisreen Minaya is trying to poison her and she will often refused to eat or take her meds for this reason. She also believes that Nisreen Minaya' GF, Swathi Stewart, lives in their home and is also trying to poison her. Nisreen Minaya states that [place] does not exist.  She believes Kayla lives in their back yard and she often have conversations with him. She beleives there is a gas line running through her bedroom. Nisreen Minaya states it is one thing after another almost constantly. Pt does RTIS, Nisreen Minaya states she will sit there and have long conversations with no one. Nisreen Minaya states that about a year ago she slide down to the ground next to her walker.   She was taken for treatment but there were not any injuries. Ai Holden the patient has not walked since this happened. [facility] states that there has been a steady decline since this incident. Pt has a bath aide that comes in 2 X weekly. A nurse comes in weekly and her daughter in law comes on saturdays and helps her get a bath. Pt has become argumentative with nurse on occasion. Pt will sleep for a few hours here and there. She does not normally sleep at night. How does current behavior compare to pt's baseline? What is baseline level of functioning/behavior for this patient? What activities do they need help with compared to performing independently? Have any medications been changed recently? Not just psych meds. The only med change has been the zyprexa. Is there anything we need to know about the patient to make them comfortable on the unit? No      What are families expectations for hospitalization? Just for the patient to be able to think more clearly. Ai Holden would like for his mother to return home. He is not ready for her to go to a nursing home.            Arnulfo Phelps RN  10/30/20 [id number]

## 2020-10-30 NOTE — ED NOTES
Pt SB at 54-59 bpm. Provider Linda BURCIAGA aware. No new orders at this time.       Yokasta Peters RN  10/30/20 9439

## 2020-10-30 NOTE — ED NOTES
Level of Care Disposition: Admit    Patient was seen by ED provider and [facility] AFFIL[facility] staff. The case presented to psychiatric provider on-call Dr. Shayne Alexandre. Based on the ED evaluation and information presented to the provider by Bryson Steward, it was determined that inpatient hospitalization is the least restrictive environment for the patient at this time. The patient will be admitted to the inpatient unit. Admitting provider did not order suicide precautions based on the unit is a safe and secure environment. RATIONALE FOR ADMISSION:   Patient at imminent risk of danger to self as demonstrated by not caring her self and not eating or sleeping.      Insurance Pre certification Authorization: CALIN Gama RN  10/30/20 9699

## 2020-10-30 NOTE — ED PROVIDER NOTES
I independently performed a history and physical on Joi Benedict. All diagnostic, treatment, and disposition decisions were made by myself in conjunction with the advanced practice provider. For further details of [facility] emergency department encounter, please see GUALBERTO Charles's documentation. Son reports patient is having increasing delusions and has been aggressive. Did not respond to Zyprexa. No medical complaints at this time. On exam patient is awake and alert and heart is regular rate and rhythm and lungs clear to auscultation bilaterally. EKG  The Ekg interpreted by me shows  sinus bradycardia, rate=58   Axis is   Normal  QTc is  normal  Intervals and Durations are unremarkable. ST Segments: normal  No significant change from prior EKG dated 5/11/2020    Ct Head Wo Contrast  No acute intracranial abnormality. No significant change from prior studies     Xr Chest Portable  No acute cardiopulmonary disease. Mild cardiomegaly without overt failure.      Results for orders placed or performed during the hospital encounter of 10/30/20   CBC auto differential   Result Value Ref Range    WBC 12.3 (H) 4.0 - 11.0 K/uL    RBC 4.03 4.00 - 5.20 M/uL    Hemoglobin 12.7 12.0 - 16.0 g/dL    Hematocrit 38.1 36.0 - 48.0 %    MCV 94.6 80.0 - 100.0 fL    MCH 31.5 26.0 - 34.0 pg    MCHC 33.3 31.0 - 36.0 g/dL    RDW 14.5 12.4 - 15.4 %    Platelets 508 180 - 929 K/uL    MPV 7.7 5.0 - 10.5 fL    Neutrophils % 63.5 %    Lymphocytes % 18.2 %    Monocytes % 5.8 %    Eosinophils % 11.7 %    Basophils % 0.8 %    Neutrophils Absolute 7.8 (H) 1.7 - 7.7 K/uL    Lymphocytes Absolute 2.2 1.0 - 5.1 K/uL    Monocytes Absolute 0.7 0.0 - 1.3 K/uL    Eosinophils Absolute 1.4 (H) 0.0 - 0.6 K/uL    Basophils Absolute 0.1 0.0 - 0.2 K/uL   Comprehensive Metabolic Panel w/ Reflex to MG   Result Value Ref Range    Sodium 132 (L) 136 - 145 mmol/L    Potassium reflex Magnesium 4.4 3.5 - 5.1 mmol/L    Chloride 96 (L) 99 - 110 mmol/L    CO2 27 21 - 32 mmol/L    Anion Gap 9 3 - 16    Glucose 119 (H) 70 - 99 mg/dL    BUN 24 (H) 7 - 20 mg/dL    CREATININE 0.7 0.6 - 1.2 mg/dL    GFR Non-African American >60 >60    GFR African American >60 >60    Calcium 9.1 8.3 - 10.6 mg/dL    Total Protein 6.1 (L) 6.4 - 8.2 g/dL    Alb 3.4 3.4 - 5.0 g/dL    Albumin/Globulin Ratio 1.3 1.1 - 2.2    Total Bilirubin 0.4 0.0 - 1.0 mg/dL    Alkaline Phosphatase 100 40 - 129 U/L    ALT 9 (L) 10 - 40 U/L    AST 13 (L) 15 - 37 U/L    Globulin 2.7 g/dL   Urinalysis Reflex to Culture    Specimen: Urine, clean catch   Result Value Ref Range    Color, UA Yellow Straw/Yellow    Clarity, UA SL CLOUDY (A) Clear    Glucose, Ur Negative Negative mg/dL    Bilirubin Urine Negative Negative    Ketones, Urine Negative Negative mg/dL    Specific Gravity, UA 1.020 1.005 - 1.030    Blood, Urine TRACE-INTACT (A) Negative    pH, UA 7.0 5.0 - 8.0    Protein, UA Negative Negative mg/dL    Urobilinogen, Urine 1.0 <2.0 E.U./dL    Nitrite, Urine Negative Negative    Leukocyte Esterase, Urine Negative Negative    Microscopic Examination YES     Urine Type NotGiven     Urine Reflex to Culture Not Indicated    T3, free   Result Value Ref Range    T3, Free 1.4 (L) 2.3 - 4.2 pg/mL   TSH without Reflex   Result Value Ref Range    TSH 66.80 (H) 0.27 - 4.20 uIU/mL   Protime-INR   Result Value Ref Range    Protime 11.2 10.0 - 13.2 sec    INR 0.97 0.86 - 1.14   APTT   Result Value Ref Range    aPTT 28.5 24.2 - 36.2 sec   Microscopic Urinalysis   Result Value Ref Range    WBC, UA 6-9 (A) 0 - 5 /HPF    RBC, UA 3-4 0 - 4 /HPF    Bacteria, UA 4+ (A) None Seen /HPF   COVID-19   Result Value Ref Range    SARS-CoV-2, NAAT Not Detected Not Detected   EKG 12 Lead   Result Value Ref Range    Ventricular Rate 58 BPM    Atrial Rate 58 BPM    P-R Interval 178 ms    QRS Duration 78 ms    Q-T Interval 432 ms    QTc Calculation (Bazett) 424 ms    P Axis 45 degrees    R Axis 49 degrees    T Axis 63 degrees Diagnosis       Sinus bradycardiaBaseline artifactSeptal infarct , age undeterminedNo previous ECGs availableConfirmed by JOAQUÍN ARGUELLO, [facility] ([id number]) on 10/30/2020 5:59:04 PM       In my opinion the patient is medically stable for inpatient psychiatric treatment, medically cleared.            Mickey Pedersen MD  10/31/20 1559

## 2020-10-30 NOTE — ED NOTES
Report given to Socorro Elena on [place] unit. Nikia TOBAR informed RN that pt's bed was beng cleaned. Nikia RN requesting medication to treat pt's BP of 196/87. Provider Dr. Lisa Santamaria placed order for PRN hydralazine. RN will administer.       Naomi Reese RN  10/30/20 Æjaime Dean RN  10/30/20 [id number]

## 2020-10-30 NOTE — ED NOTES
RN performed straight cath to collect urine specimen, then placed purwick. Pt verbalized understanding and tolerated both procedures well.       Valeria Alas RN  10/30/20 [id number]

## 2020-10-30 NOTE — ED PROVIDER NOTES
includes Hysterectomy and Thyroidectomy. Social History:  reports that she has quit smoking. She has never used smokeless tobacco. She reports that she does not drink alcohol or use drugs. Family History: family history includes Cancer in her brother. Allergies: Amlodipine besylate; Demerol hcl [meperidine]; Hydrochlorothiazide; Lisinopril; and Meperidine hcl    Physical Exam           ED Triage Vitals [10/30/20 1152]   BP Temp Temp Source Pulse Resp SpO2 Height Weight   (!) 150/66 97.2 °F (36.2 °C) Oral 59 16 100 % 5' 5.5\" (1.664 m) 140 lb (63.5 kg)      Oxygen Saturation Interpretation: Normal.      General Appearance:  Elderly, [place], +dementia, unable to verbalize why she is here today. Constitutional:   Level of Consciousness: Awake and alert. ETOH: No.          Distress: none. Cooperativeness: cooperative. Eyes:  PERRL, EOMI, no discharge or conjunctival injection. Ears:  External ears without lesions. Throat:  Pharynx without injection, exudate, or tonsillar hypertrophy. Airway patient. Neck:  Normal ROM. Supple. Respiratory:  Clear to auscultation and breath sounds equal.  CV:  Regular rate and rhythm, normal heart sounds, without pathological murmurs, ectopy, gallops, or rubs. GI:  Abdomen Soft, nontender, good bowel sounds. No firm or pulsatile mass. Back:  No costovertebral tenderness. Integument:  Normal turgor. Warm, dry, without visible rash, unless noted elsewhere. Lymphatics: No lymphangitis or adenopathy noted. Neurological:  Oriented. Motor functions intact. Psychiatric:        Thought Process:       Coherent:  Yes. Delusions / Paranoia: paranoid. Flight of ideas:  No.         Rambling conversation:  No.       Affect: calm. Suicidal ideation:  denies. Homicidal ideation:  no homicidal ideation. Perceptions:  present.          Lab / Imaging Results   (All laboratory and radiology results have been personally reviewed by myself)  Labs:  Results for orders placed or performed during the hospital encounter of 10/30/20   CBC auto differential   Result Value Ref Range    WBC 12.3 (H) 4.0 - 11.0 K/uL    RBC 4.03 4.00 - 5.20 M/uL    Hemoglobin 12.7 12.0 - 16.0 g/dL    Hematocrit 38.1 36.0 - 48.0 %    MCV 94.6 80.0 - 100.0 fL    MCH 31.5 26.0 - 34.0 pg    MCHC 33.3 31.0 - 36.0 g/dL    RDW 14.5 12.4 - 15.4 %    Platelets 065 050 - 217 K/uL    MPV 7.7 5.0 - 10.5 fL    Neutrophils % 63.5 %    Lymphocytes % 18.2 %    Monocytes % 5.8 %    Eosinophils % 11.7 %    Basophils % 0.8 %    Neutrophils Absolute 7.8 (H) 1.7 - 7.7 K/uL    Lymphocytes Absolute 2.2 1.0 - 5.1 K/uL    Monocytes Absolute 0.7 0.0 - 1.3 K/uL    Eosinophils Absolute 1.4 (H) 0.0 - 0.6 K/uL    Basophils Absolute 0.1 0.0 - 0.2 K/uL   Comprehensive Metabolic Panel w/ Reflex to MG   Result Value Ref Range    Sodium 132 (L) 136 - 145 mmol/L    Potassium reflex Magnesium 4.4 3.5 - 5.1 mmol/L    Chloride 96 (L) 99 - 110 mmol/L    CO2 27 21 - 32 mmol/L    Anion Gap 9 3 - 16    Glucose 119 (H) 70 - 99 mg/dL    BUN 24 (H) 7 - 20 mg/dL    CREATININE 0.7 0.6 - 1.2 mg/dL    GFR Non-African American >60 >60    GFR African American >60 >60    Calcium 9.1 8.3 - 10.6 mg/dL    Total Protein 6.1 (L) 6.4 - 8.2 g/dL    Alb 3.4 3.4 - 5.0 g/dL    Albumin/Globulin Ratio 1.3 1.1 - 2.2    Total Bilirubin 0.4 0.0 - 1.0 mg/dL    Alkaline Phosphatase 100 40 - 129 U/L    ALT 9 (L) 10 - 40 U/L    AST 13 (L) 15 - 37 U/L    Globulin 2.7 g/dL   Urinalysis Reflex to Culture    Specimen: Urine, clean catch   Result Value Ref Range    Color, UA Yellow Straw/Yellow    Clarity, UA SL CLOUDY (A) Clear    Glucose, Ur Negative Negative mg/dL    Bilirubin Urine Negative Negative    Ketones, Urine Negative Negative mg/dL    Specific Gravity, UA 1.020 1.005 - 1.030    Blood, Urine TRACE-INTACT (A) Negative    pH, UA 7.0 5.0 - 8.0    Protein, UA Negative Negative mg/dL    Urobilinogen, Urine 1.0 <2.0 E.U./dL    Nitrite, throughout her visit. Patient is pleasant throughout her visit. I have performed a medical clearance examination on this patient. It is my opinion that no medical conditions were discovered that would preclude admission to a behavioral health unit or discharge home. I feel that the patient is medically stable for disposition by the behavioral health team at this time. Counseling: The emergency provider has spoken with the patient and discussed todays results, in addition to providing specific details for the plan of care and counseling regarding the diagnosis and prognosis. Questions are answered at this time and they are agreeable with the plan. Assessment      1. Psychosis, unspecified psychosis type ([address]    2. Hallucinations    3. Dementia with behavioral disturbance, unspecified dementia type ([address]    4. Essential hypertension    5. Bradycardia      Plan   Admit to mental health unit - medically cleared for admission  Patient condition is stable    New Medications     New Prescriptions    No medications on file     Electronically signed by Nguyen Gonsales PA-C   DD: 10/30/20  **This report was transcribed using voice recognition software. Every effort was made to ensure accuracy; however, inadvertent computerized transcription errors may be present.   END OF ED PROVIDER NOTE        Nguyen Gonsales PA-C  10/30/20 [id number]

## 2020-10-30 NOTE — ED TRIAGE NOTES
Chief Complaint   Patient presents with    Psychiatric Evaluation     family states pt has been having paranoia and thinks family members are trying to kill her, family states behaviours have been increasing x 3-4 months and unable to handle anymore, sent to ED by pcp for eval Bilobed Transposition Flap Text: The defect edges were debeveled with a #15 scalpel blade.  Given the location of the defect and the proximity to free margins a bilobed transposition flap was deemed most appropriate.  Using a sterile surgical marker, an appropriate bilobe flap drawn around the defect.    The area thus outlined was incised deep to adipose tissue with a #15 scalpel blade.  The skin margins were undermined to an appropriate distance in all directions utilizing iris scissors.

## 2020-10-31 LAB
CHOLESTEROL, TOTAL: 170 MG/DL (ref 0–199)
HDLC SERPL-MCNC: 42 MG/DL (ref 40–60)
LDL CHOLESTEROL CALCULATED: 108 MG/DL
T4 FREE: 0.9 NG/DL (ref 0.9–1.8)
TRIGL SERPL-MCNC: 102 MG/DL (ref 0–150)
VLDLC SERPL CALC-MCNC: 20 MG/DL

## 2020-10-31 PROCEDURE — 36415 COLL VENOUS BLD VENIPUNCTURE: CPT

## 2020-10-31 PROCEDURE — 6370000000 HC RX 637 (ALT 250 FOR IP): Performed by: PSYCHIATRY & NEUROLOGY

## 2020-10-31 PROCEDURE — 99223 1ST HOSP IP/OBS HIGH 75: CPT | Performed by: PSYCHIATRY & NEUROLOGY

## 2020-10-31 PROCEDURE — 83036 HEMOGLOBIN GLYCOSYLATED A1C: CPT

## 2020-10-31 PROCEDURE — 80061 LIPID PANEL: CPT

## 2020-10-31 PROCEDURE — 99223 1ST HOSP IP/OBS HIGH 75: CPT | Performed by: PHYSICIAN ASSISTANT

## 2020-10-31 PROCEDURE — 1240000000 HC EMOTIONAL WELLNESS R&B

## 2020-10-31 RX ADMIN — TRAMADOL HYDROCHLORIDE 50 MG: 50 TABLET, FILM COATED ORAL at 17:24

## 2020-10-31 RX ADMIN — LOSARTAN POTASSIUM 100 MG: 100 TABLET, FILM COATED ORAL at 12:22

## 2020-10-31 RX ADMIN — METOPROLOL SUCCINATE 100 MG: 50 TABLET, EXTENDED RELEASE ORAL at 12:21

## 2020-10-31 RX ADMIN — TRAMADOL HYDROCHLORIDE 50 MG: 50 TABLET, FILM COATED ORAL at 00:32

## 2020-10-31 RX ADMIN — PANTOPRAZOLE SODIUM 40 MG: 40 TABLET, DELAYED RELEASE ORAL at 06:26

## 2020-10-31 RX ADMIN — LEVOTHYROXINE SODIUM 50 MCG: 50 TABLET ORAL at 06:27

## 2020-10-31 RX ADMIN — LORAZEPAM 0.5 MG: 0.5 TABLET ORAL at 00:32

## 2020-10-31 RX ADMIN — ASPIRIN 81 MG: 81 TABLET, COATED ORAL at 12:22

## 2020-10-31 ASSESSMENT — SLEEP AND FATIGUE QUESTIONNAIRES
DIFFICULTY STAYING ASLEEP: YES
AVERAGE NUMBER OF SLEEP HOURS: 2
RESTFUL SLEEP: NO
DIFFICULTY ARISING: NO
DIFFICULTY FALLING ASLEEP: YES
DO YOU USE A SLEEP AID: NO
DO YOU HAVE DIFFICULTY SLEEPING: YES
SLEEP PATTERN: DIFFICULTY FALLING ASLEEP;DISTURBED/INTERRUPTED SLEEP;INSOMNIA

## 2020-10-31 ASSESSMENT — PAIN SCALES - GENERAL
PAINLEVEL_OUTOF10: 6
PAINLEVEL_OUTOF10: 3
PAINLEVEL_OUTOF10: 10

## 2020-10-31 ASSESSMENT — LIFESTYLE VARIABLES: HISTORY_ALCOHOL_USE: NO

## 2020-10-31 ASSESSMENT — PATIENT HEALTH QUESTIONNAIRE - PHQ9: SUM OF ALL RESPONSES TO PHQ QUESTIONS 1-9: 21

## 2020-10-31 NOTE — BH NOTE
routine, distraction)                                                           ( )  Basic information about quitting (benefits of quitting, techniques in how to quit, available resources  ( ) Referral for counseling faxed to [facility]                                           ( ) Patient refused counseling  ( X) Patient has not smoked in the last 30 days    Metabolic Screening:    No results found for: LABA1C    No results for input(s): CHOL, TRIG, HDL, LDLCALC, LABVLDL in the last 72 hours. Body mass index is 18.8 kg/m². BP Readings from Last 2 Encounters:   10/30/20 (!) 174/68   05/14/20 (!) 148/78           Pt admitted with followings belongings:  Dentures: Lowers, Uppers  Vision - Corrective Lenses: Glasses(Readers)  Hearing Aid: Bilateral hearing aids(Didn't bring)  Jewelry: None  Body Piercings Removed: N/A  Clothing: None  Were All Patient Medications Collected?: Not Applicable  Other Valuables: Cell phone     Valuables sent home with N/A. Valuables placed in safe in security envelope, number:  . Patient's home medications were N/A. Patient oriented to surroundings and program expectations and copy of patient rights given. Received admission packet:  Yes.   Consents reviewed, signed No. Refused No. Patient verbalize understanding: No.    Patient education on precautions: Heaven Baker RN

## 2020-10-31 NOTE — PROGRESS NOTES
Senior Purposeful Rounding    Position:Sitting    Physical Environment:Room free from clutter, Clear path to bathroom , Adequate lighting, Bed alarm functioning and No safety hazards noted    Pain Rating/ Nonverbal Pain Behaviors:0; Sleeping    Pain interventions Attempted: Repositioning    Patient Toileted:Continent and No- Void

## 2020-10-31 NOTE — PLAN OF CARE
Problem: Skin Integrity:  Goal: Will show no infection signs and symptoms  Description: Will show no infection signs and symptoms  Outcome: Ongoing     Problem: Falls - Risk of:  Goal: Will remain free from falls  Description: Will remain free from falls  Outcome: Ongoing     Problem: Altered Mood, Deterioration in Function:  Goal: Ability to perform activities of daily living will improve  Description: Ability to perform activities of daily living will improve  Outcome: Ongoing  Goal: Able to verbalize reality based thinking  Description: Able to verbalize reality based thinking  Outcome: Ongoing    Pt is alert and oriented x3 throughout most of shift. Forgetful at times and somewhat repetitive. Pleasant and cooperative. Total assist w/ transfers and care. Incontinent of large amounts of urine. Continues w/ severe pain and back and legs. Tramadol given for pain @ 1724. Med compliant whole. No paranoia noted but preoccupied w/ getting her BP medications. Good appetite. Denies SI/HI. Reports +AVH for past 8 months but no RTIS noted this shift. Denies any other needs at this time.

## 2020-10-31 NOTE — ED NOTES
Report and update given to José Rossi. Security call and chaperoned to the floor with RN. Patient VSS.       Pietro aHrrison RN  10/30/20 211

## 2020-10-31 NOTE — H&P
Hospital Medicine History & Physical      PCP: Hanna Lou MD    Date of Admission: 10/30/2020    Date of Service: Pt seen/examined on 10/31/2020     Chief Complaint:    Chief Complaint   Patient presents with    Psychiatric Evaluation     family states pt has been having paranoia and thinks family members are trying to kill her, family states behaviours have been increasing x 3-4 months and unable to handle anymore, sent to ED by pcp for eval         History Of Present Illness: The patient is a 80 y.o. female with a PMH of Anxiety, Depression, HTN, Hypothyroidism and chronic diastolic CHF who [facility] for [facility] with behavioral disturbances. Patient was seen and evaluated in the ED by the ED medical provider, patient was medically cleared for admission to [facility]. This note serves as an admission medical H&P.   PCP: Hanna Lou MD   Tobacco Use: denies  EtOH Use: denies  Illicit Drug Use: denies    Pt denies any medical concerns at this time. Pt endorses medication compliance with all medications    Past Medical History:        Diagnosis Date    Acute bilateral low back pain with bilateral sciatica 6/8/2017    [facility]     Anxiety     Cancer ([facility]     ovarian cancer    Degeneration of lumbar or lumbosacral intervertebral disc 5/7/2018    Depression     Hypertension     Hypothyroid 2/20/2017    Thyroid disease        Past Surgical History:        Procedure Laterality Date    HYSTERECTOMY      THYROIDECTOMY         Medications Prior to Admission:    Prior to Admission medications    Medication Sig Start Date End Date Taking? Authorizing Provider   levothyroxine (SYNTHROID) 25 MCG tablet Take 2 tablets by mouth Daily 5/14/20  Yes Nikki Field MD   traMADol (ULTRAM) 50 MG tablet Take 50 mg by mouth every 6 hours as needed for Pain. Yes Historical Provider, MD   ALPRAZolam Maru Landa) 0.25 MG tablet Take 0.25 mg by mouth nightly as needed for Sleep.    Yes midline. Resp: No accessory muscle use. No crackles. No wheezes. No rhonchi. CV: Regular rate. Regular rhythm. No murmur. No rub. No edema. GI: Soft, Non-tender. Non-distended. Normal bowel sounds. Skin: Warm and dry. No rash on exposed extremities. Scattered areas of ecchymosis to BLE  M/S: No cyanosis. No clubbing. Ambulates with assistance  Neuro: Awake. Grossly nonfocal, CN II-XII intact    Psych: Oriented x 3. Defer to psychiatry. CBC:   Recent Labs     10/30/20  1204   WBC 12.3*   HGB 12.7   HCT 38.1   MCV 94.6        BMP:   Recent Labs     10/30/20  1204   *   K 4.4   CL 96*   CO2 27   BUN 24*   CREATININE 0.7     LIVER PROFILE:   Recent Labs     10/30/20  1204   AST 13*   ALT 9*   BILITOT 0.4   ALKPHOS 100     PT/INR:   Recent Labs     10/30/20  1204   PROTIME 11.2   INR 0.97     APTT:   Recent Labs     10/30/20  1204   APTT 28.5     UA:  Recent Labs     10/30/20  1320   COLORU Yellow   PHUR 7.0   WBCUA 6-9*   RBCUA 3-4   BACTERIA 4+*   CLARITYU SL CLOUDY*   SPECGRAV 1.020   LEUKOCYTESUR Negative   UROBILINOGEN 1.0   BILIRUBINUR Negative   BLOODU TRACE-INTACT*   GLUCOSEU Negative      U/A:    Lab Results   Component Value Date    NITRITE NEG 08/18/2016    COLORU Yellow 10/30/2020    WBCUA 6-9 10/30/2020    RBCUA 3-4 10/30/2020    MUCUS Rare 06/22/2017    BACTERIA 4+ 10/30/2020    CLARITYU SL CLOUDY 10/30/2020    SPECGRAV 1.020 10/30/2020    LEUKOCYTESUR Negative 10/30/2020    BLOODU TRACE-INTACT 10/30/2020    GLUCOSEU Negative 10/30/2020     CULTURES  None    EKG:  I have reviewed the EKG with the following interpretation:   10/30/2020  Sinus bradycardia rate of 58  Baseline artifact  Septal infarct , age undetermined  No previous ECGs available  Confirmed by GERARDO YANES MD ([id number]) on 10/30/2020 5:59:04 PM     RADIOLOGY    CT head 10/30/2020  No acute intracranial abnormality.         No significant change from prior studies      CXR 10/30/2020  No acute cardiopulmonary disease.  Mild cardiomegaly without overt failure. Pertinent previous results reviewed   SARS-CoV-2: not detected    ASSESSMENT/PLAN:    Dementia with Behavioral Distubances  - cont mgmt per [facility]    Hypothyroidism  Elevated TSH  - TSH 66.8, free T3: 1.4, check free T4  - pt endorses medication compliance  - home dose of synthroid 50 mcg, previously on 25 mcg  - will cont Synthroid here, will need repeat TSH in 4-6 weeks OP    HTN  - initially elevated, has since trended down  - cont Cozaar, Toprol XL, Procardia  - monitor    GERD  - cont Protonix    PAF  - NSR on EKG  - no AC - reason unclear  - cont BB    Chronic Diastolic CHF  - appears compensated  - low Na diet, daily weights  - cont BB; no diuretics on home med list    Chronic Low Back Pain  - PRN Ultram    Mild CAD  - no c/o chest pain  - cont ASA, BB    Pt has no medical complaints at this time. Pt was informed that they may have [facility] contact us should any medical concerns arise during this admission.     Caro Berger PA-C 12:51 PM 10/31/2020

## 2020-10-31 NOTE — BH NOTE
4 Eyes Skin Assessment     The patient is being assess for  Admission    I agree that 2 RN's have performed a thorough Head to Toe Skin Assessment on the patient. ALL assessment sites listed below have been assessed. Areas assessed by both nurses:   [x]   Head, Face, and Ears   [x]   Shoulders, Back, and Chest  [x]   Arms, Elbows, and Hands   [x]   Coccyx, Sacrum, and Ischum  [x]   Legs, Feet, and Heels        Does the Patient have Skin Breakdown? Yes a wound was noted on the Admission Assessment and an WOUND LDA was Initiated documentation include the Yesenia-wound, Wound Assessment, Measurements, Dressing Treatment, Drainage, and Color\", She has multiple scattered bruising to her limbs, with her lower legs being significantly worse. Her perirectal area is red. Stage 3 noted to right distal tibia. It appears to have been a shearing of skin that is non healing an pt lays on the area causing pressure. Left posterior calf with pressure area 1cm X.75 cm. Yellow center. Left knee with abrasion draining serosang shayne Hanna Prevention initiated:  Yes   Wound Care Orders initiated:  Yes      [address]  nurse consulted for Pressure Injury (Stage 3,4, Unstageable, DTI, NWPT, and Complex wounds):  Yes      Nurse 1 eSignature: Electronically signed by Kipp Nissen, RN on 10/31/20 at 1:02 AM EDT    **SHARE this note so that the co-signing nurse is able to place an eSignature**    Nurse 2 eSignature: Electronically signed by Anamika Elizabeth RN on 10/31/20 at 3:40 AM EDT

## 2020-10-31 NOTE — BH NOTE
Patient was provided with a mask to utilize on unit. All staff will continue to utilize masks when interacting with patient.

## 2020-10-31 NOTE — BH NOTE
Senior Purposeful Rounding    Position:Repositions Self    Physical Environment:Room free from clutter, Clear path to bathroom , Adequate lighting, Bed alarm functioning and No safety hazards noted    Pain Rating/ Nonverbal Pain Behaviors:8; Pt declines pain medication at this time    Pain interventions Attempted: Repositioning is effective    Patient Toileted:Incontinent, brief damp on admission

## 2020-10-31 NOTE — PLAN OF CARE
Pt arrived on the unit at 2115. She is alert and oriented to person, place, month and year. When asked why she was here, she states \"It's either the edema or Alzheimer's. \" She was cooperative with the physical and mental assessment. Once done and pt was left alone in the room, she began to call out. She stated \"My diaper is wet\", when in fact it was dry. Then she began to complain that the bed was \"Hurting me all over\". She wanted the side rails up because she was afraid to fall out of bed. Once up, she complained they were hurting her knees. She was repositioned X3. Her legs were not touching the rails. Ativan and Tramadol were administered. She was offered a recliner and she accepted. She wanted to be in the day room with staff. She requested the foot of the recliner be raised slightly, although she was not able to do it for herself. It was raised to her satisfaction, about 1/3 of the way up. Once out in the day room, the pt has been noted to be responding to internal stimuli. She is telling someone \"I don't know why they won't let him in, he's standing right there\". Pt admits to hearing voices. She states her sons girlfriend is Trina Morales. And Gina Salas is trying to kill her. 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 She continues to perseverate on her. She is becoming confused. She thinks a new admission is her daughter and we are trying to kill her behind the closed door. Educated regarding changing of the gown and the private questions that are being asked of the new pt. She then goes on to state her son is in the room. Pt was reoriented and asked not to yell out as the other patients are trying to sleep. She has been compliant thus far. The pt has been noted to have 3 wounds on her lower legs and multiple bruises to her lower legs. She has small scattered bruises on her arms and her perirectal area is red.

## 2020-10-31 NOTE — PROGRESS NOTES
Senior Purposeful Rounding    Position:Sitting    Physical Environment:Room free from clutter, Clear path to bathroom , Adequate lighting, Bed alarm functioning and No safety hazards noted    Pain Rating/ Nonverbal Pain Behaviors:0; Sleeping    Pain interventions Attempted: N/A    Patient Toileted:Incontinent and No- Void

## 2020-11-01 LAB
ESTIMATED AVERAGE GLUCOSE: 131.2 MG/DL
HBA1C MFR BLD: 6.2 %

## 2020-11-01 PROCEDURE — 6370000000 HC RX 637 (ALT 250 FOR IP): Performed by: PSYCHIATRY & NEUROLOGY

## 2020-11-01 PROCEDURE — 1240000000 HC EMOTIONAL WELLNESS R&B

## 2020-11-01 RX ORDER — POLYETHYLENE GLYCOL 3350 17 G/17G
17 POWDER, FOR SOLUTION ORAL DAILY
Status: DISCONTINUED | OUTPATIENT
Start: 2020-11-01 | End: 2020-11-09 | Stop reason: HOSPADM

## 2020-11-01 RX ADMIN — ASPIRIN 81 MG: 81 TABLET, COATED ORAL at 08:50

## 2020-11-01 RX ADMIN — LEVOTHYROXINE SODIUM 50 MCG: 50 TABLET ORAL at 05:02

## 2020-11-01 RX ADMIN — LOSARTAN POTASSIUM 100 MG: 100 TABLET, FILM COATED ORAL at 08:50

## 2020-11-01 RX ADMIN — TRAMADOL HYDROCHLORIDE 50 MG: 50 TABLET, FILM COATED ORAL at 20:57

## 2020-11-01 RX ADMIN — METOPROLOL SUCCINATE 100 MG: 50 TABLET, EXTENDED RELEASE ORAL at 08:50

## 2020-11-01 RX ADMIN — NIFEDIPINE 30 MG: 30 TABLET, FILM COATED, EXTENDED RELEASE ORAL at 20:57

## 2020-11-01 RX ADMIN — TRAMADOL HYDROCHLORIDE 50 MG: 50 TABLET, FILM COATED ORAL at 06:08

## 2020-11-01 RX ADMIN — PANTOPRAZOLE SODIUM 40 MG: 40 TABLET, DELAYED RELEASE ORAL at 05:02

## 2020-11-01 RX ADMIN — POLYETHYLENE GLYCOL (3350) 17 G: 17 POWDER, FOR SOLUTION ORAL at 09:44

## 2020-11-01 ASSESSMENT — PAIN DESCRIPTION - FREQUENCY
FREQUENCY: CONTINUOUS

## 2020-11-01 ASSESSMENT — PAIN DESCRIPTION - ONSET
ONSET: ON-GOING

## 2020-11-01 ASSESSMENT — PAIN - FUNCTIONAL ASSESSMENT
PAIN_FUNCTIONAL_ASSESSMENT: PREVENTS OR INTERFERES SOME ACTIVE ACTIVITIES AND ADLS

## 2020-11-01 ASSESSMENT — PAIN DESCRIPTION - ORIENTATION
ORIENTATION: LOWER

## 2020-11-01 ASSESSMENT — PAIN DESCRIPTION - PROGRESSION
CLINICAL_PROGRESSION: NOT CHANGED
CLINICAL_PROGRESSION: GRADUALLY IMPROVING
CLINICAL_PROGRESSION: GRADUALLY IMPROVING
CLINICAL_PROGRESSION: GRADUALLY WORSENING

## 2020-11-01 ASSESSMENT — PAIN DESCRIPTION - PAIN TYPE
TYPE: CHRONIC PAIN

## 2020-11-01 ASSESSMENT — PAIN DESCRIPTION - DESCRIPTORS
DESCRIPTORS: ACHING;DISCOMFORT
DESCRIPTORS: ACHING
DESCRIPTORS: ACHING;DISCOMFORT
DESCRIPTORS: ACHING;DISCOMFORT

## 2020-11-01 ASSESSMENT — PAIN DESCRIPTION - LOCATION
LOCATION: BACK

## 2020-11-01 ASSESSMENT — PAIN SCALES - GENERAL
PAINLEVEL_OUTOF10: 2
PAINLEVEL_OUTOF10: 6
PAINLEVEL_OUTOF10: 2
PAINLEVEL_OUTOF10: 6

## 2020-11-01 NOTE — PROGRESS NOTES
Senior Purposeful Rounding    Position:Sitting    Physical Environment:Chair alarm in place and functioning    Pain Rating/ Nonverbal Pain Behaviors:denies; only with repositioning    Pain interventions Attempted: n/a    Patient Toileted:Incontinent

## 2020-11-01 NOTE — PROGRESS NOTES
Senior Purposeful Rounding    Position:Sitting    Physical Environment:Room free from clutter, Clear path to bathroom , No safety hazards noted and Chair alarm in place and functioning    Pain Rating/ Nonverbal Pain Behaviors:6;      Pain interventions Attempted: tramadol given at 0608    Patient Toileted:Incontinent

## 2020-11-01 NOTE — BH NOTE
Senior Purposeful Rounding    Position:Sitting    Physical Environment:No safety hazards noted and Chair alarm in place and functioning    Pain Rating/ Nonverbal Pain Behaviors:0;       Pain interventions Attempted: none    Patient Toileted: yes

## 2020-11-01 NOTE — GROUP NOTE
Group Therapy Note    Date: 11/1/2020    Group Start Time: 0687  Group End Time: 1120  Group Topic: Cognitive Skills    [facility]    Denise Velarde [facility]        Group Therapy Note    Attendees: 8         Patient's Goal:  Pt's goal was to learn 9 ways to find purpose as you age and how to apply them; find meaningful work, love or friendships, compassion for other, small joys, staying healthy, creative projects, contributing to improve the work, leave a legacy and bear suffering with brian, courage and dignity. Notes:  Pt was engaged in group by listening.       Status After Intervention:  Unchanged    Participation Level: Minimal    Participation Quality: Appropriate      Speech:  mute      Thought Process/Content: Perseverating      Affective Functioning: Congruent      Mood: euthymic      Level of consciousness:  Inattentive      Response to Learning: Resistant      Endings: None Reported    Modes of Intervention: Education, Support, Socialization, Exploration, Clarifying, Problem-solving, Activity, Movement, Confrontation, Limit-setting and Reality-testing      Discipline Responsible: /Counselor      Signature:  Denise Velarde, [facility]

## 2020-11-01 NOTE — PROGRESS NOTES
Senior Purposeful Rounding    Position:Left Side    Physical Environment:Bed alarm functioning    Pain Rating/ Nonverbal Pain Behaviors:denies; none observed    Pain interventions Attempted: repositioned, pillow support    Patient Toileted:Incontinent

## 2020-11-01 NOTE — PROGRESS NOTES
Senior Purposeful Rounding    Position:Left Side    Physical Environment:Room free from clutter, Clear path to bathroom , Bed alarm functioning and No safety hazards noted    Pain Rating/ Nonverbal Pain Behaviors:denies; n/a    Pain interventions Attempted: n/a    Patient Toileted:Incontinent

## 2020-11-01 NOTE — PROGRESS NOTES
Senior Purposeful Rounding    Position:Right Side    Physical Environment:Room free from clutter, Bed alarm functioning and No safety hazards noted    Pain Rating/ Nonverbal Pain Behaviors:0; none noted    Pain interventions Attempted: n/a    Patient Toileted:Incontinent

## 2020-11-01 NOTE — PLAN OF CARE
Problem: Falls - Risk of:  Goal: Will remain free from falls  Description: Will remain free from falls  11/1/2020 1151 by Michele Roque RN  Outcome: Ongoing  10/31/2020 2251 by Connie Wilkinson RN  Outcome: Ongoing     Problem: Altered Mood, Deterioration in Function:  Goal: Ability to perform activities of daily living will improve  Description: Ability to perform activities of daily living will improve  11/1/2020 1151 by Michele Roque RN  Outcome: Ongoing  10/31/2020 2251 by Connie Wilkinson RN  Outcome: Ongoing     Problem: Altered Mood, Deterioration in Function:  Goal: Maintenance of adequate nutrition will improve  Description: Maintenance of adequate nutrition will improve  Outcome: Ongoing     Problem: Altered Mood, Psychotic Behavior:  Goal: Able to demonstrate trust by eating, participating in treatment and following staff's direction  Description: Able to demonstrate trust by eating, participating in treatment and following staff's direction  Outcome: Ongoing     Problem: Pain:  Goal: Control of chronic pain  Description: Control of chronic pain  Outcome: Ongoing  Amaury Goodrichs was up in chair this morning, took meals at table with peers. She is able to feed herself, but states she hasn't been getting anything she likes too well yet. She denies any current pain, but reports she feels constipated and would like her Miralax. Order noted. Took morning meds without difficulty. Pleasant and cooperative.

## 2020-11-01 NOTE — BH NOTE
Senior Purposeful Rounding    Position:Sitting    Physical Environment:No safety hazards noted and Chair alarm in place and functioning    Pain Rating/ Nonverbal Pain Behaviors:0;       Pain interventions Attempted: none    Patient Toileted:Incontinent

## 2020-11-01 NOTE — BH NOTE
Senior Purposeful Rounding    Position:Sitting    Physical Environment:No safety hazards noted and Chair alarm in place and functioning    Pain Rating/ Nonverbal Pain Behaviors:0;       Pain interventions Attempted: none    Patient Toileted: no

## 2020-11-01 NOTE — PLAN OF CARE
Problem: Falls - Risk of:  Goal: Will remain free from falls  Description: Will remain free from falls  10/31/2020 2251 by Leandro Sosa RN  Outcome: Ongoing     Problem: Altered Mood, Deterioration in Function:  Goal: Ability to perform activities of daily living will improve  Description: Ability to perform activities of daily living will improve  10/31/2020 2251 by Leandro Sosa RN  Outcome: Ongoing    Pt has been cooperative with staff. She was out in the day room watching tv and on the phone with her son. She denies SI/HI/AVH. She said that she sometimes hears voices but not currently. Her BP was low at the beginning of the shift- 84/52. Rechecked and it was 120/65. Pt denied any dizziness. Pt refused her Procardia due to previous low blood pressure. She was assisted back to bed with two staff members. Pt is currently asleep in bed with fall precautions in place.  Electronically signed by Leandro Sosa RN on 10/31/2020 at 11:05 PM

## 2020-11-01 NOTE — BH NOTE
Senior Purposeful Rounding    Position:Sitting    Physical Environment:No safety hazards noted and Chair alarm in place and functioning    Pain Rating/ Nonverbal Pain Behaviors:0;       Pain interventions Attempted: none    Patient Toileted: toileted per request; +BM and urine; incontinence care done.  tolerated

## 2020-11-01 NOTE — PROGRESS NOTES
Senior Purposeful Rounding    Position:Sitting    Physical Environment:Chair alarm in place and functioning    Pain Rating/ Nonverbal Pain Behaviors:denies; only with repositioning    Pain interventions Attempted: pillow support    Patient Toileted:Incontinent

## 2020-11-02 PROCEDURE — 99233 SBSQ HOSP IP/OBS HIGH 50: CPT | Performed by: NURSE PRACTITIONER

## 2020-11-02 PROCEDURE — 6370000000 HC RX 637 (ALT 250 FOR IP): Performed by: PSYCHIATRY & NEUROLOGY

## 2020-11-02 PROCEDURE — 6370000000 HC RX 637 (ALT 250 FOR IP): Performed by: NURSE PRACTITIONER

## 2020-11-02 PROCEDURE — 99232 SBSQ HOSP IP/OBS MODERATE 35: CPT | Performed by: PHYSICIAN ASSISTANT

## 2020-11-02 PROCEDURE — 1240000000 HC EMOTIONAL WELLNESS R&B

## 2020-11-02 RX ORDER — RISPERIDONE 0.25 MG/1
0.25 TABLET, FILM COATED ORAL 2 TIMES DAILY
Status: DISCONTINUED | OUTPATIENT
Start: 2020-11-02 | End: 2020-11-04

## 2020-11-02 RX ORDER — LEVOTHYROXINE SODIUM 0.05 MG/1
50 TABLET ORAL
Status: DISCONTINUED | OUTPATIENT
Start: 2020-11-03 | End: 2020-11-09 | Stop reason: HOSPADM

## 2020-11-02 RX ADMIN — LOSARTAN POTASSIUM 100 MG: 100 TABLET, FILM COATED ORAL at 08:14

## 2020-11-02 RX ADMIN — METOPROLOL SUCCINATE 100 MG: 50 TABLET, EXTENDED RELEASE ORAL at 08:13

## 2020-11-02 RX ADMIN — POLYETHYLENE GLYCOL (3350) 17 G: 17 POWDER, FOR SOLUTION ORAL at 08:14

## 2020-11-02 RX ADMIN — LEVOTHYROXINE SODIUM 50 MCG: 50 TABLET ORAL at 08:13

## 2020-11-02 RX ADMIN — RISPERIDONE 0.25 MG: 0.25 TABLET ORAL at 20:36

## 2020-11-02 RX ADMIN — TRAMADOL HYDROCHLORIDE 50 MG: 50 TABLET, FILM COATED ORAL at 03:40

## 2020-11-02 RX ADMIN — TRAMADOL HYDROCHLORIDE 50 MG: 50 TABLET, FILM COATED ORAL at 20:36

## 2020-11-02 RX ADMIN — PANTOPRAZOLE SODIUM 40 MG: 40 TABLET, DELAYED RELEASE ORAL at 08:13

## 2020-11-02 RX ADMIN — ASPIRIN 81 MG: 81 TABLET, COATED ORAL at 08:14

## 2020-11-02 ASSESSMENT — PAIN DESCRIPTION - PAIN TYPE
TYPE: CHRONIC PAIN

## 2020-11-02 ASSESSMENT — PAIN DESCRIPTION - LOCATION
LOCATION: BACK;LEG;KNEE
LOCATION: BACK;KNEE;LEG

## 2020-11-02 ASSESSMENT — PAIN DESCRIPTION - DESCRIPTORS
DESCRIPTORS: ACHING;DISCOMFORT
DESCRIPTORS: ACHING;DISCOMFORT
DESCRIPTORS: ACHING
DESCRIPTORS: ACHING;DISCOMFORT

## 2020-11-02 ASSESSMENT — PAIN DESCRIPTION - PROGRESSION
CLINICAL_PROGRESSION: GRADUALLY WORSENING
CLINICAL_PROGRESSION: GRADUALLY IMPROVING
CLINICAL_PROGRESSION: GRADUALLY IMPROVING
CLINICAL_PROGRESSION: GRADUALLY WORSENING

## 2020-11-02 ASSESSMENT — PAIN DESCRIPTION - ONSET
ONSET: ON-GOING

## 2020-11-02 ASSESSMENT — PAIN SCALES - GENERAL
PAINLEVEL_OUTOF10: 6
PAINLEVEL_OUTOF10: 6
PAINLEVEL_OUTOF10: 2
PAINLEVEL_OUTOF10: 0
PAINLEVEL_OUTOF10: 2

## 2020-11-02 ASSESSMENT — PAIN DESCRIPTION - FREQUENCY
FREQUENCY: CONTINUOUS

## 2020-11-02 ASSESSMENT — PAIN - FUNCTIONAL ASSESSMENT
PAIN_FUNCTIONAL_ASSESSMENT: PREVENTS OR INTERFERES SOME ACTIVE ACTIVITIES AND ADLS

## 2020-11-02 NOTE — GROUP NOTE
Group Therapy Note    Date: 11/2/2020    Group Start Time: 1000  Group End Time: 8297  Group Topic: Psychoeducation    MAXINE Bharati [address], [facility]    Group Therapy Note    Attendees: 7    Patients learned about the importance of engaging in self care activities for wellness. Patients were provided handout to complete during group and the group discussed physical, emotional, social, and spiritual self care. Notes:  Pt was present for group though did not engage with peers or group activity.     Status After Intervention:  Unchanged    Participation Level: None    Participation Quality: Appropriate      Speech:  hesitant      Thought Process/Content: SNOW      Affective Functioning: Blunted      Mood: anxious      Level of consciousness:  Alert      Response to Learning: Progressing to goal      Endings: None Reported    Modes of Intervention: Education, Support, Socialization, Exploration, Clarifying, Problem-solving and Activity      Discipline Responsible: /Counselor      Signature:  ILIR Carmichael, [facility]

## 2020-11-02 NOTE — PROGRESS NOTES
Prn tramadol given for 6/10 back, leg, and knee pain. Medication was slightly effective along with rest and repositioning.

## 2020-11-02 NOTE — BH NOTE
Senior Purposeful Rounding    Position:Sitting    Physical Environment:No safety hazards noted    Pain Rating/ Nonverbal Pain Behaviors:denies; na    Pain interventions Attempted: na    Patient Toileted:Incontinent

## 2020-11-02 NOTE — PROGRESS NOTES
Department of Psychiatry  Progress Note    Admission Date: 10/30/2020    Chief Complaint / Reason for Admission: Psychosis     Patient's chart was reviewed, case was discussed with nursing/OT/RT staff, and collaborated with  about the treatment plan. SUBJECTIVE:   Over last 24 hours:  Behavioral outbursts: No  Non-aggressive behavioral disturbance: Yes  Medication compliant: Yes  Need for seclusion/restraints: No  Sleeping adequately: Yes  Appetite adequate: Yes  Attending groups: Select but does not engage much in group activities    Bambi العلي has been visible on the unit, seated in dayroom. She was cooperative with the interview but was very perseverative on the care being provided on the unit - reports that she's not getting her medication when they should be given, not having access to the phone all hours, unhappy with the food. She reports that she is in the hospital because she wanted to find out whether or not she has dementia and that she does not want to speak to a NP; she wants to speak to the physician. She did not verbalize overt paranoia or delusional content during my interactions with her. She gave verbal permission for me to call her son. Of note, it is documented by nursing staff that she was observed RTIS overnight and verbalized feeling paranoid. Spoke to patient's son, Adi, who is also reportedly her POA. He reports that the patient has a nurse come in once/week and this nurse told him that she needed to come to the hospital. For the past 3 months, the patient has been experiencing paranoia and delusional thoughts. He reports that she believes she is being poisoned and that there is someone in the home trying to kill her. As a result, she has not been eating food or taking her medications. He notes that this has been worsening over the past few weeks. In addition, she has been crying, believing someone killed either her son or brother.  She also believes Dimitris Stokes lives behind them and that him and the girl next door are conspiring to get her a haircut. He reports no known past psychiatric history outside of some depression after her daughter passed away but this was managed with an antidepressant. He reports no noticeable memory changes or concerns of dementia. Her previous medication of Zyprexa was prescribed by PCP but didn't seem to be doing any good so it was discontinued approximately one week ago. No known previous medication trials outside of Zyprexa; no known previous inpatient psychiatric admissions. We discussed the R/B/SE of antipsychotic medications and he states it is okay to use, \"at this point in time, it's a must\".     Suicidal ideation: None verbalized  Homicidal ideation: None verbalized  Medication side effects: None    ROS: Patient has new complaints: No    Current Medications Ordered:   polyethylene glycol  17 g Oral Daily    aspirin  81 mg Oral Daily    losartan  100 mg Oral Daily    metoprolol succinate  100 mg Oral Daily    NIFEdipine  30 mg Oral Nightly    levothyroxine  50 mcg Oral Daily    pantoprazole  40 mg Oral QAM AC      PRN Meds: acetaminophen, hydrOXYzine, OLANZapine **OR** OLANZapine, sterile water, traZODone, benztropine mesylate, magnesium hydroxide, aluminum & magnesium hydroxide-simethicone, LORazepam, traMADol, hydrALAZINE     Objective:     PE:    BP (!) 141/72   Pulse 62   Temp 97.1 °F (36.2 °C) (Temporal)   Resp 18   Ht 5' 5\" (1.651 m)   Wt 113 lb (51.3 kg)   SpO2 98%   BMI 18.80 kg/m²       Motor / Gait: Unable to ambulate, requires lift for transfer    Mental Status Examination:    Appearance: WF, appears stated age, in chair, wearing hospital attire, fair grooming and hygiene   Behavior/Attitude Toward Examiner: Underlying irritability but overall cooperative, fair eye contact  Speech: Spontaneous, normal rate, normal volume   Mood: \"I'm fine\"  Affect: Blunted  Thought Processes: Perseverative  Thought Content: No SI/HI verbalized, no overt paranoia or delusional content verbalized during assessment  Perceptions: No AVH verbalized, did not appear to be RTIS  Attention: Intact to interview  Cognition: Average RAQUEL, alert and oriented to person, place, time, recall grossly intact  Insight: Impaired insight   Judgment: Impaired judgment      LAB: Reviewed labs from last 24 hours      Dx:   Primary Psychiatric (DSM V) Diagnosis: Psychosis, unspecified  Secondary Psychiatric (DSM V) Diagnoses: Depression, per history   Chemical Dependency Diagnoses: None  Active Medical Diagnoses: Hypothyroidism, elevated TSH, HTN, GERD, PAF, chronic diastolic CHF, chronic low back pain, mild CAD    All conditions detailed above are being treated while patient is hospitalized. Tx Plan: Generally: prevent self injury/aggression towards others, stabilize mood/anxiety/psychotic/behavioral disturbance, establish/maintain aftercare, increase coping mechanisms, improve medication compliance. All conditions present on admission are being treated while pt is hospitalized. Legal Status: Voluntary    Primary Psychiatric Issues:   1. Psychosis, unspecified  - Trial Risperdal for paranoia, delusional thinking. R/B/SE reviewed with patient's son/POA, who gave verbal permission to start medication    Chemical Dependency Issues:  - No issues    Function:  - Consulted physical therapy - appreciate recs  - Consulted occupational therapy - appreciate recs  - Falls precautions    Medical Problems:  Internal medicine has been consulted. Appreciate recs.     Hypothyroidism  Elevated TSH  - TSH 66.8, free T3: 1.4, check free T4  - pt endorses medication compliance  - home dose of synthroid 50 mcg, previously on 25 mcg  - will cont Synthroid here, will need repeat TSH in 4-6 weeks OP     HTN  - initially elevated, has since trended down  - cont Cozaar, Toprol XL, Procardia  - monitor     GERD  - cont Protonix     PAF  - NSR on EKG  - no AC - reason unclear  - cont BB     Chronic Diastolic CHF  - appears compensated  - low Na diet, daily weights  - cont BB; no diuretics on home med list     Chronic Low Back Pain  - PRN Ultram     Mild CAD  - no c/o chest pain  - cont ASA, BB    Code Status: Full Code    Disposition:    - Housing: Lives with son  - Current outpatient follow-up: PCP  - Discharge planning is incomplete    Estimated Length of Stay: 5-7 days     Criteria for Discharge: Not suicidal, not homicidal, not grossly psychotic, behavioral disturbance improved, sleeping well, mood/affect stable, eating well, aftercare arranged. Total face to face time with patient was 30 minutes and more than 50% of that time was spent counseling the patient on their symptoms, treatment and expected goals.     Macy Gill, MPH, PMHNP-BC  11/02/20

## 2020-11-02 NOTE — BH NOTE
Pt alert and oriented to person, place and time. Disoriented to situation. She has been pleasant and cooperative. Medication compliant. Appetite fair. Incontinent of B/B. Spoke to son on phone. +group. Denies SI/HI/AVH.

## 2020-11-02 NOTE — PROGRESS NOTES
Senior Purposeful Rounding    Position:Left Side    Physical Environment:Room free from clutter, Clear path to bathroom , Bed alarm functioning and No safety hazards noted    Pain Rating/ Nonverbal Pain Behaviors:6; n/a    Pain interventions Attempted: repositioned, pillow support    Patient Toileted:Incontinent

## 2020-11-02 NOTE — PROGRESS NOTES
Senior Purposeful Rounding    Position:Right Side    Physical Environment:Room free from clutter, Clear path to bathroom , Bed alarm functioning and No safety hazards noted    Pain Rating/ Nonverbal Pain Behaviors:2; n/a    Pain interventions Attempted: prn tramadol given     Patient Toileted:Incontinent

## 2020-11-02 NOTE — PLAN OF CARE
Problem: Falls - Risk of:  Goal: Will remain free from falls  Description: Will remain free from falls  11/1/2020 2232 by Rui Miranda RN  Outcome: Ongoing     Problem: Altered Mood, Deterioration in Function:  Goal: Ability to perform activities of daily living will improve  Description: Ability to perform activities of daily living will improve  11/1/2020 2232 by Rui Miranda RN  Outcome: Ongoing    Pt has been pleasant and cooperative with staff. She denies SI/HI/AVH but pt was seen responding to internal stimuli by talking to herself in her room. She said that she felt paranoid and, \"know people are after me. \" She said that she felt safe while on the unit. She talked to her son on the phone. She was medication compliant. She requested prn tramadol at 2057 for 6/10 pain in her lower back. Medication was effective. Pt is currently asleep in bed with fall precautions in place.  Electronically signed by Rui Miranda RN on 11/1/2020 at 10:37 PM

## 2020-11-02 NOTE — PROGRESS NOTES
Senior Purposeful Rounding    Position:Left Side    Physical Environment:Bed alarm functioning    Pain Rating/ Nonverbal Pain Behaviors:6;     Pain interventions Attempted: prn tramadol given    Patient Toileted:Incontinent, changed

## 2020-11-02 NOTE — H&P
Psychiatric Admission Evaluation       Admission Date:    10/30/2020  Identifying Info:   Patient is a 80 y.o. female with hx of dementia   Patient was admitted to psychiatric unit on a voluntarily basis. Chief complaint:  psychosis    HPI: 79 y/o wf without psych hx that was brought in accompanied by son for progression of her dementia and behavioral disturbances at home. Per son she has been more confused and has seen a declined for past 18 months. He stated that she had a fall at that time and she has not been able to walk since that time despite they were no injuries. Pt has been more paranoid against care taker and believes that he is bringing women inside the house that are stealing from her and that they are trying to poison her and this has affected her daily intake of food. Pt has been having a/v/h and believes that there is a lady name Zee dtr in law that is trying to kill her but she does not exist. Pt has not been sleeping well and she was not able to fully participate interview. Pt was recently started on zyprexa but stopped due to be ineffective. current medications    Prior to Admission medications    Medication Sig Start Date End Date Taking? Authorizing Provider   levothyroxine (SYNTHROID) 25 MCG tablet Take 2 tablets by mouth Daily 5/14/20  Yes Paige Moreno MD   traMADol (ULTRAM) 50 MG tablet Take 50 mg by mouth every 6 hours as needed for Pain. Yes Historical Provider, MD   ALPRAZolam Arlene Pies) 0.25 MG tablet Take 0.25 mg by mouth nightly as needed for Sleep.    Yes Historical Provider, MD   aspirin 81 MG EC tablet Take 81 mg by mouth   Yes Historical Provider, MD   NIFEdipine (ADALAT CC) 30 MG extended release tablet TAKE 1 TABLET BY MOUTH NIGHTLY 9/17/19  Yes Historical Provider, MD   losartan (COZAAR) 100 MG tablet TAKE 1 TABLET EVERY DAY 2/26/18  Yes Agustin Shetty Nelson, DO   metoprolol succinate (TOPROL XL) 100 MG extended release tablet TAKE 1 TABLET EVERY DAY 2/26/18  Yes Agustin Shetty Nelson, DO   omeprazole (PRILOSEC) 20 MG delayed release capsule TAKE 1 CAPSULE EVERY DAY 11/16/17  Yes David Gay Nelson, DO   hydrALAZINE (APRESOLINE) 100 MG tablet Take 1 tablet by mouth 3 times daily as needed (SBP > 160 mmHg) 5/14/20   Yuri Knight MD       Past psychiatric and medical history:  Hosp:unknown, none in chartOutpatientTx: PCP Dr Gloria Butterfield Abuse History: none reported      Social Hx: Pt lives with her son. Pt is  and was  for 27 + yrs. She has two grown sons. She receives ss. Pt hs [place]. She denies hx of abuse. No legla issues     Family Mental Health Hx:   None reported    Mental Status Examination:    Level of consciousness:  Severe dysattention (reduced clarity of awareness with impaired ability to focus, sustain, or shift attention) and somnolent  Appearance:  ill-appearing, hospital attire, lying in bed, fair grooming and fair hygiene well-developed, well-nourished  Behavior/Motor:  psychomotor retardation pt does not ambulate AIMS: 0  Attitude toward examiner:  Pt sedated and in bed  Speech:  slow Mood:  ok Affect:  flat Hallucinations: a/v/h Thought processes:  slow  Attention: attention span appeared shorter than expected for age Thought content:  Paranoid delusions  Abstraction: impair  OCD: none   Insight: impaired insight Judgement: impaired judgment  Cognition:  [address] of Knowledge: average   IQ: average Memory: SNOW  Suicide:  no specific plan to harm self Sleep: has difficulty falling asleep Appetite: not normal   Inventory of strengths and weaknesses:Family support  ROS:  See Medical H&PE    PE:  BP (!) 140/71   Pulse 69   Temp 97.9 °F (36.6 °C) (Oral)   Resp 18   Ht 5' 5\" (1.651 m)   Wt 113 lb (51.3 kg)   SpO2 97%   BMI 18.80 kg/m²     Cranial Nerves: 1-12 appear to be intact .   LAB:   Admission on 10/30/2020   Component Date Value Ref Range Status    WBC 10/30/2020 12.3* 4.0 - 11.0 K/uL Final    RBC 10/30/2020 4.03  4.00 - 5.20 M/uL Final  Hemoglobin 10/30/2020 12.7  12.0 - 16.0 g/dL Final    Hematocrit 10/30/2020 38.1  36.0 - 48.0 % Final    MCV 10/30/2020 94.6  80.0 - 100.0 fL Final    MCH 10/30/2020 31.5  26.0 - 34.0 pg Final    MCHC 10/30/2020 33.3  31.0 - 36.0 g/dL Final    RDW 10/30/2020 14.5  12.4 - 15.4 % Final    Platelets 91/52/1606 311  135 - 450 K/uL Final    MPV 10/30/2020 7.7  5.0 - 10.5 fL Final    Neutrophils % 10/30/2020 63.5  % Final    Lymphocytes % 10/30/2020 18.2  % Final    Monocytes % 10/30/2020 5.8  % Final    Eosinophils % 10/30/2020 11.7  % Final    Basophils % 10/30/2020 0.8  % Final    Neutrophils Absolute 10/30/2020 7.8* 1.7 - 7.7 K/uL Final    Lymphocytes Absolute 10/30/2020 2.2  1.0 - 5.1 K/uL Final    Monocytes Absolute 10/30/2020 0.7  0.0 - 1.3 K/uL Final    Eosinophils Absolute 10/30/2020 1.4* 0.0 - 0.6 K/uL Final    Basophils Absolute 10/30/2020 0.1  0.0 - 0.2 K/uL Final    Sodium 10/30/2020 132* 136 - 145 mmol/L Final    Potassium reflex Magnesium 10/30/2020 4.4  3.5 - 5.1 mmol/L Final    Chloride 10/30/2020 96* 99 - 110 mmol/L Final    CO2 10/30/2020 27  21 - 32 mmol/L Final    Anion Gap 10/30/2020 9  3 - 16 Final    Glucose 10/30/2020 119* 70 - 99 mg/dL Final    BUN 10/30/2020 24* 7 - 20 mg/dL Final    CREATININE 10/30/2020 0.7  0.6 - 1.2 mg/dL Final    GFR Non- 10/30/2020 >60  >60 Final    Comment: >60 mL/min/1.73m2 EGFR, calc. for ages 25 and older using the  MDRD formula (not corrected for weight), is valid for stable  renal function.  GFR  10/30/2020 >60  >60 Final    Comment: Chronic Kidney Disease: less than 60 ml/min/1.73 sq.m. Kidney Failure: less than 15 ml/min/1.73 sq.m. Results valid for patients 18 years and older.       Calcium 10/30/2020 9.1  8.3 - 10.6 mg/dL Final    Total Protein 10/30/2020 6.1* 6.4 - 8.2 g/dL Final    Alb 10/30/2020 3.4  3.4 - 5.0 g/dL Final    Albumin/Globulin Ratio 10/30/2020 1.3  1.1 - 2.2 Final    Total Bilirubin 10/30/2020 0.4  0.0 - 1.0 mg/dL Final    Alkaline Phosphatase 10/30/2020 100  40 - 129 U/L Final    ALT 10/30/2020 9* 10 - 40 U/L Final    AST 10/30/2020 13* 15 - 37 U/L Final    Globulin 10/30/2020 2.7  g/dL Final    Color, UA 10/30/2020 Yellow  Straw/Yellow Final    Clarity, UA 10/30/2020 SL CLOUDY* Clear Final    Glucose, Ur 10/30/2020 Negative  Negative mg/dL Final    Bilirubin Urine 10/30/2020 Negative  Negative Final    Ketones, Urine 10/30/2020 Negative  Negative mg/dL Final    Specific [place], UA 10/30/2020 1.020  1.005 - 1.030 Final    Blood, Urine 10/30/2020 TRACE-INTACT* Negative Final    pH, UA 10/30/2020 7.0  5.0 - 8.0 Final    Protein, UA 10/30/2020 Negative  Negative mg/dL Final    Urobilinogen, Urine 10/30/2020 1.0  <2.0 E.U./dL Final    Nitrite, Urine 10/30/2020 Negative  Negative Final    Leukocyte Esterase, Urine 10/30/2020 Negative  Negative Final    Microscopic Examination 10/30/2020 YES   Final    Urine Type 10/30/2020 NotGiven   Final    Urine received in a container without preservatives.     Urine Reflex to Culture 10/30/2020 Not Indicated   Final    T3, Free 10/30/2020 1.4* 2.3 - 4.2 pg/mL Final    TSH 10/30/2020 66.80* 0.27 - 4.20 uIU/mL Final    Ventricular Rate 10/30/2020 58  BPM Final    Atrial Rate 10/30/2020 58  BPM Final    P-R Interval 10/30/2020 178  ms Final    QRS Duration 10/30/2020 78  ms Final    Q-T Interval 10/30/2020 432  ms Final    QTc Calculation (Bazett) 10/30/2020 424  ms Final    P Axis 10/30/2020 45  degrees Final    R Axis 10/30/2020 49  degrees Final    T Axis 10/30/2020 63  degrees Final    Diagnosis 10/30/2020 Sinus bradycardiaBaseline artifactSeptal infarct , age undeterminedNo previous ECGs availableConfirmed by GERARDO Ziegler MD ([id number]) on 10/30/2020 5:59:04 PM   Final    Protime 10/30/2020 11.2  10.0 - 13.2 sec Final    Comment: Effective 11-19-19 09:00am EST  Please note reference ranges have  changed for PT progression of dementing process and displaying more paranoia and inability to care for self.     Dx: axis I: dementia unspecified with behavioral distrubances  Axis 2: deferred   [place] 3: See Medical History  Patient Active Problem List    Diagnosis Date Noted    PAF (paroxysmal atrial fibrillation) ([address]     Gastroesophageal reflux disease without esophagitis     Psychosis ([address] 10/30/2020    Acute kidney injury ([address]     Hallucinations     Myoclonus     Acute metabolic encephalopathy 61/68/0976    SOB (shortness of breath) 11/11/2019    Fall at home, initial encounter 11/08/2019    Chronic abdominal pain     Functional diarrhea 04/19/2019    Displacement of lumbar intervertebral disc without myelopathy 05/07/2018    Degeneration of lumbar or lumbosacral intervertebral disc 05/07/2018    Lumbosacral spondylosis without myelopathy 05/07/2018    Infective urethritis 06/22/2017    Abdominal bloating 06/20/2017    Acute cystitis with hematuria 06/20/2017    Thoracic compression fracture ([address] 06/15/2017    Urinary tract infection without hematuria 06/15/2017    Constipation 06/08/2017    Acute bilateral low back pain with bilateral sciatica 06/08/2017    Chronic right shoulder pain 04/06/2017    Left foot pain 04/06/2017    Postoperative hypothyroidism 04/06/2017    Chest pain 02/20/2017    Hypothyroid 02/20/2017    Hyponatremia 02/20/2017    Ovarian cancer ([address] 02/20/2017    Anxiety 12/08/2016    Cystitis 08/18/2016    Grief reaction 07/11/2016    Essential hypertension 06/20/2016    Hematuria 06/20/2016    And Present on Admission:   Psychosis ([address]   PAF (paroxysmal atrial fibrillation) ([address]   Gastroesophageal reflux disease without esophagitis     Past Medical History:   Diagnosis Date    Acute bilateral low back pain with bilateral sciatica 6/8/2017    Acute kidney injury ([address]     Anxiety     Cancer ([address]     ovarian cancer    Degeneration of lumbar or lumbosacral intervertebral disc 5/7/2018    Depression     Gastroesophageal reflux disease without esophagitis     Hypertension     Hypothyroid 2/20/2017    Thyroid disease      Axis 4: Other psychosocial and environmental problems      Tx plan:      prevent self injury, stabilize affect, restore sleep, treat depression, treat psychosis, establish/maintain aftercare. All conditions present on admission are being treated while pt is hospitalized.      Medications  Current Facility-Administered Medications   Medication Dose Route Frequency Provider Last Rate Last Dose    polyethylene glycol (GLYCOLAX) packet 17 g  17 g Oral Daily Slava Serrano MD   17 g at 11/01/20 0944    acetaminophen (TYLENOL) tablet 650 mg  650 mg Oral Q4H PRN Lisa Padilla MD        hydrOXYzine (VISTARIL) capsule 50 mg  50 mg Oral Q6H PRN Lisa Padilla MD        OLANZapine [facility]) tablet 5 mg  5 mg Oral Q4H PRN Lisa Padilla MD        Or    OLANZapine [facility]) injection 10 mg  10 mg Intramuscular TID PRN Lisa Padilla MD        sterile water injection 2.1 mL  2.1 mL Intramuscular TID PRN Lisa Padilla MD        traZODone (DESYREL) tablet 50 mg  50 mg Oral Nightly PRN Lisa Padilla MD        benztropine mesylate (COGENTIN) injection 2 mg  2 mg Intramuscular BID PRN Lisa Padilla MD        magnesium hydroxide (MILK OF MAGNESIA) 400 MG/5ML suspension 30 mL  30 mL Oral Daily PRN Lisa Padilla MD        aluminum & magnesium hydroxide-simethicone (MAALOX) 200-200-20 MG/5ML suspension 30 mL  30 mL Oral Q6H PRN Lisa Padilla MD        LORazepam (ATIVAN) tablet 0.5 mg  0.5 mg Oral Q4H PRN Lisa Padilla MD   0.5 mg at 10/31/20 0032    traMADol (ULTRAM) tablet 50 mg  50 mg Oral Q6H PRN Lisa Padilla MD   50 mg at 11/01/20 2057    aspirin EC tablet 81 mg  81 mg Oral Daily Lisa Padilla MD   81 mg at 11/01/20 0850    hydrALAZINE (APRESOLINE) tablet 100 mg  100 mg Oral TID PRN Jey Will MD        losartan (COZAAR) tablet 100 mg  100 mg Oral Daily Jey Will MD   100 mg at 11/01/20 0850    metoprolol succinate (TOPROL XL) extended release tablet 100 mg  100 mg Oral Daily Jey Will MD   100 mg at 11/01/20 0850    NIFEdipine (PROCARDIA XL) extended release tablet 30 mg  30 mg Oral Nightly Jey Will MD   30 mg at 11/01/20 2057    levothyroxine (SYNTHROID) tablet 50 mcg  50 mcg Oral Daily Jey Will MD   50 mcg at 11/01/20 0502    pantoprazole (PROTONIX) tablet 40 mg  40 mg Oral QAM AC Jey Will MD   40 mg at 11/01/20 0502      polyethylene glycol  17 g Oral Daily    aspirin  81 mg Oral Daily    losartan  100 mg Oral Daily    metoprolol succinate  100 mg Oral Daily    NIFEdipine  30 mg Oral Nightly    levothyroxine  50 mcg Oral Daily    pantoprazole  40 mg Oral QAM AC      PRN Meds: acetaminophen, hydrOXYzine, OLANZapine **OR** OLANZapine, sterile water, traZODone, benztropine mesylate, magnesium hydroxide, aluminum & magnesium hydroxide-simethicone, LORazepam, traMADol, hydrALAZINE   Estimated length of stay: per primary team  Prognosis:  guarded   Criteria for Discharge:    Not suicidal, sleeping well, affect stable, depression improving, eating well, aftercare arranged.      History and Physical Dictated  Spent at least 70 minutes with evaluation process and more than 50% of the time was spent obtaining history and planning treatment with the patient  Dx:

## 2020-11-02 NOTE — PROGRESS NOTES
Senior Purposeful Rounding    Position:Right Side    Physical Environment:Bed alarm functioning    Pain Rating/ Nonverbal Pain Behaviors:2; n/a    Pain interventions Attempted: repositioned    Patient Toileted:Incontinent

## 2020-11-02 NOTE — PROGRESS NOTES
CAD  - no c/o chest pain  - cont ASA, BB    #BLE edema  - check dopplers    #Hyponatremia  - appears chronic on review of past labs.   Could be related to hypothyroidism  - PCP f/u    Zenaida Dan PA-C   3:47 PM 11/2/2020

## 2020-11-02 NOTE — PROGRESS NOTES
Senior Purposeful Rounding    Position:Repositions Self    Physical Environment:Bed alarm functioning    Pain Rating/ Nonverbal Pain Behaviors:2; n/a    Pain interventions Attempted: rest, repositioned    Patient Toileted:Incontinent

## 2020-11-02 NOTE — PROGRESS NOTES
Senior Purposeful Rounding    Position:Back    Physical Environment:Bed alarm functioning    Pain Rating/ Nonverbal Pain Behaviors:2; n/a    Pain interventions Attempted: repositioned, pillow support    Patient Toileted:Incontinent

## 2020-11-02 NOTE — BH NOTE
Senior Purposeful Rounding    Position:Sitting    Physical Environment:No safety hazards noted    Pain Rating/ Nonverbal Pain Behaviors:denies; none    Pain interventions Attempted: na    Patient Toileted:No- Void

## 2020-11-03 PROCEDURE — 1240000000 HC EMOTIONAL WELLNESS R&B

## 2020-11-03 PROCEDURE — 97166 OT EVAL MOD COMPLEX 45 MIN: CPT

## 2020-11-03 PROCEDURE — 6370000000 HC RX 637 (ALT 250 FOR IP): Performed by: NURSE PRACTITIONER

## 2020-11-03 PROCEDURE — 6370000000 HC RX 637 (ALT 250 FOR IP): Performed by: PSYCHIATRY & NEUROLOGY

## 2020-11-03 PROCEDURE — 93970 EXTREMITY STUDY: CPT

## 2020-11-03 PROCEDURE — 97535 SELF CARE MNGMENT TRAINING: CPT

## 2020-11-03 PROCEDURE — 97530 THERAPEUTIC ACTIVITIES: CPT

## 2020-11-03 PROCEDURE — 99233 SBSQ HOSP IP/OBS HIGH 50: CPT | Performed by: NURSE PRACTITIONER

## 2020-11-03 PROCEDURE — 6370000000 HC RX 637 (ALT 250 FOR IP): Performed by: PHYSICIAN ASSISTANT

## 2020-11-03 PROCEDURE — 97110 THERAPEUTIC EXERCISES: CPT

## 2020-11-03 PROCEDURE — 99232 SBSQ HOSP IP/OBS MODERATE 35: CPT | Performed by: PHYSICIAN ASSISTANT

## 2020-11-03 RX ADMIN — ASPIRIN 81 MG: 81 TABLET, COATED ORAL at 08:49

## 2020-11-03 RX ADMIN — LEVOTHYROXINE SODIUM 50 MCG: 50 TABLET ORAL at 08:49

## 2020-11-03 RX ADMIN — POLYETHYLENE GLYCOL (3350) 17 G: 17 POWDER, FOR SOLUTION ORAL at 08:49

## 2020-11-03 RX ADMIN — RISPERIDONE 0.25 MG: 0.25 TABLET ORAL at 08:49

## 2020-11-03 RX ADMIN — PANTOPRAZOLE SODIUM 40 MG: 40 TABLET, DELAYED RELEASE ORAL at 08:49

## 2020-11-03 RX ADMIN — METOPROLOL SUCCINATE 100 MG: 50 TABLET, EXTENDED RELEASE ORAL at 08:49

## 2020-11-03 RX ADMIN — TRAMADOL HYDROCHLORIDE 50 MG: 50 TABLET, FILM COATED ORAL at 03:36

## 2020-11-03 RX ADMIN — NIFEDIPINE 30 MG: 30 TABLET, FILM COATED, EXTENDED RELEASE ORAL at 20:17

## 2020-11-03 RX ADMIN — LOSARTAN POTASSIUM 100 MG: 100 TABLET, FILM COATED ORAL at 08:49

## 2020-11-03 RX ADMIN — RISPERIDONE 0.25 MG: 0.25 TABLET ORAL at 20:17

## 2020-11-03 ASSESSMENT — PAIN DESCRIPTION - PROGRESSION
CLINICAL_PROGRESSION: GRADUALLY WORSENING
CLINICAL_PROGRESSION: GRADUALLY IMPROVING

## 2020-11-03 ASSESSMENT — PAIN DESCRIPTION - ONSET
ONSET: ON-GOING
ONSET: ON-GOING

## 2020-11-03 ASSESSMENT — PAIN DESCRIPTION - FREQUENCY
FREQUENCY: CONTINUOUS
FREQUENCY: CONTINUOUS

## 2020-11-03 ASSESSMENT — PAIN - FUNCTIONAL ASSESSMENT
PAIN_FUNCTIONAL_ASSESSMENT: PREVENTS OR INTERFERES SOME ACTIVE ACTIVITIES AND ADLS
PAIN_FUNCTIONAL_ASSESSMENT: PREVENTS OR INTERFERES SOME ACTIVE ACTIVITIES AND ADLS

## 2020-11-03 ASSESSMENT — PAIN DESCRIPTION - LOCATION
LOCATION: BACK;KNEE;LEG
LOCATION: BACK;LEG;KNEE

## 2020-11-03 ASSESSMENT — PAIN DESCRIPTION - PAIN TYPE
TYPE: CHRONIC PAIN
TYPE: CHRONIC PAIN

## 2020-11-03 ASSESSMENT — PAIN DESCRIPTION - DESCRIPTORS
DESCRIPTORS: ACHING;DISCOMFORT
DESCRIPTORS: ACHING

## 2020-11-03 ASSESSMENT — PAIN SCALES - GENERAL
PAINLEVEL_OUTOF10: 6
PAINLEVEL_OUTOF10: 2

## 2020-11-03 NOTE — PROGRESS NOTES
Pt woke up and was incontinent of urine. Depends changed. Repositioned onto left side. Prn tramadol given for 6/10 back, bilateral leg and knee pain. Medication was effective along with rest and repositioning.

## 2020-11-03 NOTE — PROGRESS NOTES
Senior Purposeful Rounding    Position: Back    Physical Environment:Bed alarm functioning    Pain Rating/ Nonverbal Pain Behaviors:2; n/a    Pain interventions Attempted: repositioned    Patient Toileted:Incontinent

## 2020-11-03 NOTE — PROGRESS NOTES
Senior Purposeful Rounding    Position:Left Side    Physical Environment:Bed alarm functioning    Pain Rating/ Nonverbal Pain Behaviors:2; n/a    Pain interventions Attempted: prn tramadol given, rest, repositioned    Patient Toileted:Incontinent, small BM

## 2020-11-03 NOTE — BH NOTE
Karli Joseph was excused from [address] [place] [address] due to meeting with OT staff at time of invite.     Gutierrez Carr, MT-BC

## 2020-11-03 NOTE — PROGRESS NOTES
Senior Purposeful Rounding    Position:Right Side    Physical Environment:Bed alarm functioning    Pain Rating/ Nonverbal Pain Behaviors:2; n/a    Pain interventions Attempted: n/a    Patient Toileted:Incontinent

## 2020-11-03 NOTE — PLAN OF CARE
Problem: Falls - Risk of:  Goal: Will remain free from falls  Description: Will remain free from falls  Outcome: Ongoing  Goal: Absence of physical injury  Description: Absence of physical injury  Outcome: Ongoing     Problem: Altered Mood, Deterioration in Function:  Goal: Ability to perform activities of daily living will improve  Description: Ability to perform activities of daily living will improve  Outcome: Ongoing  Goal: Able to verbalize reality based thinking  Description: Able to verbalize reality based thinking  Outcome: Ongoing    Pt is alert and oriented to person, place, and time. Pleasant and cooperative. No paranoia or delusions this shift. Med compliant. Good appetite. Incontinent of bowel and bladder. BM x1. Total assist w/ transfers. Denies SI/HI/AVH, no RTIS. Denies any needs at this time.

## 2020-11-03 NOTE — PROGRESS NOTES
Department of Psychiatry  Progress Note    Admission Date: 10/30/2020    Chief Complaint / Reason for Admission: Psychosis     Patient's chart was reviewed, case was discussed with nursing/OT/RT staff, and collaborated with  about the treatment plan. SUBJECTIVE:   Over last 24 hours:  Behavioral outbursts: No  Non-aggressive behavioral disturbance: Yes  Medication compliant: Yes  Need for seclusion/restraints: No  Sleeping adequately: Yes  Appetite adequate: Yes  Attending groups: Select but does not engage much in group activities    Moises Paz was in bed, asleep when I attempted to meet with her today. Both times I tried to meet with her, she did not awaken to voice (patient is hard of hearing) and stirred but did not awaken when I touched her arm. Chart review indicates that he was pleasant and cooperative last night, but disoriented to situation. Incontinent overnight. Denied SI/HI, AVH, and did not appear to be RTIS. No behavioral or verbal outbursts, no overt delusional content or paranoia documented.      Suicidal ideation: None verbalized  Homicidal ideation: None verbalized  Medication side effects: None    ROS: Patient has new complaints: No    Current Medications Ordered:   levothyroxine  50 mcg Oral QAM AC    risperiDONE  0.25 mg Oral BID    polyethylene glycol  17 g Oral Daily    aspirin  81 mg Oral Daily    losartan  100 mg Oral Daily    metoprolol succinate  100 mg Oral Daily    NIFEdipine  30 mg Oral Nightly    pantoprazole  40 mg Oral QAM AC      PRN Meds: acetaminophen, hydrOXYzine, OLANZapine **OR** OLANZapine, sterile water, traZODone, benztropine mesylate, magnesium hydroxide, aluminum & magnesium hydroxide-simethicone, LORazepam, traMADol, hydrALAZINE     Objective:     PE:    /65   Pulse 63   Temp 97.9 °F (36.6 °C) (Temporal)   Resp 18   Ht 5' 5\" (1.651 m)   Wt 113 lb (51.3 kg)   SpO2 98%   BMI 18.80 kg/m²       Motor / Gait: Unable to ambulate, requires lift issues    Function:  - Consulted physical therapy - appreciate recs  - Consulted occupational therapy - appreciate recs  - Falls precautions    Medical Problems:  Internal medicine has been consulted. Appreciate recs. Hypothyroidism  Elevated TSH  - TSH 66.8, free T3: 1.4, check free T4  - pt endorses medication compliance  - home dose of synthroid 50 mcg, previously on 25 mcg  - will cont Synthroid here, will need repeat TSH in 4-6 weeks OP     HTN  - initially elevated, has since trended down  - cont Cozaar, Toprol XL, Procardia  - monitor     GERD  - cont Protonix     PAF  - NSR on EKG  - no AC - reason unclear  - cont BB     Chronic Diastolic CHF  - appears compensated  - low Na diet, daily weights  - cont BB; no diuretics on home med list     Chronic Low Back Pain  - PRN Ultram     Mild CAD  - no c/o chest pain  - cont ASA, BB    Code Status: Full Code    Disposition:    - Housing: Lives with son  - Current outpatient follow-up: PCP  - Discharge planning is incomplete    Estimated Length of Stay: 5-7 days     Criteria for Discharge: Not suicidal, not homicidal, not grossly psychotic, behavioral disturbance improved, sleeping well, mood/affect stable, eating well, aftercare arranged. Total face to face time with patient was 30 minutes and more than 50% of that time was spent counseling the patient on their symptoms, treatment and expected goals.     Alexandr Saleem, MPH, PMHNP-BC  11/03/20

## 2020-11-03 NOTE — PROGRESS NOTES
Inpatient Occupational Therapy  Evaluation and Treatment    Unit:   [facility]  Date:  11/3/2020  Patient Name:    Lord Herrera  Admitting diagnosis:  Psychosis, unspecified psychosis type [facility]) Willean Prader Date:  10/30/2020  Precautions/Restrictions/WB Status/ Lines/ Wounds/ Oxygen:  Standard BHI Precautions  Fall Risk  [place] (hard of hearing)     History of Present Illness: (per H&P) 81 y/o wf without psych hx that was brought in accompanied by son for progression of her dementia and behavioral disturbances at home. Per son she has been more confused and has seen a declined for past 18 months. He stated that she had a fall at that time and she has not been able to walk since that time despite they were no injuries. Pt has been more paranoid against care taker and believes that he is bringing women inside the house that are stealing from her and that they are trying to poison her and this has affected her daily intake of food. Pt has been having a/v/h and believes that there is a lady name Zee dtr in law that is trying to kill her but she does not exist. Pt has not been sleeping well and she was not able to fully participate interview. Pt was recently started on zyprexa but stopped due to be ineffective.  (per Arelis SEGUNDO's 11/2/20 note) Spoke to patient's son, Lino Thakkar, who is also reportedly her POA. He reports that the patient has a nurse come in once/week and this nurse told him that she needed to come to the hospital. For the past 3 months, the patient has been experiencing paranoia and delusional thoughts. He reports that she believes she is being poisoned and that there is someone in the home trying to kill her. As a result, she has not been eating food or taking her medications. He notes that this has been worsening over the past few weeks. In addition, she has been crying, believing someone killed either her son or brother.  She also believes Iván Listen lives behind them and that him and the girl next door are conspiring to get her a haircut. He reports no known past psychiatric history outside of some depression after her daughter passed away but this was managed with an antidepressant. He reports no noticeable memory changes or concerns of dementia. Her previous medication of Zyprexa was prescribed by PCP but didn't seem to be doing any good so it was discontinued approximately one week ago. No known previous medication trials outside of Zyprexa; no known previous inpatient psychiatric admissions. We discussed the R/B/SE of antipsychotic medications and he states it is okay to use, \"at this point in time, it's a must\".     Treatment Number:  1    Treatment Time: 820-909  Timed Code Treatment Minutes: 39    minutes   Total Treatment Time:    49  minutes    Staff Recommendations: Total assist with transfers      Patient Goals for Therapy:  \" to get better \"    Discharge Recommendations:  Home with assist 24/7    DME needs for discharge:      Needs Met     AM-PAC Score:   10  Home Health S4 Level: NA    MOCA:  To be assessed      Preadmission Environment    Pt. Lives with family (son)  Home environment:  one story home  Steps to enter first floor: 1 steps to enter and no handrail  Steps to second floor: N/A  Bathroom: tub/shower unit and grab bars  Equipment owned: rollator and wc (manual)    Preadmission Status:  Pt. Able to drive: No  Pt Fully independent with ADLs: No-Assist to get into the shower-pt mostly bathes self. Son gets her pants over her feet, pt pulls them up while rolling in bed. Pt. Required assistance from family for: Bathing, Cleaning, Cooking, Dressing and Laundry   Pt. required assist: max A for transfers and gait and was non-ambulatory x 6 months  History of falls None recent.  Had bad fall over a year ago and has been very fearful of walking since    Sleep Hygiene:  Pain keeps her awake along with restless leg syndrome  Income: SSI and half-way funds  Financial Management:  Son manages Her Belief that she is unable to do certain things (standing, scooting forward, etc)     At end of evaluation, pt. In gathering room. Goal(s) : To be met in 3 Visits:  1). Pt will verbalize 3 coping skills  2). Pt will participate in [facility] REHABILITATION assessment. To be met in 5 Visits:  1). Supine to Sit mod A  2). Bed to Chair/BSC with Max A of 1  3). Upper Body Bathing with setup  4). Pt to catrachita UE exs [id number]  5). Pt. To complete interest check list.   4). Pt. To verbalize understanding of sleep hygiene education/handouts. Rehabilitation Potential:  Good for goals listed above. Strengths for achieving goals include:  Med compliance  Barriers to achieving goals include: Decreased coping skills and Limited education    Plan: To be seen 2-5x/week while in acute care setting for therapeutic exercises/act, ADL retraining, NMR and patient/caregiver ed/instruction.        Signature and License Number  Jackson Acuña John 87, OTR/L  #[id number]           If patient discharges from this facility prior to next visit, this note will serve as the Discharge Summary

## 2020-11-03 NOTE — GROUP NOTE
Group Therapy Note    Date: 11/3/2020    Group Start Time: 1330  Group End Time: 0407  Group Topic: Music Therapy    [address] [facility]        Group Therapy Note    Group Topic: Blues Songwriting    Emotional Expression & Coping Identification    Attendees: 6           Notes:  Present with active engagement throughout. Status After Intervention:  Improved    Participation Level:  Active Listener    Participation Quality: Appropriate, Attentive and Sharing      Speech:  normal      Thought Process/Content: Logical      Affective Functioning: Congruent      Mood: euthymic      Level of consciousness:  Alert and Oriented x4      Response to Learning: Able to verbalize/acknowledge new learning, Capable of insight and Progressing to goal      Endings: None Reported    Modes of Intervention: Socialization, Exploration and Media      Discipline Responsible: Psychoeducational Specialist      Signature:  Tiesha Chang MM, MT-BC

## 2020-11-03 NOTE — PROGRESS NOTES
Senior Purposeful Rounding    Position:Left Side    Physical Environment:Bed alarm functioning and No safety hazards noted    Pain Rating/ Nonverbal Pain Behaviors:0; Sleeping    Pain interventions Attempted: N/A    Patient Toileted:Incontinent and Bowel Movement

## 2020-11-03 NOTE — PROGRESS NOTES
Senior Purposeful Rounding    Position:Left Side    Physical Environment:Bed alarm functioning and No safety hazards noted    Pain Rating/ Nonverbal Pain Behaviors:6; n/a    Pain interventions Attempted: prn tramadol given     Patient Toileted:Incontinent changed

## 2020-11-03 NOTE — PLAN OF CARE
Problem: Falls - Risk of:  Goal: Will remain free from falls  Description: Will remain free from falls  Outcome: Ongoing     Problem: Altered Mood, Deterioration in Function:  Goal: Able to verbalize reality based thinking  Description: Able to verbalize reality based thinking  Outcome: Ongoing    Pt has been pleasant and cooperative with staff. She denies SI/HI/AVH and no RTIS noted. She has been visible in the day room and social with staff. Pt given prn tramadol at 2036 for 6/10 back, bilateral knee and leg pain. Medication was effective along with rest and repositioning. Pt was assisted back to bed with the assistance of two staff members. She was incontinent of a small amount of stool. Pt is currently asleep in bed with fall precautions in place.  Electronically signed by Chalino Bunch RN on 11/2/2020 at 11:32 PM

## 2020-11-03 NOTE — PROGRESS NOTES
Senior Purposeful Rounding    Position:Sitting    Physical Environment:No safety hazards noted and Chair alarm in place and functioning    Pain Rating/ Nonverbal Pain Behaviors:3; Grimaces and cries out when moving legs    Pain interventions Attempted: Tramadol given @ 0336, cannot be given at this time.  Pt repositioned legs for comfort    Patient Toileted:Incontinent - Pt's brief changed

## 2020-11-03 NOTE — PROGRESS NOTES
Senior Purposeful Rounding    Position:Sitting    Physical Environment:Chair alarm in place and functioning    Pain Rating/ Nonverbal Pain Behaviors:6; n/a    Pain interventions Attempted: n/a    Patient Toileted:Incontinent

## 2020-11-04 LAB — SARS-COV-2, NAAT: NOT DETECTED

## 2020-11-04 PROCEDURE — 6370000000 HC RX 637 (ALT 250 FOR IP): Performed by: PSYCHIATRY & NEUROLOGY

## 2020-11-04 PROCEDURE — U0002 COVID-19 LAB TEST NON-CDC: HCPCS

## 2020-11-04 PROCEDURE — 97110 THERAPEUTIC EXERCISES: CPT

## 2020-11-04 PROCEDURE — 6370000000 HC RX 637 (ALT 250 FOR IP): Performed by: PHYSICIAN ASSISTANT

## 2020-11-04 PROCEDURE — 6370000000 HC RX 637 (ALT 250 FOR IP): Performed by: NURSE PRACTITIONER

## 2020-11-04 PROCEDURE — 1240000000 HC EMOTIONAL WELLNESS R&B

## 2020-11-04 PROCEDURE — 99233 SBSQ HOSP IP/OBS HIGH 50: CPT | Performed by: NURSE PRACTITIONER

## 2020-11-04 PROCEDURE — 97161 PT EVAL LOW COMPLEX 20 MIN: CPT

## 2020-11-04 RX ORDER — RISPERIDONE 0.25 MG/1
0.5 TABLET, FILM COATED ORAL 2 TIMES DAILY
Status: DISCONTINUED | OUTPATIENT
Start: 2020-11-04 | End: 2020-11-09 | Stop reason: HOSPADM

## 2020-11-04 RX ADMIN — ASPIRIN 81 MG: 81 TABLET, COATED ORAL at 08:48

## 2020-11-04 RX ADMIN — RISPERIDONE 0.5 MG: 0.25 TABLET ORAL at 21:31

## 2020-11-04 RX ADMIN — PANTOPRAZOLE SODIUM 40 MG: 40 TABLET, DELAYED RELEASE ORAL at 06:35

## 2020-11-04 RX ADMIN — LOSARTAN POTASSIUM 100 MG: 100 TABLET, FILM COATED ORAL at 08:48

## 2020-11-04 RX ADMIN — TRAMADOL HYDROCHLORIDE 50 MG: 50 TABLET, FILM COATED ORAL at 09:16

## 2020-11-04 RX ADMIN — POLYETHYLENE GLYCOL (3350) 17 G: 17 POWDER, FOR SOLUTION ORAL at 08:47

## 2020-11-04 RX ADMIN — LEVOTHYROXINE SODIUM 50 MCG: 50 TABLET ORAL at 06:35

## 2020-11-04 RX ADMIN — RISPERIDONE 0.25 MG: 0.25 TABLET ORAL at 08:49

## 2020-11-04 RX ADMIN — METOPROLOL SUCCINATE 100 MG: 50 TABLET, EXTENDED RELEASE ORAL at 08:48

## 2020-11-04 ASSESSMENT — PAIN DESCRIPTION - ONSET
ONSET: ON-GOING
ONSET: ON-GOING

## 2020-11-04 ASSESSMENT — PAIN DESCRIPTION - DESCRIPTORS
DESCRIPTORS: THROBBING
DESCRIPTORS: THROBBING

## 2020-11-04 ASSESSMENT — PAIN DESCRIPTION - ORIENTATION
ORIENTATION: UPPER;LOWER
ORIENTATION: UPPER;LOWER

## 2020-11-04 ASSESSMENT — PAIN SCALES - GENERAL
PAINLEVEL_OUTOF10: 9
PAINLEVEL_OUTOF10: 5
PAINLEVEL_OUTOF10: 8
PAINLEVEL_OUTOF10: 9

## 2020-11-04 ASSESSMENT — PAIN - FUNCTIONAL ASSESSMENT
PAIN_FUNCTIONAL_ASSESSMENT: PREVENTS OR INTERFERES WITH MANY ACTIVE NOT PASSIVE ACTIVITIES
PAIN_FUNCTIONAL_ASSESSMENT: PREVENTS OR INTERFERES WITH MANY ACTIVE NOT PASSIVE ACTIVITIES

## 2020-11-04 ASSESSMENT — PAIN DESCRIPTION - FREQUENCY
FREQUENCY: CONTINUOUS
FREQUENCY: CONTINUOUS

## 2020-11-04 ASSESSMENT — PAIN DESCRIPTION - PROGRESSION
CLINICAL_PROGRESSION: NOT CHANGED
CLINICAL_PROGRESSION: GRADUALLY IMPROVING

## 2020-11-04 ASSESSMENT — PAIN DESCRIPTION - PAIN TYPE
TYPE: CHRONIC PAIN
TYPE: CHRONIC PAIN

## 2020-11-04 ASSESSMENT — PAIN DESCRIPTION - LOCATION
LOCATION: BACK
LOCATION: BACK

## 2020-11-04 NOTE — BH NOTE
Pt A+Ox3, calm, cooperative, and medication compliant. Pt denies SI/HI/AVH and no RTIS noted. Pt spent the evening in the dayroom socializing with peers. Pt is currently sleeping in her room with bed alarm on and has no other needs at this time.

## 2020-11-04 NOTE — PROGRESS NOTES
water, traZODone, benztropine mesylate, magnesium hydroxide, aluminum & magnesium hydroxide-simethicone, LORazepam, traMADol, hydrALAZINE     Objective:     PE:    BP (!) 141/68   Pulse 81   Temp 97.5 °F (36.4 °C) (Oral)   Resp 18   Ht 5' 5\" (1.651 m)   Wt 113 lb (51.3 kg)   SpO2 97%   BMI 18.80 kg/m²       Motor / Gait: Unable to ambulate, requires lift for transfer    Mental Status Examination:   Appearance: WF, appears stated age, in chair, wearing hospital attire, fair grooming and hygiene   Behavior/Attitude Toward Examiner: Cooperative, pleasant, good eye contact  Speech: Spontaneous, normal rate, normal volume   Mood: \"Okay\"  Affect: Blunted  Thought Processes: Logical, less perseverative today  Thought Content: Denies SI/HI,+ paranoia or delusional content verbalized during assessment  Perceptions: Reports AH, did not appear to be actively RTIS during assessment  Attention: Intact to interview  Cognition: Average RAQUEL, alert and oriented to person, place, time, recall grossly intact  Insight: Impaired insight   Judgment: Impaired judgment      LAB: Reviewed labs from last 24 hours      Dx:   Primary Psychiatric (DSM V) Diagnosis: Psychosis, unspecified  Secondary Psychiatric (DSM V) Diagnoses: Depression, per history   Chemical Dependency Diagnoses: None  Active Medical Diagnoses: Hypothyroidism, elevated TSH, HTN, GERD, PAF, chronic diastolic CHF, chronic low back pain, mild CAD    All conditions detailed above are being treated while patient is hospitalized. Tx Plan: Generally: prevent self injury/aggression towards others, stabilize mood/anxiety/psychotic/behavioral disturbance, establish/maintain aftercare, increase coping mechanisms, improve medication compliance. All conditions present on admission are being treated while pt is hospitalized. Legal Status: Voluntary    Primary Psychiatric Issues:  1. Psychosis, unspecified  - Trial Risperdal for paranoia, delusional thinking.  R/B/SE reviewed with patient's son/POA, who gave verbal permission to start medication  - Increase Risperdal 0.5 mg BID    Chemical Dependency Issues:  - No issues    Function:  - Consulted physical therapy - appreciate recs  - Consulted occupational therapy - appreciate recs  - Falls precautions    Medical Problems:  Internal medicine has been consulted. Appreciate recs. Hypothyroidism  Elevated TSH  - TSH 66.8, free T3: 1.4, check free T4  - pt endorses medication compliance  - home dose of synthroid 50 mcg, previously on 25 mcg  - will cont Synthroid here, will need repeat TSH in 4-6 weeks OP     HTN  - initially elevated, has since trended down  - cont Cozaar, Toprol XL, Procardia  - monitor     GERD  - cont Protonix     PAF  - NSR on EKG  - no AC - reason unclear  - cont BB     Chronic Diastolic CHF  - appears compensated  - low Na diet, daily weights  - cont BB; no diuretics on home med list     Chronic Low Back Pain  - PRN Ultram     Mild CAD  - no c/o chest pain  - cont ASA, BB    Code Status: Full Code    Disposition:    - Housing: Lives with son  - Current outpatient follow-up: PCP  - Discharge planning is incomplete    Estimated Length of Stay: 5-7 days     Criteria for Discharge: Not suicidal, not homicidal, not grossly psychotic, behavioral disturbance improved, sleeping well, mood/affect stable, eating well, aftercare arranged. Total face to face time with patient was 30 minutes and more than 50% of that time was spent counseling the patient on their symptoms, treatment and expected goals.     Jane Tovar, MPH, PMHNP-BC  11/04/20

## 2020-11-04 NOTE — PROGRESS NOTES
Senior Purposeful Rounding    Position:Sitting    Physical Environment:No safety hazards noted and Chair alarm in place and functioning    Pain Rating/ Nonverbal Pain Behaviors:denies;  No pain behaviors noted    Pain interventions Attempted: N/A    Patient Toileted:No- Void

## 2020-11-04 NOTE — PLAN OF CARE
Problem: Falls - Risk of:  Goal: Will remain free from falls  Outcome: Ongoing  Fall risk, armband on, bed alarm on, light on outside of room, call light within reach, bed in lowest/locked position. Pt chairfast, transferred via [facility] lift.

## 2020-11-04 NOTE — GROUP NOTE
Group Therapy Note    Date: 11/4/2020    Group Start Time: 9465  Group End Time: 1400  Group Topic: [facility]        Group Therapy Note    Attendees: 7    Patient's Goal: to engage in [place] intervention to improve mood, increase socialization, and improve overall quality of life. Notes:  Odilon Briceno actively engaged in group while in attendance. Pt was observed smiling and actively singing along. Pt verbalized enjoyment at the conclusion. Status After Intervention:  Improved    Participation Level:  Active Listener and Interactive    Participation Quality: Appropriate and Attentive      Speech:  normal      Thought Process/Content: Linear      Affective Functioning: Constricted/Restricted      Mood: euthymic      Level of consciousness:  Alert and Attentive      Response to Learning: Able to verbalize current knowledge/experience and Progressing to goal      Endings: None Reported    Modes of Intervention: Support, Socialization, Activity and Media      Discipline Responsible: Psychoeducational Specialist      Signature:  NISHA Escobedo

## 2020-11-04 NOTE — PLAN OF CARE
Problem: Skin Integrity:  Goal: Absence of new skin breakdown  Description: Absence of new skin breakdown  Outcome: Met This Shift     Problem: Falls - Risk of:  Goal: Will remain free from falls  Description: Will remain free from falls  Outcome: Met This Shift     Problem: Falls - Risk of:  Goal: Absence of physical injury  Description: Absence of physical injury  Outcome: Met This Shift     Problem: Altered Mood, Deterioration in Function:  Goal: Maintenance of adequate nutrition will improve  Description: Maintenance of adequate nutrition will improve  Outcome: Met This Shift     Problem: Altered Mood, Psychotic Behavior:  Goal: Able to demonstrate trust by eating, participating in treatment and following staff's direction  Description: Able to demonstrate trust by eating, participating in treatment and following staff's direction  Outcome: Met This Shift     Problem: Pain:  Goal: Pain level will decrease  Description: Pain level will decrease  Outcome: Ongoing  Pt has remained free from falls and injury so far this shift. Med compliant. Pt c/o back pain, PRN Tramadol seemed to be effective, pt napped in recliner after administration. Intake adequate. Calm & cooperative. Needs repositioned Q2. Resting quietly in recliner at this time. Pt able to voice when wanting legs down, chairfast. Will continue to monitor.

## 2020-11-04 NOTE — PROGRESS NOTES
Senior Purposeful Rounding    Position:Left Side    Physical Environment:Room free from clutter, Clear path to bathroom , Adequate lighting, Bed alarm functioning and No safety hazards noted    Pain Rating/ Nonverbal Pain Behaviors:0; pt sleeping    Pain interventions Attempted: none    Patient Toileted:No- Void

## 2020-11-04 NOTE — PROGRESS NOTES
Senior Purposeful Rounding    Position:Sitting    Physical Environment:No safety hazards noted    Pain Rating/ Nonverbal Pain Behaviors:9; pt repositioned to recliner in dayroom     Pain interventions Attempted: PRN Tramadol administered.       Patient Toileted:Incontinent and No- Void

## 2020-11-04 NOTE — BH NOTE
Senior Purposeful Rounding     Position:Repositions Self     Physical Environment:No safety hazards noted     Pain Rating/ Nonverbal Pain Behaviors:0;      Pain interventions Attempted: n/a     Patient Toileted:No- Void

## 2020-11-04 NOTE — PROGRESS NOTES
Senior Purposeful Rounding    Position:Sitting and Back    Physical Environment:No safety hazards noted    Pain Rating/ Nonverbal Pain Behaviors:7; pt working with PT at this time    Pain interventions Attempted: repositioned.  Pt declined PRN pain meds and all other nonpharm interventions    Patient Toileted:Continent and No- Void

## 2020-11-04 NOTE — PROGRESS NOTES
Senior Purposeful Rounding    Position:Sitting    Physical Environment:No safety hazards noted and Chair alarm in place and functioning    Pain Rating/ Nonverbal Pain Behaviors:denies;  No pain behaviors noted    Pain interventions Attempted: N/A    Patient Toileted:Incontinent and Bowel Movement

## 2020-11-04 NOTE — BH NOTE
Pt c/o of restless legs, inability to sleep, and general discomfort. Writer offered Trazodone, which she refused. Writer then offered Tramadol and she refused this as well. Pt was repositioned. Will monitor.

## 2020-11-04 NOTE — PROGRESS NOTES
Senior Purposeful Rounding    Position:Sitting and Right Side    Physical Environment:No safety hazards noted and Chair alarm in place and functioning    Pain Rating/ Nonverbal Pain Behaviors:0; pt sleeping in recliner at this time.     Pain interventions Attempted: none    Patient Toileted:Incontinent

## 2020-11-04 NOTE — PROGRESS NOTES
Brief Progress Note    Date: 11/03/20    Subjective: Ms Cesar Litten is seen sitting in common area. Denies complaints     Discussed with nurse- no concerns to report     She had BLE dopplers today to evaluate lower extremity edema- showed an age indeterminate superficial venous thrombosis in the right greater saphenous vein     Objective:  Vitals:    11/01/20 2003 11/02/20 0806 11/02/20 2020 11/03/20 0826   BP: (!) 140/71 (!) 141/72 113/61 131/65   Pulse: 69 62 102 63   Resp: 18 18 16 18   Temp: 97.9 °F (36.6 °C) 97.1 °F (36.2 °C) 98.2 °F (36.8 °C) 97.9 °F (36.6 °C)   TempSrc: Oral Temporal Oral Temporal   SpO2: 97% 98% 96% 98%   Weight:       Height:           Physical Exam:    Gen: No distress. Alert. Eyes: PERRL. No sclera icterus. No conjunctival injection. ENT: No discharge. Pharynx clear. Neck: No JVD. Trachea midline. Resp: No accessory muscle use. No crackles. No wheezes. No rhonchi. CV: Regular rate. Regular rhythm. No murmur. No rub. 2+ RLE edema, 1+ LLE Edema. GI: Non-tender. Non-distended. Skin: Warm and dry. No nodule on exposed extremities. No rash on exposed extremities. Chronic skin changes and bruising to bilateral lower extremities   M/S: No cyanosis. No joint deformity. No clubbing. Neuro: Awake.  Grossly nonfocal    Psych: Per psychiatry team        Assessment & Plan:    #Dementia with Behavioral Distubances  - cont mgmt per psychiatry      #Hypothyroidism  #Elevated TSH  - TSH 66.8, free T3: 1.4, free T4 0.9  - pt endorses medication compliance  - home dose of synthroid 50 mcg, previously on 25 mcg (reviewed PCP notes, she had been increased from 25mcg to 50 mcg in the past, but her son decreased dose back down to 25 mcg daily)  - will cont Synthroid here at 50 mcg, will need repeat TSH in 4-6 weeks OP     #HTN  - initially elevated, has since trended down  - cont Cozaar, Toprol XL, Procardia  - monitor     #GERD  - cont Protonix     #PAF  - NSR on EKG  - no AC - per prior cardio notes this is due to history of falls   - cont BB     #Chronic Diastolic CHF  - she has lower extremity edema which she reports has been present >2 years and unchanged currently   - cont BB; no diuretics on home med list     #Chronic Low Back Pain  - PRN Ultram     #Mild CAD  - no c/o chest pain  - cont ASA, BB    #BLE edema  #Superficial venous thrombosis right greater saphenous vein  - she reports that the edema has been present for about 2 years and not significantly changed currently. She denies pain  - she denies prior eval for DVT- obtained dopplers of BLE which revealed an age indeterminate SVT in the right greater saphenous vein. Will discuss with my attending physician regarding possible anticoagulation     #Hyponatremia  - appears chronic on review of past labs. Could be related to hypothyroidism  - PCP f/u    Pnachito Ravi PA-C       Addendum:   Discussed RLE SVT with vascular tech, thrombosis seen appeared to be chronic and very small in size, not >5 cm.   Supportive measures only indicated, no indication for anticoagulation    Panchito Ravi PA-C  11/4/2020 9:52 AM

## 2020-11-04 NOTE — PROGRESS NOTES
Inpatient [facility] Physical Therapy Evaluation and Treatment    Unit:  [facility]  Date:  11/4/2020  Patient Name:    Brayan Fox  Admitting diagnosis:  Psychosis, unspecified psychosis type [facility]) Milind Hernandez  Admit Date:  10/30/2020  Precautions/Restrictions/Medical Indications    Standard BHI Precautions  Fall Risk  bed/chair alarm  confusion  B foot drop, BLE wounds     History of current Episode:   Per Dr. Jose Gilman dated 10/31/20:  \"HPI: 81 y/o wf without psych hx that was brought in accompanied by son for progression of her dementia and behavioral disturbances at home. Per son she has been more confused and has seen a declined for past 18 months. He stated that she had a fall at that time and she has not been able to walk since that time despite they were no injuries. Pt has been more paranoid against care taker and believes that he is bringing women inside the house that are stealing from her and that they are trying to poison her and this has affected her daily intake of food. Pt has been having a/v/h and believes that there is a lady name Zee dtr in law that is trying to kill her but she does not exist. Pt has not been sleeping well and she was not able to fully participate interview. Pt was recently started on zyprexa but stopped due to be ineffective. \"    Treatment Time:  1625--1645  Treatment Number:  1         Discharge Recommendations from PHYSICAL perspective:                                          home with 24 hr assist for all transfers and ADLs and home PT - if family unable to provide, may benefit from SNF    DME needs for discharge:     teena lift, BSC, shower chair     Therapy recommendations for staff:   Assist of 2 (total assist) with use of MAXI-Move for all transfers   to/from [facility]  to/from chair  Therapy recommendation for EMS Transport: requires transport by cot due to poor sitting tolerance     Home Health S4 Level: Level 3- Safety         AM-PAC Mobility Score     AM-PAC Inpatient Mobility Raw Score : 8        Preadmission Environment    Pt. Lives with family (son)  Home environment:    one story home  Steps to enter first floor: 1 steps to enter and no handrail  Steps to second floor: N/A  Bathroom: tub/shower unit and grab bars  Equipment owned: rollator and wc (manual)     Preadmission Status:  Pt. Able to drive: No  Pt Fully independent with ADLs: No-Assist to get into the shower-pt mostly bathes self. Son gets her pants over her feet, pt pulls them up while rolling in bed. Pt. Required assistance from family for: Bathing, Cleaning, Cooking, Dressing and Laundry   Pt. required assist: max-total A for transfers and was non-ambulatory x 6 months; son has been picking patient up for transfers however pt reports increased back pain and difficulty for caregiver  History of falls None recent. Had bad fall over a year ago and has been very fearful of walking since    Subjective:   Pt agreeable to evaluation and treatment as needed.    Reports chronic BLE pain  Reports wants to walk and stand \"more than anything in the world\"    Pain   Yes  Rating:moderate  Location: BLEs  Pain Medicine Status: No request made      Cognition    A&O to Person, Place and Situation  Able to follow:  1 step commands inconsistently    Upper Extremity ROM/Strength  Deferred to OT evaluation: please see OT note    Lower Extremity ROM / Strength (use of 5/5 scale if strength formally assessed)    ROM: able to achieve R ankle to neutral DF however limited by approximately 10 degrees of PROM DF on L ankle - limiting ability to attempt standing activities  Limited in B knee flexion by approximately 50%    Right LE  quads    3+                                       ant tibs  1 (trace contraction)                                                hams          3+                                               iliopsoas   3-                  LEFT LE   quads     3+     ant tibs       0     hams         3+    iliopsoas    3-  Sensation psychosis type ([facility] [address] [F29]. Presents today with weakness, limited ability to perform ADLs, decreased transfer ability  and poor balance. Would benefit from continued IP PT to address below impairments, improve pain and restore function. Recommending Home 24 hr assist and with home PT upon discharge as patient functioning would benefit from continued therapy services    Goals :   Patient Goals for Therapy: \" to walk again* \"      To be met in 3 visits:  1). Demonstrate improved safety awareness with functional mobility requiring less overall cueing. To be met in 6 visits:  1). Supine to/from sit  Assess at next visit as able to promote return to functional ADLs  2). Tolerate B ankle joint mobilization and PROM with improved ankle ROM to allow for sit<>stand attempts  3). Tolerate seated dynamic balance activities     Rehabilitation Potential    Fair  Strengths for achieving goals include:   Pt cooperative  Barriers to achieving goals include:    Severity of condition      Plan    To be seen 2-5x/week while in [facility] setting for   Therapeutic Exercise, Manual Therapy , Neuromuscular Re-Education, Gait training and Therapeutic Activity     Timed Code Treatment Minutes:  10 minutes  Total Treatment Time:   20 minutes    Apurva Vigil PT, DPT, OMT-C #[id number]        If patient discharges from this facility prior to next visit, this note will serve as the Discharge Summary.

## 2020-11-05 LAB
GLUCOSE BLD-MCNC: 116 MG/DL (ref 70–99)
PERFORMED ON: ABNORMAL

## 2020-11-05 PROCEDURE — 6370000000 HC RX 637 (ALT 250 FOR IP): Performed by: PHYSICIAN ASSISTANT

## 2020-11-05 PROCEDURE — 1240000000 HC EMOTIONAL WELLNESS R&B

## 2020-11-05 PROCEDURE — 6370000000 HC RX 637 (ALT 250 FOR IP): Performed by: NURSE PRACTITIONER

## 2020-11-05 PROCEDURE — 99233 SBSQ HOSP IP/OBS HIGH 50: CPT | Performed by: NURSE PRACTITIONER

## 2020-11-05 PROCEDURE — 6370000000 HC RX 637 (ALT 250 FOR IP): Performed by: PSYCHIATRY & NEUROLOGY

## 2020-11-05 PROCEDURE — 97535 SELF CARE MNGMENT TRAINING: CPT

## 2020-11-05 RX ADMIN — LOSARTAN POTASSIUM 100 MG: 100 TABLET, FILM COATED ORAL at 08:17

## 2020-11-05 RX ADMIN — POLYETHYLENE GLYCOL (3350) 17 G: 17 POWDER, FOR SOLUTION ORAL at 08:17

## 2020-11-05 RX ADMIN — METOPROLOL SUCCINATE 100 MG: 50 TABLET, EXTENDED RELEASE ORAL at 08:18

## 2020-11-05 RX ADMIN — ACETAMINOPHEN 650 MG: 325 TABLET ORAL at 09:39

## 2020-11-05 RX ADMIN — ASPIRIN 81 MG: 81 TABLET, COATED ORAL at 08:17

## 2020-11-05 RX ADMIN — LORAZEPAM 0.5 MG: 0.5 TABLET ORAL at 09:50

## 2020-11-05 RX ADMIN — LEVOTHYROXINE SODIUM 50 MCG: 50 TABLET ORAL at 05:06

## 2020-11-05 RX ADMIN — RISPERIDONE 0.5 MG: 0.25 TABLET ORAL at 20:37

## 2020-11-05 RX ADMIN — TRAMADOL HYDROCHLORIDE 50 MG: 50 TABLET, FILM COATED ORAL at 05:06

## 2020-11-05 RX ADMIN — RISPERIDONE 0.5 MG: 0.25 TABLET ORAL at 08:17

## 2020-11-05 RX ADMIN — PANTOPRAZOLE SODIUM 40 MG: 40 TABLET, DELAYED RELEASE ORAL at 05:06

## 2020-11-05 ASSESSMENT — PAIN SCALES - GENERAL
PAINLEVEL_OUTOF10: 8
PAINLEVEL_OUTOF10: 0
PAINLEVEL_OUTOF10: 0
PAINLEVEL_OUTOF10: 6
PAINLEVEL_OUTOF10: 6

## 2020-11-05 ASSESSMENT — PAIN DESCRIPTION - FREQUENCY
FREQUENCY: CONTINUOUS
FREQUENCY: CONTINUOUS

## 2020-11-05 ASSESSMENT — PAIN DESCRIPTION - ONSET
ONSET: ON-GOING
ONSET: ON-GOING

## 2020-11-05 ASSESSMENT — PAIN DESCRIPTION - DESCRIPTORS
DESCRIPTORS: THROBBING
DESCRIPTORS: THROBBING

## 2020-11-05 ASSESSMENT — PAIN DESCRIPTION - PROGRESSION
CLINICAL_PROGRESSION: NOT CHANGED
CLINICAL_PROGRESSION: GRADUALLY IMPROVING

## 2020-11-05 ASSESSMENT — PAIN DESCRIPTION - ORIENTATION
ORIENTATION: UPPER;LOWER
ORIENTATION: LOWER;UPPER

## 2020-11-05 ASSESSMENT — PAIN DESCRIPTION - LOCATION
LOCATION: BACK
LOCATION: BACK

## 2020-11-05 ASSESSMENT — PAIN - FUNCTIONAL ASSESSMENT
PAIN_FUNCTIONAL_ASSESSMENT: PREVENTS OR INTERFERES WITH MANY ACTIVE NOT PASSIVE ACTIVITIES
PAIN_FUNCTIONAL_ASSESSMENT: PREVENTS OR INTERFERES WITH ALL ACTIVE AND SOME PASSIVE ACTIVITIES

## 2020-11-05 ASSESSMENT — PAIN DESCRIPTION - PAIN TYPE
TYPE: CHRONIC PAIN
TYPE: CHRONIC PAIN

## 2020-11-05 NOTE — PROGRESS NOTES
Department of Psychiatry  Progress Note    Admission Date: 10/30/2020    Chief Complaint / Reason for Admission: Psychosis     Patient's chart was reviewed, case was discussed with nursing/OT/RT staff, and collaborated with  about the treatment plan. SUBJECTIVE:   Over last 24 hours:  Behavioral outbursts: No  Non-aggressive behavioral disturbance: Yes  Medication compliant: Yes  Need for seclusion/restraints: No  Sleeping adequately: Yes  Appetite adequate: Yes  Attending groups: Select but does not engage much in group activities    Cheri Caceres has been visible on the unit today, sleeping on and off. She appeared to be more confused during interactions today, expressing concern about her son. Told nursing staff that \"I'm nervous about my son. I heard the  ringing the doorbell to get him\" and \"They locked me in my room last night in the dark because I don't talk much. They said I couldn't have food or water all night because I don't talk. I can't hear well that's why I don't talk. \" She was more difficult to redirect in conversation today in comparison to previous days. Chart review from overnight indicate that she was anxious overnight and expressed somatic delusions thinking that she is dying. No verbal or behavioral outbursts noted.      Suicidal ideation: None verbalized  Homicidal ideation: None verbalized  Medication side effects: None    ROS: Patient has new complaints: No    Current Medications Ordered:   risperiDONE  0.5 mg Oral BID    levothyroxine  50 mcg Oral QAM AC    polyethylene glycol  17 g Oral Daily    aspirin  81 mg Oral Daily    losartan  100 mg Oral Daily    metoprolol succinate  100 mg Oral Daily    NIFEdipine  30 mg Oral Nightly    pantoprazole  40 mg Oral QAM AC      PRN Meds: acetaminophen, hydrOXYzine, OLANZapine **OR** OLANZapine, sterile water, traZODone, benztropine mesylate, magnesium hydroxide, aluminum & magnesium hydroxide-simethicone, LORazepam, traMADol, hydrALAZINE     Objective:     PE:    BP (!) 142/77   Pulse 68   Temp 97.9 °F (36.6 °C) (Oral)   Resp 18   Ht 5' 5\" (1.651 m)   Wt 113 lb (51.3 kg)   SpO2 97%   BMI 18.80 kg/m²       Motor / Gait: Unable to ambulate, requires lift for transfer    Mental Status Examination:   Appearance: WF, appears stated age, in chair, wearing hospital attire, fair grooming and hygiene   Behavior/Attitude Toward Examiner: Cooperative, pleasant, good eye contact  Speech: Spontaneous, normal rate, normal volume   Mood: \"I'm okay\", anxious at times  Affect: Blunted  Thought Processes: Perseverative   Thought Content: Denies SI/HI,+ paranoia and delusional content verbalized during assessment, + confabulation today  Perceptions: Reports AH, did not appear to be actively RTIS during assessment  Attention: Impaired  Cognition: Average RAQUEL, alert and oriented to person, place  Insight: Impaired insight   Judgment: Impaired judgment      LAB: Reviewed labs from last 24 hours      Dx:   Primary Psychiatric (DSM V) Diagnosis: Psychosis, unspecified  Secondary Psychiatric (DSM V) Diagnoses: Depression, per history   Chemical Dependency Diagnoses: None  Active Medical Diagnoses: Hypothyroidism, elevated TSH, HTN, GERD, PAF, chronic diastolic CHF, chronic low back pain, mild CAD    All conditions detailed above are being treated while patient is hospitalized. Tx Plan: Generally: prevent self injury/aggression towards others, stabilize mood/anxiety/psychotic/behavioral disturbance, establish/maintain aftercare, increase coping mechanisms, improve medication compliance. All conditions present on admission are being treated while pt is hospitalized. Legal Status: Voluntary    Primary Psychiatric Issues:  1. Psychosis, unspecified  - Trial Risperdal for paranoia, delusional thinking.  R/B/SE reviewed with patient's son/POA, who gave verbal permission to start medication  - Increase Risperdal 0.5 mg BID    Chemical Dependency Issues:  - No issues    Function:  - Consulted physical therapy - appreciate recs  - Consulted occupational therapy - appreciate recs  - Falls precautions    Medical Problems:  Internal medicine has been consulted. Appreciate recs. Hypothyroidism  Elevated TSH  - TSH 66.8, free T3: 1.4, check free T4  - pt endorses medication compliance  - home dose of synthroid 50 mcg, previously on 25 mcg  - will cont Synthroid here, will need repeat TSH in 4-6 weeks OP     HTN  - initially elevated, has since trended down  - cont Cozaar, Toprol XL, Procardia  - monitor     GERD  - cont Protonix     PAF  - NSR on EKG  - no AC - reason unclear  - cont BB     Chronic Diastolic CHF  - appears compensated  - low Na diet, daily weights  - cont BB; no diuretics on home med list     Chronic Low Back Pain  - PRN Ultram     Mild CAD  - no c/o chest pain  - cont ASA, BB    Code Status: Full Code    Disposition:    - Housing: Lives with son  - Current outpatient follow-up: PCP  - Discharge planning is incomplete    Estimated Length of Stay: 5-7 days     Criteria for Discharge: Not suicidal, not homicidal, not grossly psychotic, behavioral disturbance improved, sleeping well, mood/affect stable, eating well, aftercare arranged. Total face to face time with patient was 30 minutes and more than 50% of that time was spent counseling the patient on their symptoms, treatment and expected goals.     Juice Pratt, MPH, PMHNP-BC  11/05/20

## 2020-11-05 NOTE — BH NOTE
Senior Purposeful Rounding    Position:Back    Physical Environment:Room free from clutter, Clear path to bathroom , Adequate lighting, Bed alarm functioning and No safety hazards noted    Pain Rating/ Nonverbal Pain Behaviors:0; asleep    Pain interventions Attempted: N/A    Patient Toileted:Incontinent

## 2020-11-05 NOTE — PLAN OF CARE
Problem: Skin Integrity:  Goal: Absence of new skin breakdown  Description: Absence of new skin breakdown  Outcome: Met This Shift     Problem: Falls - Risk of:  Goal: Will remain free from falls  Description: Will remain free from falls  Outcome: Met This Shift     Problem: Falls - Risk of:  Goal: Absence of physical injury  Description: Absence of physical injury  Outcome: Met This Shift     Problem: Altered Mood, Deterioration in Function:  Goal: Maintenance of adequate nutrition will improve  Description: Maintenance of adequate nutrition will improve  Outcome: Met This Shift     Problem: Altered Mood, Deterioration in Function:  Goal: Ability to tolerate increased activity will improve  Description: Ability to tolerate increased activity will improve  Outcome: Ongoing     Problem: Altered Mood, Psychotic Behavior:  Goal: Compliance with prescribed medication regimen will improve  Description: Compliance with prescribed medication regimen will improve  Outcome: Met This Shift  Pt has remained free from falls and injury so far this shift. A&O to self and place [facility]). Med compliant, whole. Pt stated this morning, \"They locked me in my room last night in the dark because I don't talk much. They said I couldn't have food or water all night because I don't talk. I can't hear well that's why I don't talk. \" Then went on to say that she heard the doorbell and the  were here to get her son. Writer offered reassurance but was not able to redirect. PRN ativan administered at 0950, which was effective. Pt c/o back pain, PRN Tylenol administered at 0939, repositioned, and hot pack applied which was effective. Food and fluid intake is adequate, pt feeds self after set up. Napping in recliner in dayroom at this time. Pt able to tell staff when incontinent. Chair alarm on. Will continue to monitor.

## 2020-11-05 NOTE — PROGRESS NOTES
Inpatient Occupational Therapy  Treatment    Unit:   [facility]  Date:  11/5/2020  Patient Name:    Brayden Figueroa  Admitting diagnosis:  Psychosis, unspecified psychosis type [facility]) Silvia Meyer Date:  10/30/2020  Precautions/Restrictions/WB Status/ Lines/ Wounds/ Oxygen:  Standard BHI Precautions  Fall Risk  [place] (hard of hearing)     History of Present Illness: (per H&P) 79 y/o wf without psych hx that was brought in accompanied by son for progression of her dementia and behavioral disturbances at home. Per son she has been more confused and has seen a declined for past 18 months. He stated that she had a fall at that time and she has not been able to walk since that time despite they were no injuries. Pt has been more paranoid against care taker and believes that he is bringing women inside the house that are stealing from her and that they are trying to poison her and this has affected her daily intake of food. Pt has been having a/v/h and believes that there is a lady name Zee dtr in law that is trying to kill her but she does not exist. Pt has not been sleeping well and she was not able to fully participate interview. Pt was recently started on zyprexa but stopped due to be ineffective.  (per Arelis SEGUNDO's 11/2/20 note) Spoke to patient's son, Ze Gauthier, who is also reportedly her POA. He reports that the patient has a nurse come in once/week and this nurse told him that she needed to come to the hospital. For the past 3 months, the patient has been experiencing paranoia and delusional thoughts. He reports that she believes she is being poisoned and that there is someone in the home trying to kill her. As a result, she has not been eating food or taking her medications. He notes that this has been worsening over the past few weeks. In addition, she has been crying, believing someone killed either her son or brother.  She also believes Marcello Erazo lives behind them and that him and the girl next door are conspiring to get her a haircut. He reports no known past psychiatric history outside of some depression after her daughter passed away but this was managed with an antidepressant. He reports no noticeable memory changes or concerns of dementia. Her previous medication of Zyprexa was prescribed by PCP but didn't seem to be doing any good so it was discontinued approximately one week ago. No known previous medication trials outside of Zyprexa; no known previous inpatient psychiatric admissions. We discussed the R/B/SE of antipsychotic medications and he states it is okay to use, \"at this point in time, it's a must\".     Treatment Number:  2    Treatment Time: 0262-1538  Timed Code Treatment Minutes: 16    minutes   Total Treatment Time:    16  minutes    Staff Recommendations: Total assist with transfers      Patient Goals for Therapy:  \" to get better \"    Discharge Recommendations:  Home with assist     DME needs for discharge:      Needs Met     AM-PAC Score: AM-PAC Inpatient Daily Activity Raw Score: 10 10  Home Health S4 Level: NA    MOCA:  To be assessed  Pain   Yes  Ratin  Location: B legs  Pain Medicine Status: No request made      Cognition    A&O to x4 and orientation not directly assessed. patient asked orientation questions however perseverated on not wanting to do any legs exercises, however therapist did not ask patient to do any leg exercises.      Balance : Patient declined attempt to sit up in chair to work on sitting balance due to pain in legs  Static Sitting:  Not tested  Dynamic Sitting: Not tested  Static Standing:  Not tested-unable to stand  Dynamic Standing: Not tested    Bed mobility:  Up in chair  Supine to sit:   Not Tested  Sit to supine:   Not Tested  Scooting to head of bed: Not Tested   Scooting in sitting:  Not Tested  Rolling:   Not Tested  Bridging:   Not Tested    Transfers:  Staff used lift to get up to chair, and declined to attempt any transfers or sit to stand  Sit to stand:   Not Tested  Stand to sit:   Not Tested  Bed/Chair to/from toilet: Not Tested    Self Care: Toileting: Not Tested  Grooming: Not Tested, attempted to have patient clean teeth, however declined with max encouragement,   Dressing: Not Tested   Feeding: Able to feed self breakfast and drink from a cup    Exercise / Activities Initiated:   Attempted AAROM bilateral UEs, however patient stated she did not want to do any exercises at this time. Assessment :  Initially patient agreeable to go to bathroom at sink using her chair to brush her teeth. After in bathroom patient became agitated and upset stating her legs were hurting and she was unable to move her legs or do any exercises. Patient reassured that therapist did not want to do any exercises and encouraged her to brush her teeth. Patient declined any further grooming tasks. Patient returned to [facility] area and was able to use her bilateral UEs to take a drink and feed herself. Will continue to encourage patient to perform ADL and transfer tasks as agreeable to improve functional independence. Goal(s) : To be met in 3 Visits:  1). Pt will verbalize 3 coping skills  2). Pt will participate in [facility] [place] [facility] assessment. To be met in 5 Visits:  1). Supine to Sit mod A  2). Bed to Chair/BSC with Max A of 1  3). Upper Body Bathing with setup  4). Pt to catrachita UE exs [id number]  5). Pt. To complete interest check list.   4). Pt. To verbalize understanding of sleep hygiene education/handouts. Plan: To be seen 2-5x/week while in acute care setting for therapeutic exercises/act, ADL retraining, NMR and patient/caregiver ed/instruction.        Signature and License Number  [address] [place] [address], OTR/L [id number]            If patient discharges from this facility prior to next visit, this note will serve as the Discharge Summary

## 2020-11-05 NOTE — BH NOTE
Senior Purposeful Rounding    Position:Right Side    Physical Environment:Room free from clutter, Clear path to bathroom , Adequate lighting, Bed alarm functioning and No safety hazards noted    Pain Rating/ Nonverbal Pain Behaviors:0; sleeping    Pain interventions Attempted: PRN Tramadol    Patient Toileted:No- Void

## 2020-11-05 NOTE — BH NOTE
Pt has s/s anxiety. Stated \"I'm nervous about my son. I heard the  ringing the doorbell to get him\". Rambling confabulations and calling writer to side multiple times. Pt was not able to be reoriented at this time. Writer offered reassurance. Will continue to monitor.

## 2020-11-05 NOTE — BH NOTE
Senior Purposeful Rounding    Position:Right Side    Physical Environment:Room free from clutter, Clear path to bathroom , Adequate lighting, Bed alarm functioning and No safety hazards noted    Pain Rating/ Nonverbal Pain Behaviors:0; asleep    Pain interventions Attempted: N/A    Patient Toileted:No- Void

## 2020-11-05 NOTE — PROGRESS NOTES
Senior Purposeful Rounding    Position:Sitting    Physical Environment:No safety hazards noted    Pain Rating/ Nonverbal Pain Behaviors:denies; pt sitting in dayroom at this time    Pain interventions Attempted: none    Patient Toileted:Incontinent

## 2020-11-05 NOTE — PROGRESS NOTES
Senior Purposeful Rounding    Position:Sitting    Physical Environment:No safety hazards noted and Chair alarm in place and functioning    Pain Rating/ Nonverbal Pain Behaviors:denies; pt eating supper at this time.     Pain interventions Attempted: none    Patient Toileted:Incontinent and No- Void

## 2020-11-05 NOTE — BH NOTE
PAYAL called the pt's son to make him aware that the pt will likely be discharging tomorrow. PAYAL also explained to pt's son that his mother was potentially exposed to COVID-19 on the unit; however has been re-tested and was negative. PAYAL made [facility] aware that the pt;s will need to quarantine up to 14 days following exposure. Pt's son stated stated that is OK. Payal will work on getting pt's [facility] services re-instated for discharge; [facility] would like to pick the pt up at 12:00 tomorrow. Payal will call if anything changes with the discharge plan.            AYLA Aguila

## 2020-11-05 NOTE — FLOWSHEET NOTE
Group Therapy Note    Date: 11/5/2020  Start Time: 1000  End Time:  7245  Number of Participants: 4    Type of Group: Music Group    Notes:  Pt present for Music Group. Pt sat quietly during group.     Participation Level: Minimal    Participation Quality: Attentive      Speech:  slurred      Affective Functioning: Flat      Endings: None Reported    Modes of Intervention: Support, Socialization, Activity and Media      Discipline Responsible: Chaplain Genesis Feng       11/05/20 [id number]   Encounter Summary   Services provided to: Patient   Continue Visiting   (11/5 Music Group)   Complexity of Encounter Moderate   Length of Encounter 45 minutes
Cultural and Spiritual   Spiritual concerns No   Cultural concerns No     Therapist completed psycho social assessment, leisure assessment, and C-SSRS with Pt. Pt reports no SI. Pt lives with her son Yaquelin Sage.

## 2020-11-05 NOTE — BH NOTE
Senior Purposeful Rounding    Position:Back    Physical Environment:Room free from clutter, Clear path to bathroom , Adequate lighting, Bed alarm functioning and No safety hazards noted    Pain Rating/ Nonverbal Pain Behaviors:0; no signs or symptoms    Pain interventions Attempted: N/A    Patient Toileted:Incontinent

## 2020-11-05 NOTE — PLAN OF CARE
Cheri Caceres has been in bed all evening. She is alert and oriented to person and place. Her mood is anxious and worried with somatic delusions of thinking she is dying. VSS. Procardia XL held for diastolic BP of 58. She is very [place] which makes interviewing her difficult. She has been repositioned q 2 hrs. Bed alarm on. Will continue to monitor.

## 2020-11-05 NOTE — GROUP NOTE
Group Therapy Note    Date: 11/5/2020    Group Start Time: 1100  Group End Time: 0737  Group Topic: Psychoeducation    [facility]        Group Therapy Note    Attendees: 4    Patient's Goal: to engage in identifying body sensations that occur while feeling specific emotions and discuss how to express emotions appropriately to increase emotional identification and expression. Notes:  Kamila Nair sat quietly during group. Pt did not engage in group discussion.      Status After Intervention:  Unchanged    Participation Level: Minimal    Participation Quality: Appropriate      Speech:  hesitant      Thought Process/Content: SNOW      Affective Functioning: Blunted      Mood: depressed      Level of consciousness:  Alert      Response to Learning: Progressing to goal      Endings: None Reported    Modes of Intervention: Education, Support, Socialization, Problem-solving and Activity      Discipline Responsible: Psychoeducational Specialist      Signature:  NISHA Conway

## 2020-11-05 NOTE — BH NOTE
Senior Purposeful Rounding    Position:Right Side    Physical Environment:Room free from clutter, Clear path to bathroom , Adequate lighting and Bed alarm functioning    Pain Rating/ Nonverbal Pain Behaviors:0; no signs or symptoms    Pain interventions Attempted: N/A    Patient Toileted:No- Void

## 2020-11-05 NOTE — BH NOTE
ELIZABETH called the pt's son, Jennifer Osman, and made him aware that the pt will be discharged Monday due to increased paranoia and delusions.              AylaPOOJAS

## 2020-11-05 NOTE — BH NOTE
Senior Purposeful Rounding    Position:Left Side    Physical Environment:Room free from clutter, Clear path to bathroom , Adequate lighting, Bed alarm functioning and No safety hazards noted    Pain Rating/ Nonverbal Pain Behaviors:0; no signs or symptoms    Pain interventions Attempted: N/A    Patient Toileted:  Bowel movement, soft, brown

## 2020-11-05 NOTE — PROGRESS NOTES
Senior Purposeful Rounding    Position:Sitting and Left Side    Physical Environment:No safety hazards noted and Chair alarm in place and functioning    Pain Rating/ Nonverbal Pain Behaviors:0; Pt sleeping in recliner in dayroom at this time.     Pain interventions Attempted: none    Patient Toileted:Incontinent and No- Void

## 2020-11-05 NOTE — PROGRESS NOTES
Senior Purposeful Rounding    Position:Sitting and Left Side    Physical Environment:No safety hazards noted and Chair alarm in place and functioning    Pain Rating/ Nonverbal Pain Behaviors:8; pt sitting in dayroom at this time    Pain interventions Attempted: heat pack    Patient Toileted:No- Void

## 2020-11-06 PROCEDURE — 1240000000 HC EMOTIONAL WELLNESS R&B

## 2020-11-06 PROCEDURE — 99233 SBSQ HOSP IP/OBS HIGH 50: CPT | Performed by: NURSE PRACTITIONER

## 2020-11-06 PROCEDURE — 6370000000 HC RX 637 (ALT 250 FOR IP): Performed by: PSYCHIATRY & NEUROLOGY

## 2020-11-06 PROCEDURE — 6370000000 HC RX 637 (ALT 250 FOR IP): Performed by: NURSE PRACTITIONER

## 2020-11-06 PROCEDURE — 6370000000 HC RX 637 (ALT 250 FOR IP): Performed by: PHYSICIAN ASSISTANT

## 2020-11-06 PROCEDURE — 97530 THERAPEUTIC ACTIVITIES: CPT

## 2020-11-06 PROCEDURE — 97535 SELF CARE MNGMENT TRAINING: CPT

## 2020-11-06 RX ORDER — DIMETHICONE, OXYBENZONE, AND PADIMATE O 2; 2.5; 6.6 G/100G; G/100G; G/100G
STICK TOPICAL PRN
Status: DISCONTINUED | OUTPATIENT
Start: 2020-11-06 | End: 2020-11-09 | Stop reason: HOSPADM

## 2020-11-06 RX ADMIN — LEVOTHYROXINE SODIUM 50 MCG: 50 TABLET ORAL at 09:17

## 2020-11-06 RX ADMIN — POLYETHYLENE GLYCOL (3350) 17 G: 17 POWDER, FOR SOLUTION ORAL at 09:17

## 2020-11-06 RX ADMIN — RISPERIDONE 0.5 MG: 0.25 TABLET ORAL at 20:24

## 2020-11-06 RX ADMIN — LOSARTAN POTASSIUM 100 MG: 100 TABLET, FILM COATED ORAL at 09:17

## 2020-11-06 RX ADMIN — RISPERIDONE 0.5 MG: 0.25 TABLET ORAL at 09:17

## 2020-11-06 RX ADMIN — ASPIRIN 81 MG: 81 TABLET, COATED ORAL at 09:17

## 2020-11-06 RX ADMIN — PANTOPRAZOLE SODIUM 40 MG: 40 TABLET, DELAYED RELEASE ORAL at 09:17

## 2020-11-06 RX ADMIN — Medication: at 12:41

## 2020-11-06 RX ADMIN — METOPROLOL SUCCINATE 100 MG: 50 TABLET, EXTENDED RELEASE ORAL at 09:38

## 2020-11-06 ASSESSMENT — PAIN SCALES - GENERAL: PAINLEVEL_OUTOF10: 6

## 2020-11-06 NOTE — BH NOTE
Pt requested all for side rails be up when she was going to sleep. She stated, \"I feel like I'm going to fall out of bed if they aren't all up\".

## 2020-11-06 NOTE — PROGRESS NOTES
Senior Purposeful Rounding    Position:Right Side    Physical Environment:Bed alarm functioning and No safety hazards noted    Pain Rating/ Nonverbal Pain Behaviors:denies;  No pain behaviors noted    Pain interventions Attempted: N/A    Patient Toileted:Incontinent and Bowel Movement

## 2020-11-06 NOTE — PROGRESS NOTES
Tested    Activity Tolerance   Pt completed therapy session with No adverse symptoms noted w/activity    ADL Training:   Upper body dressing:  Not Tested  Upper body bathing:  Not Tested  Lower body dressing:  Max A  Lower body bathing:  Max A  Toileting: Total A   Grooming/Hygiene:  Not Tested    Therapeutic Exercise:   N/A    Patient Education:   Role of OT  Recommendations for DC    Positioning Needs: In bed, call light and needs in reach. Alarm Set    Family Present:  No    Assessment: Pt able to tolerate bed level bathing this date. Pt limited by fatigue. Pt may benefit from continued skilled occupational therapy while in the hospital in order to progress to a safe and more indpt level of functioning. GOALS  To be met in 3 Visits:  1). Pt will verbalize 3 coping skills  2). Pt will participate in [facility] assessment.     To be met in 5 Visits:  1). Supine to Sit mod A  2). Bed to Chair/BSC with Max A of 1  3). Upper Body Bathing with setup  4). Pt to catrachita UE exs [id number]  5). Pt. To complete interest check list.   4). Pt. To verbalize understanding of sleep hygiene education/handouts.       Plan: cont with POC      SERGIO Correa, OTR/L   [id number]       If patient discharges from this facility prior to next visit, this note will serve as the Discharge Summary

## 2020-11-06 NOTE — BH NOTE
Senior Purposeful Rounding    Position:Right Side and Repositions Self    Physical Environment:Room free from clutter, Clear path to bathroom , Adequate lighting and Bed alarm functioning, no hazards noted    Pain Rating/ Nonverbal Pain Behaviors:0; no signs or symptoms of pain    Pain interventions Attempted: n/a    Patient Toileted:No- Void

## 2020-11-06 NOTE — PLAN OF CARE
Problem: Skin Integrity:  Goal: Will show no infection signs and symptoms  Description: Will show no infection signs and symptoms  Outcome: Ongoing  Goal: Absence of new skin breakdown  Description: Absence of new skin breakdown  Outcome: Ongoing     Problem: Altered Mood, Psychotic Behavior:  Goal: Able to demonstrate trust by eating, participating in treatment and following staff's direction  Description: Able to demonstrate trust by eating, participating in treatment and following staff's direction  Outcome: Ongoing  Goal: Able to verbalize decrease in frequency and intensity of hallucinations  Description: Able to verbalize decrease in frequency and intensity of hallucinations  Outcome: Ongoing    Pt is alert and oriented to person, place, and time. Pleasant and cooperative. Med compliant w/ medications whole. Incontinent of large amount of urine and lg, loose BM x2. Mepilex on coccyx changed. Up to delores chair in the morning w/ the teena lift. Pt tolerated well. Denies SI/HI/AVH, no RTIS or delusions noted. Denies any needs at this time.

## 2020-11-06 NOTE — PROGRESS NOTES
Senior Purposeful Rounding    Position:Right Side    Physical Environment:Bed alarm functioning and No safety hazards noted    Pain Rating/ Nonverbal Pain Behaviors:denies;  Pt sleeping    Pain interventions Attempted: N/A    Patient Toileted:Incontinent and Bowel Movement

## 2020-11-06 NOTE — PROGRESS NOTES
Patient yelling out, complained of bilateral LE pain and requested \"something for pain\". Attempted to medicate with tramadol and ativan d/t yelling out and patient refused. Door shut to decrease noise, will continue to monitor.

## 2020-11-06 NOTE — BH NOTE
Senior Purposeful Rounding    Position:Right Side and Repositions Self    Physical Environment:Room free from clutter, Clear path to bathroom , Adequate lighting, Bed alarm functioning and No safety hazards noted    Pain Rating/ Nonverbal Pain Behaviors:denies;     Pain interventions Attempted: n/a    Patient Toileted:No- Void

## 2020-11-06 NOTE — PROGRESS NOTES
Department of Psychiatry  Progress Note    Admission Date: 10/30/2020    Chief Complaint / Reason for Admission: Psychosis     Patient's chart was reviewed, case was discussed with nursing/OT/RT staff, and collaborated with  about the treatment plan. SUBJECTIVE:  Over last 24 hours:  Behavioral outbursts: No  Non-aggressive behavioral disturbance: Yes  Medication compliant: Yes  Need for seclusion/restraints: No  Sleeping adequately: Yes  Appetite adequate: Yes  Attending groups: Select but does not engage much in group activities    Karli Ruiz has been visible on the unit today, dozing in a chair in the dayroom. When I met with her, she reports that she feels tired today, although she does note that she was able to sleep last night (6.75 hours documented). She did not verbalize any overt delusional thought content or paranoia when talking to me but responses were brief as she appeared to doze off a few times during my interactions with her but would awaken briefly to voice. She denied needs or concerns at this time. No verbal or behavioral outbursts, no aggression. AM vital signs reviewed: temp - 97.7, BP - 152/76, pulse - 67, respirations - 16    It is documented overnight that she was alert and oriented x2, pleasant, and cooperative. Denied SI/HI, AVH, and did not appear to be RTIS, however, later on in the evening she was observed to be talking to herself and yelling out at times.      Suicidal ideation: None verbalized  Homicidal ideation: None verbalized  Medication side effects: None    ROS: Patient has new complaints: No    Current Medications Ordered:   risperiDONE  0.5 mg Oral BID    levothyroxine  50 mcg Oral QAM AC    polyethylene glycol  17 g Oral Daily    aspirin  81 mg Oral Daily    losartan  100 mg Oral Daily    metoprolol succinate  100 mg Oral Daily    NIFEdipine  30 mg Oral Nightly    pantoprazole  40 mg Oral QAM AC      PRN Meds: acetaminophen, hydrOXYzine, OLANZapine **OR** OLANZapine, sterile water, traZODone, benztropine mesylate, magnesium hydroxide, aluminum & magnesium hydroxide-simethicone, LORazepam, traMADol, hydrALAZINE     Objective:     PE:    /64   Pulse 72   Temp 98.8 °F (37.1 °C) (Oral)   Resp 16   Ht 5' 5\" (1.651 m)   Wt 113 lb (51.3 kg)   SpO2 93%   BMI 18.80 kg/m²       Motor / Gait: Unable to ambulate, requires lift for transfer    Mental Status Examination:   Appearance: WF, appears stated age, in chair, wearing hospital attire, fair grooming and hygiene   Behavior/Attitude Toward Examiner: Overall cooperative but difficult to engage due to her dozing off mid-assessment   Speech: Spontaneous, normal rate, normal volume   Mood: \"Okay\"  Affect: Blunted  Thought Processes: Difficult to fully assess due to her dozing off mid-assessment   Thought Content: Difficult to fully assess due to her dozing off mid-assessment   Perceptions: Difficult to fully assess due to her dozing off mid-assessment   Attention: Impaired  Cognition: Average RAQUEL, alert and oriented to person, place  Insight: Impaired insight   Judgment: Impaired judgment      LAB: Reviewed labs from last 24 hours      Dx:   Primary Psychiatric (DSM V) Diagnosis: Psychosis, unspecified  Secondary Psychiatric (DSM V) Diagnoses: Depression, per history   Chemical Dependency Diagnoses: None  Active Medical Diagnoses: Hypothyroidism, elevated TSH, HTN, GERD, PAF, chronic diastolic CHF, chronic low back pain, mild CAD    All conditions detailed above are being treated while patient is hospitalized. Tx Plan: Generally: prevent self injury/aggression towards others, stabilize mood/anxiety/psychotic/behavioral disturbance, establish/maintain aftercare, increase coping mechanisms, improve medication compliance. All conditions present on admission are being treated while pt is hospitalized. Legal Status: Voluntary    Primary Psychiatric Issues:   1.   Psychosis, unspecified  - Trial Risperdal for paranoia, delusional thinking. R/B/SE reviewed with patient's son/POA, who gave verbal permission to start medication  - Increase Risperdal 0.5 mg BID  - Follow up COVID test scheduled for 11/07/2020    Chemical Dependency Issues:  - No issues    Function:  - Consulted physical therapy - appreciate recs  - Consulted occupational therapy - appreciate recs  - Falls precautions    Medical Problems:  Internal medicine has been consulted. Appreciate recs. Hypothyroidism  Elevated TSH  - TSH 66.8, free T3: 1.4, check free T4  - pt endorses medication compliance  - home dose of synthroid 50 mcg, previously on 25 mcg  - will cont Synthroid here, will need repeat TSH in 4-6 weeks OP     HTN  - initially elevated, has since trended down  - cont Cozaar, Toprol XL, Procardia  - monitor     GERD  - cont Protonix     PAF  - NSR on EKG  - no AC - reason unclear  - cont BB     Chronic Diastolic CHF  - appears compensated  - low Na diet, daily weights  - cont BB; no diuretics on home med list     Chronic Low Back Pain  - PRN Ultram     Mild CAD  - no c/o chest pain  - cont ASA, BB    Code Status: Full Code    Disposition:    - Housing: Lives with son  - Current outpatient follow-up: PCP  - Discharge planning is incomplete    Estimated Length of Stay: 5-7 days     Criteria for Discharge: Not suicidal, not homicidal, not grossly psychotic, behavioral disturbance improved, sleeping well, mood/affect stable, eating well, aftercare arranged. Total face to face time with patient was 30 minutes and more than 50% of that time was spent counseling the patient on their symptoms, treatment and expected goals.     Cyndee Romano, MPH, PMHNP-BC  11/06/20

## 2020-11-06 NOTE — PROGRESS NOTES
Pt given a complete bed bath, shampoo, lotion, and gown and brief change. Pt tolerated well. No new skin issues.

## 2020-11-06 NOTE — BH NOTE
Pt is A+Ox2, calm, pleasant, cooperative and medication compliant. Pt denies SI/HI/AVH and no RTIS observed. Pt denies any other needs at this time.

## 2020-11-06 NOTE — BH NOTE
Senior Purposeful Rounding    Position:Repositions Self    Physical Environment:No safety hazards noted    Pain Rating/ Nonverbal Pain Behaviors:0;     Pain interventions Attempted: n/a    Patient Toileted:Incontinent

## 2020-11-06 NOTE — BH NOTE
RTIS tonight noted. Pt is talking to herself and yelling out at times. When writer checked on her, she could not verbalize if anything was wrong. Will monitor. Implemented All Universal Safety Interventions:  [place] to call system. Call bell, personal items and telephone within reach. Instruct patient to call for assistance. Room bathroom lighting operational. Non-slip footwear when patient is off stretcher. Physically safe environment: no spills, clutter or unnecessary equipment. Stretcher in lowest position, wheels locked, appropriate side rails in place.

## 2020-11-06 NOTE — BH NOTE
Patient has been intermittently yelling out in her sleep tonight. Patient does quiet down after a brief period of time, when staff awaken patient she denies any needs and denies yelling out. Will continue to monitor.

## 2020-11-06 NOTE — BH NOTE
Pt was invited and encouraged to attend [employer]. Lilyeusebio Turner declined invite, stating \"no, I'm too tired. \" Pt did not attend group.     Chon Sage, CTRS

## 2020-11-06 NOTE — BH NOTE
Senior Purposeful Rounding    Position:Right Side, assisted with turning and repositioning    Physical Environment:Room free from clutter, Clear path to bathroom , Adequate lighting, Bed alarm functioning and No safety hazards noted    Pain Rating/ Nonverbal Pain Behaviors:denies;     Pain interventions Attempted: Turned and repositioned, pillows used for positioning    Patient Toileted:Incontinent and Bowel Movement

## 2020-11-06 NOTE — PROGRESS NOTES
Senior Purposeful Rounding    Position:Sitting    Physical Environment:No safety hazards noted and Chair alarm in place and functioning    Pain Rating/ Nonverbal Pain Behaviors:denies;  No pain behaviors noted    Pain interventions Attempted: N/A    Patient Toileted:Continent, Incontinent and Bowel Movement - Continent of urine, incontinent of small BM

## 2020-11-07 LAB
ANION GAP SERPL CALCULATED.3IONS-SCNC: 9 MMOL/L (ref 3–16)
BACTERIA: ABNORMAL /HPF
BASOPHILS ABSOLUTE: 0 K/UL (ref 0–0.2)
BASOPHILS RELATIVE PERCENT: 0.4 %
BILIRUBIN URINE: NEGATIVE
BLOOD, URINE: NEGATIVE
BUN BLDV-MCNC: 44 MG/DL (ref 7–20)
CALCIUM SERPL-MCNC: 8.8 MG/DL (ref 8.3–10.6)
CHLORIDE BLD-SCNC: 96 MMOL/L (ref 99–110)
CLARITY: CLEAR
CO2: 27 MMOL/L (ref 21–32)
COLOR: YELLOW
CREAT SERPL-MCNC: 0.8 MG/DL (ref 0.6–1.2)
EOSINOPHILS ABSOLUTE: 1.1 K/UL (ref 0–0.6)
EOSINOPHILS RELATIVE PERCENT: 10.6 %
GFR AFRICAN AMERICAN: >60
GFR NON-AFRICAN AMERICAN: >60
GLUCOSE BLD-MCNC: 135 MG/DL (ref 70–99)
GLUCOSE URINE: NEGATIVE MG/DL
HCT VFR BLD CALC: 30.2 % (ref 36–48)
HEMOGLOBIN: 10.3 G/DL (ref 12–16)
KETONES, URINE: NEGATIVE MG/DL
LEUKOCYTE ESTERASE, URINE: ABNORMAL
LYMPHOCYTES ABSOLUTE: 2 K/UL (ref 1–5.1)
LYMPHOCYTES RELATIVE PERCENT: 18.3 %
MCH RBC QN AUTO: 31.8 PG (ref 26–34)
MCHC RBC AUTO-ENTMCNC: 34 G/DL (ref 31–36)
MCV RBC AUTO: 93.6 FL (ref 80–100)
MICROSCOPIC EXAMINATION: YES
MONOCYTES ABSOLUTE: 0.9 K/UL (ref 0–1.3)
MONOCYTES RELATIVE PERCENT: 8.2 %
MUCUS: ABNORMAL /LPF
NEUTROPHILS ABSOLUTE: 6.8 K/UL (ref 1.7–7.7)
NEUTROPHILS RELATIVE PERCENT: 62.5 %
NITRITE, URINE: NEGATIVE
PDW BLD-RTO: 14.8 % (ref 12.4–15.4)
PH UA: 7 (ref 5–8)
PLATELET # BLD: 265 K/UL (ref 135–450)
PMV BLD AUTO: 7.2 FL (ref 5–10.5)
POTASSIUM SERPL-SCNC: 4.3 MMOL/L (ref 3.5–5.1)
PROTEIN UA: NEGATIVE MG/DL
RBC # BLD: 3.23 M/UL (ref 4–5.2)
RBC UA: ABNORMAL /HPF (ref 0–4)
SARS-COV-2, NAAT: NOT DETECTED
SODIUM BLD-SCNC: 132 MMOL/L (ref 136–145)
SPECIFIC GRAVITY UA: 1.02 (ref 1–1.03)
URINE REFLEX TO CULTURE: YES
URINE TYPE: ABNORMAL
UROBILINOGEN, URINE: 1 E.U./DL
WBC # BLD: 10.9 K/UL (ref 4–11)
WBC UA: ABNORMAL /HPF (ref 0–5)

## 2020-11-07 PROCEDURE — 80048 BASIC METABOLIC PNL TOTAL CA: CPT

## 2020-11-07 PROCEDURE — 87186 SC STD MICRODIL/AGAR DIL: CPT

## 2020-11-07 PROCEDURE — 51701 INSERT BLADDER CATHETER: CPT

## 2020-11-07 PROCEDURE — 99233 SBSQ HOSP IP/OBS HIGH 50: CPT | Performed by: PSYCHIATRY & NEUROLOGY

## 2020-11-07 PROCEDURE — U0002 COVID-19 LAB TEST NON-CDC: HCPCS

## 2020-11-07 PROCEDURE — 1240000000 HC EMOTIONAL WELLNESS R&B

## 2020-11-07 PROCEDURE — 81001 URINALYSIS AUTO W/SCOPE: CPT

## 2020-11-07 PROCEDURE — 97150 GROUP THERAPEUTIC PROCEDURES: CPT

## 2020-11-07 PROCEDURE — 6370000000 HC RX 637 (ALT 250 FOR IP): Performed by: PSYCHIATRY & NEUROLOGY

## 2020-11-07 PROCEDURE — 87086 URINE CULTURE/COLONY COUNT: CPT

## 2020-11-07 PROCEDURE — 6370000000 HC RX 637 (ALT 250 FOR IP): Performed by: PHYSICIAN ASSISTANT

## 2020-11-07 PROCEDURE — 87077 CULTURE AEROBIC IDENTIFY: CPT

## 2020-11-07 PROCEDURE — 6370000000 HC RX 637 (ALT 250 FOR IP): Performed by: NURSE PRACTITIONER

## 2020-11-07 PROCEDURE — 36415 COLL VENOUS BLD VENIPUNCTURE: CPT

## 2020-11-07 PROCEDURE — 85025 COMPLETE CBC W/AUTO DIFF WBC: CPT

## 2020-11-07 RX ADMIN — ASPIRIN 81 MG: 81 TABLET, COATED ORAL at 09:11

## 2020-11-07 RX ADMIN — RISPERIDONE 0.5 MG: 0.25 TABLET ORAL at 20:09

## 2020-11-07 RX ADMIN — LOSARTAN POTASSIUM 100 MG: 100 TABLET, FILM COATED ORAL at 09:09

## 2020-11-07 RX ADMIN — TRAMADOL HYDROCHLORIDE 50 MG: 50 TABLET, FILM COATED ORAL at 20:14

## 2020-11-07 RX ADMIN — POLYETHYLENE GLYCOL (3350) 17 G: 17 POWDER, FOR SOLUTION ORAL at 09:12

## 2020-11-07 RX ADMIN — TRAZODONE HYDROCHLORIDE 50 MG: 50 TABLET ORAL at 20:09

## 2020-11-07 RX ADMIN — METOPROLOL SUCCINATE 100 MG: 50 TABLET, EXTENDED RELEASE ORAL at 09:09

## 2020-11-07 RX ADMIN — LEVOTHYROXINE SODIUM 50 MCG: 50 TABLET ORAL at 09:08

## 2020-11-07 RX ADMIN — RISPERIDONE 0.5 MG: 0.25 TABLET ORAL at 09:08

## 2020-11-07 RX ADMIN — PANTOPRAZOLE SODIUM 40 MG: 40 TABLET, DELAYED RELEASE ORAL at 09:09

## 2020-11-07 RX ADMIN — TRAMADOL HYDROCHLORIDE 50 MG: 50 TABLET, FILM COATED ORAL at 01:43

## 2020-11-07 ASSESSMENT — PAIN DESCRIPTION - ONSET: ONSET: ON-GOING

## 2020-11-07 ASSESSMENT — PAIN SCALES - GENERAL
PAINLEVEL_OUTOF10: 0
PAINLEVEL_OUTOF10: 8
PAINLEVEL_OUTOF10: 5

## 2020-11-07 ASSESSMENT — PAIN DESCRIPTION - DESCRIPTORS: DESCRIPTORS: ACHING;THROBBING

## 2020-11-07 ASSESSMENT — PAIN DESCRIPTION - PROGRESSION: CLINICAL_PROGRESSION: NOT CHANGED

## 2020-11-07 ASSESSMENT — PAIN DESCRIPTION - FREQUENCY: FREQUENCY: CONTINUOUS

## 2020-11-07 ASSESSMENT — PAIN DESCRIPTION - PAIN TYPE: TYPE: CHRONIC PAIN

## 2020-11-07 ASSESSMENT — PAIN DESCRIPTION - LOCATION: LOCATION: BACK;LEG

## 2020-11-07 ASSESSMENT — PAIN - FUNCTIONAL ASSESSMENT: PAIN_FUNCTIONAL_ASSESSMENT: PREVENTS OR INTERFERES SOME ACTIVE ACTIVITIES AND ADLS

## 2020-11-07 ASSESSMENT — PAIN DESCRIPTION - ORIENTATION: ORIENTATION: RIGHT;LEFT;UPPER;LOWER

## 2020-11-07 NOTE — BH NOTE
Senior Purposeful Rounding     Position:Sitting in recliner            Physical Environment:chair alarm in place and functioning     Pain Rating/ Nonverbal Pain Behaviors:denies; No pain behaviors noted     Pain interventions Attempted: N/A     Patient Toileted: incontinent x2, no void

## 2020-11-07 NOTE — BH NOTE
Provider notified of BUN increase. Pt c/o bladder cramping overnight, feeling urge to void but unable to do so. Bladder scanned per order, 234ml. UA via straight cath ordered due to pt being incontinent x2. Provider encouraged increase in po fluid intake and for PA/NP to assess/review on 11/8.

## 2020-11-07 NOTE — BH NOTE
Pt repositioned and c/o of generalized pain and pain in bilateral legs. Tramadol PO PRN given @ 0143. Will monitor.

## 2020-11-07 NOTE — BH NOTE
Senior Purposeful Rounding     Position:Repositions Self     Physical Environment:No safety hazards noted     Pain Rating/ Nonverbal Pain Behaviors:0;      Pain interventions Attempted: n/a     Patient Toileted: incontinent

## 2020-11-07 NOTE — BH NOTE
Senior Purposeful Rounding     Position:Sitting in recliner            Physical Environment:chair alarm in place and functioning     Pain Rating/ Nonverbal Pain Behaviors:denies; No pain behaviors noted     Pain interventions Attempted: N/A     Patient Toileted: incontinent x2, urine void, declined change at this time

## 2020-11-08 LAB
ANION GAP SERPL CALCULATED.3IONS-SCNC: 11 MMOL/L (ref 3–16)
BUN BLDV-MCNC: 36 MG/DL (ref 7–20)
CALCIUM SERPL-MCNC: 9.1 MG/DL (ref 8.3–10.6)
CHLORIDE BLD-SCNC: 95 MMOL/L (ref 99–110)
CO2: 26 MMOL/L (ref 21–32)
CREAT SERPL-MCNC: 0.7 MG/DL (ref 0.6–1.2)
GFR AFRICAN AMERICAN: >60
GFR NON-AFRICAN AMERICAN: >60
GLUCOSE BLD-MCNC: 129 MG/DL (ref 70–99)
POTASSIUM SERPL-SCNC: 4.7 MMOL/L (ref 3.5–5.1)
SODIUM BLD-SCNC: 132 MMOL/L (ref 136–145)

## 2020-11-08 PROCEDURE — 80048 BASIC METABOLIC PNL TOTAL CA: CPT

## 2020-11-08 PROCEDURE — 36415 COLL VENOUS BLD VENIPUNCTURE: CPT

## 2020-11-08 PROCEDURE — 6370000000 HC RX 637 (ALT 250 FOR IP): Performed by: PSYCHIATRY & NEUROLOGY

## 2020-11-08 PROCEDURE — 6370000000 HC RX 637 (ALT 250 FOR IP): Performed by: NURSE PRACTITIONER

## 2020-11-08 PROCEDURE — 6370000000 HC RX 637 (ALT 250 FOR IP): Performed by: PHYSICIAN ASSISTANT

## 2020-11-08 PROCEDURE — 1240000000 HC EMOTIONAL WELLNESS R&B

## 2020-11-08 RX ADMIN — POLYETHYLENE GLYCOL (3350) 17 G: 17 POWDER, FOR SOLUTION ORAL at 08:47

## 2020-11-08 RX ADMIN — RISPERIDONE 0.5 MG: 0.25 TABLET ORAL at 20:22

## 2020-11-08 RX ADMIN — ASPIRIN 81 MG: 81 TABLET, COATED ORAL at 08:47

## 2020-11-08 RX ADMIN — RISPERIDONE 0.5 MG: 0.25 TABLET ORAL at 08:47

## 2020-11-08 RX ADMIN — LEVOTHYROXINE SODIUM 50 MCG: 50 TABLET ORAL at 06:18

## 2020-11-08 RX ADMIN — METOPROLOL SUCCINATE 100 MG: 50 TABLET, EXTENDED RELEASE ORAL at 08:47

## 2020-11-08 RX ADMIN — PANTOPRAZOLE SODIUM 40 MG: 40 TABLET, DELAYED RELEASE ORAL at 06:18

## 2020-11-08 RX ADMIN — TRAMADOL HYDROCHLORIDE 50 MG: 50 TABLET, FILM COATED ORAL at 06:18

## 2020-11-08 RX ADMIN — LOSARTAN POTASSIUM 100 MG: 100 TABLET, FILM COATED ORAL at 08:47

## 2020-11-08 RX ADMIN — TRAMADOL HYDROCHLORIDE 50 MG: 50 TABLET, FILM COATED ORAL at 20:22

## 2020-11-08 ASSESSMENT — PAIN DESCRIPTION - LOCATION
LOCATION: BACK;LEG
LOCATION: BACK;HIP

## 2020-11-08 ASSESSMENT — PAIN DESCRIPTION - PROGRESSION
CLINICAL_PROGRESSION: GRADUALLY IMPROVING
CLINICAL_PROGRESSION: NOT CHANGED

## 2020-11-08 ASSESSMENT — PAIN DESCRIPTION - FREQUENCY
FREQUENCY: CONTINUOUS
FREQUENCY: CONTINUOUS

## 2020-11-08 ASSESSMENT — PAIN DESCRIPTION - ORIENTATION
ORIENTATION: RIGHT;LEFT;LOWER
ORIENTATION: RIGHT;LEFT;LOWER

## 2020-11-08 ASSESSMENT — PAIN DESCRIPTION - PAIN TYPE
TYPE: CHRONIC PAIN
TYPE: CHRONIC PAIN

## 2020-11-08 ASSESSMENT — PAIN DESCRIPTION - DESCRIPTORS
DESCRIPTORS: ACHING
DESCRIPTORS: ACHING

## 2020-11-08 ASSESSMENT — PAIN SCALES - GENERAL
PAINLEVEL_OUTOF10: 2
PAINLEVEL_OUTOF10: 6
PAINLEVEL_OUTOF10: 10

## 2020-11-08 ASSESSMENT — PAIN DESCRIPTION - ONSET
ONSET: ON-GOING
ONSET: ON-GOING

## 2020-11-08 NOTE — BH NOTE
Senior Purposeful Rounding    Position:Sitting    Physical Environment:Chair alarm in place and functioning    Pain Rating/ Nonverbal Pain Behaviors:5; back, legs, feet (chronic)    Pain interventions Attempted: Tramadol given per [facility]    Patient Toileted:Incontinent

## 2020-11-08 NOTE — PLAN OF CARE
Problem: Falls - Risk of:  Goal: Will remain free from falls  Description: Will remain free from falls  11/7/2020 2216 by Enrique Infante RN  Outcome: Ongoing  11/7/2020 1124 by Rj Macias RN  Outcome: Ongoing     Problem: Altered Mood, Deterioration in Function:  Goal: Ability to perform activities of daily living will improve  Description: Ability to perform activities of daily living will improve  Outcome: Ongoing     Problem: Skin Integrity:  Goal: Will show no infection signs and symptoms  Description: Will show no infection signs and symptoms  Outcome: Ongoing

## 2020-11-08 NOTE — BH NOTE
Senior Purposeful Rounding    Position:Left Side and Repositions Self    Physical Environment:Room free from clutter, Clear path to bathroom , Adequate lighting, Bed alarm functioning and No safety hazards noted    Pain Rating/ Nonverbal Pain Behaviors: no signs of pain    Pain interventions Attempted: n/a    Patient Toileted:No- Void

## 2020-11-08 NOTE — BH NOTE
Show:Clear all  [x]Manual[x]Template[]Copied     Position:Sitting     Physical Environment:Room free from clutter, No safety hazards noted and Chair alarm in place and functioning     Pain Rating/ Nonverbal Pain Behaviors:denies     Pain interventions Attempted:no     Patient Toileted:Incontinent

## 2020-11-08 NOTE — BH NOTE
Senior Purposeful Rounding    Position:Sitting    Physical Environment:Room free from clutter, No safety hazards noted and Chair alarm in place and functioning    Pain Rating/ Nonverbal Pain Behaviors:denies    Pain interventions Attempted:no    Patient Toileted:Incontinent

## 2020-11-08 NOTE — BH NOTE
Pt is A+Ox2, calm, pleasant, cooperative and medication compliant. Pt denies SI/HI/AVH and no RTIS observed. Trazodone given @2009 per patient request. Tramadol given @2014 for complaints of chronic pain in back, legs, and feet. Pt denies any other needs at this time.

## 2020-11-08 NOTE — PROGRESS NOTES
Department of Psychiatry  Progress Note    Admission Date: 10/30/2020    Chief Complaint / Reason for Admission: Psychosis     Patient's chart was reviewed, case was discussed with nursing/OT/RT staff, and collaborated with  about the treatment plan. SUBJECTIVE:  Over last 24 hours:  Behavioral outbursts: No  Non-aggressive behavioral disturbance: Yes  Medication compliant: Yes  Need for seclusion/restraints: No  Sleeping adequately: Yes  Appetite adequate: Yes  Attending groups: Select but does not engage much in group activities    Soumya Branch has been visible on the unit today, she has been sitting in the delores chair. Pt stated that she is feeling good but stated that she feels like she is reatining urined and feels like she needs to go but nothing comes out. Pt stated that this is keeping her up at night. Pt denies side effects from meds. She did not verbalize any overt delusional thought content or paranoia when talking to me but responses were brief. She denied needs or concerns at this time. No verbal or behavioral outbursts, no aggression.       Suicidal ideation: None verbalized  Homicidal ideation: None verbalized  Medication side effects: None    ROS: Patient has new complaints: No    Current Medications Ordered:   risperiDONE  0.5 mg Oral BID    levothyroxine  50 mcg Oral QAM AC    polyethylene glycol  17 g Oral Daily    aspirin  81 mg Oral Daily    losartan  100 mg Oral Daily    metoprolol succinate  100 mg Oral Daily    NIFEdipine  30 mg Oral Nightly    pantoprazole  40 mg Oral QAM AC      PRN Meds: medicated lip balm, acetaminophen, hydrOXYzine, OLANZapine **OR** OLANZapine, sterile water, traZODone, benztropine mesylate, magnesium hydroxide, aluminum & magnesium hydroxide-simethicone, LORazepam, traMADol, hydrALAZINE     Objective:     PE:    /69   Pulse 66   Temp 96.9 °F (36.1 °C) (Skin)   Resp 16   Ht 5' 5\" (1.651 m)   Wt 113 lb (51.3 kg)   SpO2 97%   BMI 18.80 kg/m² Motor / Gait: Unable to ambulate, requires lift for transfer    Mental Status Examination:   Appearance: WF, appears stated age, in chair, wearing hospital attire, fair grooming and hygiene   Behavior/Attitude Toward Examiner: Overall cooperative   Speech: Spontaneous, normal rate, normal volume   Mood: \"tired\"  Affect: Blunted  Thought Processes: liner but concrete  Thought Content: focus on her physical sx's  Perceptions: not rtis and denies  Attention: Impaired  Cognition: Average RAQUEL, alert and oriented to person, place  Insight: Impaired insight   Judgment: Impaired judgment      LAB: Reviewed labs from last 24 hours      Dx:   Primary Psychiatric (DSM V) Diagnosis: Psychosis, unspecified  Secondary Psychiatric (DSM V) Diagnoses: Depression, per history   Chemical Dependency Diagnoses: None  Active Medical Diagnoses: Hypothyroidism, elevated TSH, HTN, GERD, PAF, chronic diastolic CHF, chronic low back pain, mild CAD    All conditions detailed above are being treated while patient is hospitalized. Tx Plan: Generally: prevent self injury/aggression towards others, stabilize mood/anxiety/psychotic/behavioral disturbance, establish/maintain aftercare, increase coping mechanisms, improve medication compliance. All conditions present on admission are being treated while pt is hospitalized. Legal Status: Voluntary    Primary Psychiatric Issues:   1. Psychosis, unspecified  - will cont meds as prescribed    Chemical Dependency Issues:  - No issues    Function:  - Consulted physical therapy - appreciate recs  - Consulted occupational therapy - appreciate recs  - Falls precautions    Medical Problems:  Internal medicine has been consulted. Appreciate recs.       Code Status: Full Code    Disposition:    - Housing: Lives with son  - Current outpatient follow-up: PCP  - Discharge planning is incomplete    Estimated Length of Stay: per primary team     Criteria for Discharge: Not suicidal, not homicidal, not

## 2020-11-08 NOTE — PLAN OF CARE
Problem: Falls - Risk of:  Goal: Will remain free from falls  Description: Will remain free from falls  11/8/2020 1036 by Felicia Romero RN  Outcome: Ongoing  11/7/2020 2216 by Yina Mai RN  Outcome: Ongoing     Problem: Falls - Risk of:  Goal: Absence of physical injury  Description: Absence of physical injury  Outcome: Ongoing       Problem: Altered Mood, Deterioration in Function:  Goal: Able to verbalize reality based thinking  Description: Able to verbalize reality based thinking  Outcome: Ongoing     Problem: Altered Mood, Deterioration in Function:  Goal: Maintenance of adequate nutrition will improve  Description: Maintenance of adequate nutrition will improve  Outcome: Ongoing     Problem: Altered Mood, Psychotic Behavior:  Goal: Ability to achieve adequate nutritional intake will improve  Description: Ability to achieve adequate nutritional intake will improve  Outcome: Ongoing     Problem: Pain:  Goal: Control of chronic pain  Description: Control of chronic pain  Outcome: Ongoing   Leighlilibby Hernandez is visible in the dayroom sitting in a gerichair. She is alert, oriented x 2-3 and pleasant and was able to recall this RN's name from last week. She is up to chair using the lift for safety as she is unable to bear any weight. She is dependant on staff for personal care and requires incontinence care. She does feed herself well, taking most meals fairly well, as well as drinking the suppliments. Fluids encouraged and tolerates well.

## 2020-11-08 NOTE — BH NOTE
Urine sample taken via straight cath with no complications under sterile technique. Sample given to lab.

## 2020-11-09 VITALS
HEART RATE: 66 BPM | WEIGHT: 113 LBS | TEMPERATURE: 97.8 F | OXYGEN SATURATION: 96 % | SYSTOLIC BLOOD PRESSURE: 145 MMHG | DIASTOLIC BLOOD PRESSURE: 76 MMHG | BODY MASS INDEX: 18.83 KG/M2 | HEIGHT: 65 IN | RESPIRATION RATE: 18 BRPM

## 2020-11-09 PROBLEM — F03.91 MAJOR NEUROCOGNITIVE DISORDER DUE TO ALZHEIMER'S DISEASE, PROBABLE, WITH BEHAVIORAL DISTURBANCE: Status: ACTIVE | Noted: 2020-10-30

## 2020-11-09 PROCEDURE — 6370000000 HC RX 637 (ALT 250 FOR IP): Performed by: PSYCHIATRY & NEUROLOGY

## 2020-11-09 PROCEDURE — 99239 HOSP IP/OBS DSCHRG MGMT >30: CPT | Performed by: PSYCHIATRY & NEUROLOGY

## 2020-11-09 PROCEDURE — 6370000000 HC RX 637 (ALT 250 FOR IP): Performed by: NURSE PRACTITIONER

## 2020-11-09 PROCEDURE — 99231 SBSQ HOSP IP/OBS SF/LOW 25: CPT | Performed by: PHYSICIAN ASSISTANT

## 2020-11-09 PROCEDURE — 5130000000 HC BRIDGE APPOINTMENT

## 2020-11-09 PROCEDURE — 6370000000 HC RX 637 (ALT 250 FOR IP): Performed by: PHYSICIAN ASSISTANT

## 2020-11-09 RX ORDER — RISPERIDONE 0.5 MG/1
0.5 TABLET, FILM COATED ORAL 2 TIMES DAILY
Qty: 60 TABLET | Refills: 0 | Status: SHIPPED | OUTPATIENT
Start: 2020-11-09 | End: 2021-10-21

## 2020-11-09 RX ADMIN — PANTOPRAZOLE SODIUM 40 MG: 40 TABLET, DELAYED RELEASE ORAL at 05:50

## 2020-11-09 RX ADMIN — RISPERIDONE 0.5 MG: 0.25 TABLET ORAL at 08:27

## 2020-11-09 RX ADMIN — LEVOTHYROXINE SODIUM 50 MCG: 50 TABLET ORAL at 05:51

## 2020-11-09 RX ADMIN — ASPIRIN 81 MG: 81 TABLET, COATED ORAL at 08:27

## 2020-11-09 RX ADMIN — METOPROLOL SUCCINATE 100 MG: 50 TABLET, EXTENDED RELEASE ORAL at 08:27

## 2020-11-09 RX ADMIN — POLYETHYLENE GLYCOL (3350) 17 G: 17 POWDER, FOR SOLUTION ORAL at 08:27

## 2020-11-09 RX ADMIN — TRAMADOL HYDROCHLORIDE 50 MG: 50 TABLET, FILM COATED ORAL at 11:51

## 2020-11-09 RX ADMIN — LOSARTAN POTASSIUM 100 MG: 100 TABLET, FILM COATED ORAL at 08:27

## 2020-11-09 ASSESSMENT — PAIN DESCRIPTION - PROGRESSION
CLINICAL_PROGRESSION: NOT CHANGED
CLINICAL_PROGRESSION: NOT CHANGED
CLINICAL_PROGRESSION: GRADUALLY WORSENING

## 2020-11-09 ASSESSMENT — PAIN SCALES - GENERAL
PAINLEVEL_OUTOF10: 8

## 2020-11-09 ASSESSMENT — PAIN DESCRIPTION - ORIENTATION
ORIENTATION: LOWER;UPPER

## 2020-11-09 ASSESSMENT — PAIN DESCRIPTION - DESCRIPTORS
DESCRIPTORS: ACHING

## 2020-11-09 ASSESSMENT — PAIN DESCRIPTION - LOCATION
LOCATION: BACK

## 2020-11-09 ASSESSMENT — PAIN DESCRIPTION - FREQUENCY
FREQUENCY: INTERMITTENT

## 2020-11-09 ASSESSMENT — PAIN - FUNCTIONAL ASSESSMENT
PAIN_FUNCTIONAL_ASSESSMENT: PREVENTS OR INTERFERES WITH MANY ACTIVE NOT PASSIVE ACTIVITIES

## 2020-11-09 ASSESSMENT — PAIN DESCRIPTION - ONSET
ONSET: ON-GOING

## 2020-11-09 ASSESSMENT — PAIN DESCRIPTION - PAIN TYPE
TYPE: CHRONIC PAIN

## 2020-11-09 NOTE — PROGRESS NOTES
Senior Purposeful Rounding    Position:Right Side    Physical Environment:Bed alarm functioning and No safety hazards noted    Pain Rating/ Nonverbal Pain Behaviors:2; n/a    Pain interventions Attempted: prn tramadol given    Patient Toileted:Incontinent

## 2020-11-09 NOTE — PLAN OF CARE
Problem: Falls - Risk of:  Goal: Will remain free from falls  Description: Will remain free from falls  11/8/2020 2213 by Jackson Lopez RN  Outcome: Ongoing     Problem: Altered Mood, Deterioration in Function:  Goal: Ability to perform activities of daily living will improve  Description: Ability to perform activities of daily living will improve  Outcome: Ongoing    Pt has been pleasant and cooperative with staff. She denies SI/HI. She did endorse hearing voices during the shift. She was medication compliant. Prn tramadol given at 2022 for 10/10 back and bilateral leg pain. Medication was effective along with rest and repositioning. Pt accepted snacks and drinks. Pt is currently laying on her right side. Fall precautions are currently in place. Denies needs at this time.  Electronically signed by Jackson Lopez RN on 11/8/2020 at 10:17 PM

## 2020-11-09 NOTE — BH NOTE
NISHA met with pt for a 1:1 session for approximately 30 minutes. Pt wanted her nails done. NISHA cleaned her hands, painted them, and put lotion on her hands. Pt expressed enjoyment and was observed smiling and socializing positively during session.     Tamera Aquino, NISHA

## 2020-11-09 NOTE — BH NOTE
[facility]  Discharge Note    Pt discharged with followings belongings:   Dentures: Lowers, Uppers  Vision - Corrective Lenses: Glasses(Readers)  Hearing Aid: Bilateral hearing aids(Didn't bring)  Jewelry: None  Body Piercings Removed: N/A  Clothing: None  Were All Patient Medications Collected?: Not Applicable  Other Valuables: Cell phone   Valuables sent home with pt. Valuables retrieved from safe, Security envelope number:  n/a and returned to patient. Patient education on aftercare instructions: given to son/POA. Information faxed to Jocelynn Lenzza at [phone] and [address] [place] [address] at [phone] by L. Tommy Eisenmenger, RN. Patient verbalize understanding of AVS:  Son verbalized understanding. Status EXAM upon discharge:  Status and Exam  Normal: No  Facial Expression: Brightened  Affect: Congruent  Level of Consciousness: Alert  Mood:Normal: No  Mood: Sad  Motor Activity:Normal: No  Motor Activity: Decreased  Interview Behavior: Cooperative  Preception: [place] to Person, [place] to Place  Attention:Normal: No  Attention: Distractible  Thought Processes: Circumstantial  Thought Content:Normal: No  Thought Content: Preoccupations  Hallucinations:  Auditory (Comment)  Delusions: No  Delusions: (mild paranoia)  Memory:Normal: No  Memory: Poor Recent  Insight and Judgment: No  Insight and Judgment: Poor Judgment, Poor Insight  Present Suicidal Ideation: No  Present Homicidal Ideation: No      Metabolic Screening:    Lab Results   Component Value Date    LABA1C 6.2 10/31/2020       Lab Results   Component Value Date    CHOL 170 10/31/2020    CHOL 184 06/30/2016     Lab Results   Component Value Date    TRIG 102 10/31/2020    TRIG 71 06/30/2016     Lab Results   Component Value Date    HDL 42 10/31/2020    HDL 53 06/30/2016     No components found for: [facility]  Lab Results   Component Value Date    LABVLDL 20 10/31/2020    LABVLDL 14 06/30/2016     Bridge Appointment completed: Reviewed Discharge Instructions with patient. Patient verbalizes understanding and agreement with the discharge plan using the teachback method.      Referral for Outpatient Tobacco Cessation Counseling, upon discharge (juan X if applicable and completed):    ( )  Hospital staff assisted patient to call Quit Line or faxed referral                                   during hospitalization                  ( )  Recognizing danger situations (included triggers and roadblocks), if not completed on admission                    ( )  Coping skills (new ways to manage stress, exercise, relaxation techniques, changing routine, distraction), if not completed on admission                                                           ( )  Basic information about quitting (benefits of quitting, techniques in how to quit, available resources, if not completed on admission  ( ) [facility]   (x) Patient refused referral  (x) Patient refused counseling  (x) Patient refused smoking cessation medication upon discharge    Vaccinations (juan X if applicable and completed):  ( ) Patient states already received influenza vaccine elsewhere  ( ) Patient received influenza vaccine during this hospitalization  (x) Patient refused influenza vaccine at this time      Keaton Beebe RN

## 2020-11-09 NOTE — PROGRESS NOTES
Senior Purposeful Rounding    Position:Sitting and Back    Physical Environment:No safety hazards noted and Chair alarm in place and functioning    Pain Rating/ Nonverbal Pain Behaviors:8; pt sitting in recliner in dayroom    Pain interventions Attempted: heat pad. Pt declined all other interventions and PRN pain medication at this time.      Patient Toileted:Incontinent and No- Void

## 2020-11-09 NOTE — DISCHARGE SUMMARY
Geriatric Psychiatry Discharge Summary     Admit Date: 10/30/2020     Discharge Date:  11/9/2020      Discharge Diagnoses:  Primary Psychiatric (DSM V) Diagnosis: Major neurocognitive disorder due to Alzheimer's disease, possible, with behavioral disturbance  Secondary Psychiatric (DSM V) Diagnoses: Depression, per history   Chemical Dependency Diagnoses: None  Active Medical Diagnoses: Hypothyroidism, elevated TSH, HTN, GERD, PAF, chronic diastolic CHF, chronic low back pain, mild CAD    All psychiatric conditions and active medical problems above on were treated while patient was hospitalized. Disposition -  Home     Discharge Meds:       Medication List      START taking these medications    risperiDONE 0.5 MG tablet  Commonly known as:  RISPERDAL  Take 1 tablet by mouth 2 times daily        CONTINUE taking these medications    aspirin 81 MG EC tablet     hydrALAZINE 100 MG tablet  Commonly known as:  APRESOLINE  Take 1 tablet by mouth 3 times daily as needed (SBP > 160 mmHg)     levothyroxine 25 MCG tablet  Commonly known as:  SYNTHROID  Take 2 tablets by mouth Daily     losartan 100 MG tablet  Commonly known as:  COZAAR  TAKE 1 TABLET EVERY DAY     metoprolol succinate 100 MG extended release tablet  Commonly known as:  TOPROL XL  TAKE 1 TABLET EVERY DAY     NIFEdipine 30 MG extended release tablet  Commonly known as:  ADALAT CC     omeprazole 20 MG delayed release capsule  Commonly known as:  PRILOSEC  TAKE 1 CAPSULE EVERY DAY     traMADol 50 MG tablet  Commonly known as:  ULTRAM        STOP taking these medications    ALPRAZolam 0.25 MG tablet  Commonly known as:  Sampson Kocher           Where to Get Your Medications      You can get these medications from any pharmacy    Bring a paper prescription for each of these medications  · risperiDONE 0.5 MG tablet         Multiple Neuroleptics?  No    Reason for admission: Psychotic symptoms    Hospital Course:  Patient lives at home with her son who is her primary

## 2020-11-09 NOTE — PROGRESS NOTES
Senior Purposeful Rounding    Position:Right Side    Physical Environment:Bed alarm functioning and No safety hazards noted    Pain Rating/ Nonverbal Pain Behaviors:2; n/a    Pain interventions Attempted: repositioned, pillows    Patient Toileted:Incontinent

## 2020-11-09 NOTE — PLAN OF CARE
Problem: Skin Integrity:  Goal: Absence of new skin breakdown  Description: Absence of new skin breakdown  Outcome: Met This Shift     Problem: Falls - Risk of:  Goal: Will remain free from falls  Description: Will remain free from falls  11/9/2020 1049 by Lou Nguyen RN  Outcome: Met This Shift     Problem: Falls - Risk of:  Goal: Absence of physical injury  Description: Absence of physical injury  Outcome: Met This Shift     Problem: Altered Mood, Deterioration in Function:  Goal: Maintenance of adequate nutrition will improve  Description: Maintenance of adequate nutrition will improve  Outcome: Met This Shift     Problem: Altered Mood, Deterioration in Function:  Goal: Ability to tolerate increased activity will improve  Description: Ability to tolerate increased activity will improve  Outcome: Ongoing   Pt has remained free from falls and injury so far this shift. Med compliant. C/o pain 8/10 but declined PRN pain meds. Heat pack, repositioning, therapeutic touch, and emotional support given. Appetite adequate. Resting quietly in recliner at this time. Will continue to monitor.

## 2020-11-09 NOTE — PROGRESS NOTES
Senior Purposeful Rounding    Position:Right Side    Physical Environment:Bed alarm functioning and No safety hazards noted    Pain Rating/ Nonverbal Pain Behaviors:0; none observed    Pain interventions Attempted: n/a    Patient Toileted:Incontinent

## 2020-11-09 NOTE — PROGRESS NOTES
Senior Purposeful Rounding    Position:Sitting    Physical Environment:No safety hazards noted and Chair alarm in place and functioning    Pain Rating/ Nonverbal Pain Behaviors:8; pt c/o back pain    Pain interventions Attempted: Heat pad and repositioning. Pt declined all other nonpharm interventions and PRN pain medication offered.      Patient Toileted:Incontinent and No- Void

## 2020-11-09 NOTE — PROGRESS NOTES
Senior Purposeful Rounding    Position:Left Side    Physical Environment:Bed alarm functioning and No safety hazards noted    Pain Rating/ Nonverbal Pain Behaviors:0; none observed    Pain interventions Attempted: n/a    Patient Toileted:Incontinent

## 2020-11-09 NOTE — PROGRESS NOTES
Senior Purposeful Rounding    Position:Left Side    Physical Environment:Bed alarm functioning and No safety hazards noted    Pain Rating/ Nonverbal Pain Behaviors:2; grimacing    Pain interventions Attempted: repositioned    Patient Toileted: Bowel Movement, urinated

## 2020-11-10 LAB
ORGANISM: ABNORMAL
URINE CULTURE, ROUTINE: ABNORMAL

## 2021-06-10 ENCOUNTER — HOSPITAL ENCOUNTER (EMERGENCY)
Age: 86
Discharge: HOME | End: 2021-06-10
Payer: MEDICARE

## 2021-06-10 VITALS
TEMPERATURE: 98.3 F | DIASTOLIC BLOOD PRESSURE: 73 MMHG | WEIGHT: 145 LBS | HEART RATE: 58 BPM | BODY MASS INDEX: 23.3 KG/M2 | HEIGHT: 66 IN | OXYGEN SATURATION: 98 % | SYSTOLIC BLOOD PRESSURE: 201 MMHG | RESPIRATION RATE: 20 BRPM

## 2021-06-10 DIAGNOSIS — N39.0 URINARY TRACT INFECTION WITHOUT HEMATURIA, SITE UNSPECIFIED: Primary | ICD-10-CM

## 2021-06-10 DIAGNOSIS — R52 BODY ACHES: ICD-10-CM

## 2021-06-10 LAB
A/G RATIO: 1.1 (ref 1.1–2.2)
ALBUMIN SERPL-MCNC: 3.1 G/DL (ref 3.4–5)
ALP BLD-CCNC: 74 U/L (ref 40–129)
ALT SERPL-CCNC: 7 U/L (ref 10–40)
ANION GAP SERPL CALCULATED.3IONS-SCNC: 8 MMOL/L (ref 3–16)
AST SERPL-CCNC: 12 U/L (ref 15–37)
BACTERIA: ABNORMAL /HPF
BASOPHILS ABSOLUTE: 0.1 K/UL (ref 0–0.2)
BASOPHILS RELATIVE PERCENT: 0.9 %
BILIRUB SERPL-MCNC: 0.4 MG/DL (ref 0–1)
BILIRUBIN URINE: NEGATIVE
BLOOD, URINE: ABNORMAL
BUN BLDV-MCNC: 30 MG/DL (ref 7–20)
CALCIUM SERPL-MCNC: 9.6 MG/DL (ref 8.3–10.6)
CHLORIDE BLD-SCNC: 104 MMOL/L (ref 99–110)
CLARITY: CLEAR
CO2: 26 MMOL/L (ref 21–32)
COLOR: YELLOW
CREAT SERPL-MCNC: 0.7 MG/DL (ref 0.6–1.2)
EKG ATRIAL RATE: 52 BPM
EKG DIAGNOSIS: NORMAL
EKG P AXIS: 56 DEGREES
EKG P-R INTERVAL: 124 MS
EKG Q-T INTERVAL: 442 MS
EKG QRS DURATION: 88 MS
EKG QTC CALCULATION (BAZETT): 411 MS
EKG R AXIS: 52 DEGREES
EKG T AXIS: 88 DEGREES
EKG VENTRICULAR RATE: 52 BPM
EOSINOPHILS ABSOLUTE: 0.5 K/UL (ref 0–0.6)
EOSINOPHILS RELATIVE PERCENT: 7.4 %
GFR AFRICAN AMERICAN: >60
GFR NON-AFRICAN AMERICAN: >60
GLOBULIN: 2.9 G/DL
GLUCOSE BLD-MCNC: 108 MG/DL (ref 70–99)
GLUCOSE URINE: NEGATIVE MG/DL
HCT VFR BLD CALC: 35.6 % (ref 36–48)
HEMOGLOBIN: 11.7 G/DL (ref 12–16)
KETONES, URINE: NEGATIVE MG/DL
LEUKOCYTE ESTERASE, URINE: ABNORMAL
LYMPHOCYTES ABSOLUTE: 2 K/UL (ref 1–5.1)
LYMPHOCYTES RELATIVE PERCENT: 27.5 %
MCH RBC QN AUTO: 30 PG (ref 26–34)
MCHC RBC AUTO-ENTMCNC: 32.9 G/DL (ref 31–36)
MCV RBC AUTO: 91.2 FL (ref 80–100)
MICROSCOPIC EXAMINATION: YES
MONOCYTES ABSOLUTE: 0.4 K/UL (ref 0–1.3)
MONOCYTES RELATIVE PERCENT: 5.9 %
NEUTROPHILS ABSOLUTE: 4.3 K/UL (ref 1.7–7.7)
NEUTROPHILS RELATIVE PERCENT: 58.3 %
NITRITE, URINE: NEGATIVE
PDW BLD-RTO: 14 % (ref 12.4–15.4)
PH UA: 6.5 (ref 5–8)
PLATELET # BLD: 273 K/UL (ref 135–450)
PMV BLD AUTO: 8.4 FL (ref 5–10.5)
POTASSIUM REFLEX MAGNESIUM: 4.1 MMOL/L (ref 3.5–5.1)
PROTEIN UA: NEGATIVE MG/DL
RBC # BLD: 3.9 M/UL (ref 4–5.2)
RBC UA: ABNORMAL /HPF (ref 0–4)
SODIUM BLD-SCNC: 138 MMOL/L (ref 136–145)
SPECIFIC GRAVITY UA: 1.01 (ref 1–1.03)
TOTAL CK: 35 U/L (ref 26–192)
TOTAL PROTEIN: 6 G/DL (ref 6.4–8.2)
TROPONIN: 0.03 NG/ML
URINE TYPE: ABNORMAL
UROBILINOGEN, URINE: 0.2 E.U./DL
WBC # BLD: 7.3 K/UL (ref 4–11)
WBC UA: ABNORMAL /HPF (ref 0–5)

## 2021-06-10 PROCEDURE — 82550 ASSAY OF CK (CPK): CPT

## 2021-06-10 PROCEDURE — 80053 COMPREHEN METABOLIC PANEL: CPT

## 2021-06-10 PROCEDURE — 81001 URINALYSIS AUTO W/SCOPE: CPT

## 2021-06-10 PROCEDURE — 99285 EMERGENCY DEPT VISIT HI MDM: CPT

## 2021-06-10 PROCEDURE — 2580000003 HC RX 258: Performed by: PHYSICIAN ASSISTANT

## 2021-06-10 PROCEDURE — 87077 CULTURE AEROBIC IDENTIFY: CPT

## 2021-06-10 PROCEDURE — 6360000002 HC RX W HCPCS: Performed by: PHYSICIAN ASSISTANT

## 2021-06-10 PROCEDURE — 87086 URINE CULTURE/COLONY COUNT: CPT

## 2021-06-10 PROCEDURE — 87186 SC STD MICRODIL/AGAR DIL: CPT

## 2021-06-10 PROCEDURE — 84484 ASSAY OF TROPONIN QUANT: CPT

## 2021-06-10 PROCEDURE — 85025 COMPLETE CBC W/AUTO DIFF WBC: CPT

## 2021-06-10 PROCEDURE — 93010 ELECTROCARDIOGRAM REPORT: CPT | Performed by: INTERNAL MEDICINE

## 2021-06-10 PROCEDURE — 93005 ELECTROCARDIOGRAM TRACING: CPT | Performed by: PHYSICIAN ASSISTANT

## 2021-06-10 RX ORDER — ONDANSETRON 2 MG/ML
4 INJECTION INTRAMUSCULAR; INTRAVENOUS ONCE
Status: COMPLETED | OUTPATIENT
Start: 2021-06-10 | End: 2021-06-10

## 2021-06-10 RX ORDER — CEFDINIR 300 MG/1
300 CAPSULE ORAL 2 TIMES DAILY
Qty: 14 CAPSULE | Refills: 0 | Status: SHIPPED | OUTPATIENT
Start: 2021-06-10 | End: 2021-06-17

## 2021-06-10 RX ORDER — KETOROLAC TROMETHAMINE 30 MG/ML
15 INJECTION, SOLUTION INTRAMUSCULAR; INTRAVENOUS ONCE
Status: COMPLETED | OUTPATIENT
Start: 2021-06-10 | End: 2021-06-10

## 2021-06-10 RX ORDER — 0.9 % SODIUM CHLORIDE 0.9 %
1000 INTRAVENOUS SOLUTION INTRAVENOUS ONCE
Status: COMPLETED | OUTPATIENT
Start: 2021-06-10 | End: 2021-06-10

## 2021-06-10 RX ADMIN — KETOROLAC TROMETHAMINE 15 MG: 30 INJECTION, SOLUTION INTRAMUSCULAR at 11:22

## 2021-06-10 RX ADMIN — SODIUM CHLORIDE 1000 ML: 9 INJECTION, SOLUTION INTRAVENOUS at 11:22

## 2021-06-10 RX ADMIN — ONDANSETRON 4 MG: 2 INJECTION INTRAMUSCULAR; INTRAVENOUS at 11:22

## 2021-06-10 RX ADMIN — CEFTRIAXONE SODIUM 1000 MG: 1 INJECTION, POWDER, FOR SOLUTION INTRAMUSCULAR; INTRAVENOUS at 11:26

## 2021-06-10 ASSESSMENT — ENCOUNTER SYMPTOMS
BACK PAIN: 0
COLOR CHANGE: 0
COUGH: 0
SHORTNESS OF BREATH: 0
EYES NEGATIVE: 1
ABDOMINAL PAIN: 0
VOMITING: 0
NAUSEA: 1

## 2021-06-10 ASSESSMENT — PAIN SCALES - GENERAL
PAINLEVEL_OUTOF10: 10
PAINLEVEL_OUTOF10: 10

## 2021-06-10 ASSESSMENT — PAIN DESCRIPTION - PAIN TYPE: TYPE: ACUTE PAIN

## 2021-06-10 ASSESSMENT — PAIN DESCRIPTION - ORIENTATION: ORIENTATION: RIGHT;LEFT

## 2021-06-10 ASSESSMENT — PAIN DESCRIPTION - LOCATION: LOCATION: ARM;LEG

## 2021-06-10 NOTE — ED NOTES
Pt incontinent of urine. Perineal care provided and pull up changed.       Sandeep Dhillon RN  06/10/21 4003

## 2021-06-10 NOTE — ED NOTES

## 2021-06-10 NOTE — ED PROVIDER NOTES
[facility]  ED  EMERGENCY DEPARTMENT ENCOUNTER        Pt Name: Sarah Arreaga  MRN: [id number]  [place] 1935  Date of evaluation: 6/10/2021  Provider: Tomy Morrison PA-C  PCP: Jorje Arizmendi MD  ED Attending: [employer], MD      This patient was not seen by the attending provider      History provided by the patient    CHIEF COMPLAINT:     Chief Complaint   Patient presents with    Generalized Body Aches     Pt c/o pain all over. Pt lives with her son. HISTORY OF PRESENT ILLNESS:      Sarah Arreaga is a 80 y.o. female who arrives to the ED by [facility]. Patient comes from home. She lives with her son. I called the patient's son who is the one who called EMS. He reported the patient had been up most of the night crying out in pain. She reported diffuse body pain, particularly to her arms and legs. She also had some nausea and \"spitting up\". On arrival, patient reports diffuse body aches that started within the last few hours. There has been no injury or fall reported. Patient moves bilateral upper and lower extremities well. She states her arms and legs hurt in particular and she rates the pain 10/10. She denies any specific chest or abdominal pain. She denies coughing or shortness of breath. She denies recent illness. No identifiable exacerbating or alleviating factors to symptoms. At baseline patient is nonambulatory. She lives with her son. She has a hospital bed and a bedside commode. He assists with all her ADLs. Nursing Notes were reviewed     REVIEW OF SYSTEMS:     Review of Systems   Constitutional: Negative for chills and fever. HENT: Negative. Eyes: Negative. Respiratory: Negative for cough and shortness of breath. Cardiovascular: Negative for chest pain. Gastrointestinal: Positive for nausea. Negative for abdominal pain and vomiting. Genitourinary: Negative for difficulty urinating. Musculoskeletal: Positive for arthralgias and myalgias. Negative for back pain. Skin: Negative for color change. Neurological: Negative for headaches. All other systems reviewed and are negative. Except as noted above in the ROS, all other systems were reviewed and negative. PAST MEDICAL HISTORY:     Past Medical History:   Diagnosis Date    Acute bilateral low back pain with bilateral sciatica 6/8/2017    Acute kidney injury ([address]     Anxiety     Cancer ([address]     ovarian cancer    Degeneration of lumbar or lumbosacral intervertebral disc 5/7/2018    Depression     Gastroesophageal reflux disease without esophagitis     Hypertension     Hypothyroid 2/20/2017    Thyroid disease          SURGICAL HISTORY:      Past Surgical History:   Procedure Laterality Date    HYSTERECTOMY      THYROIDECTOMY           CURRENT MEDICATIONS:       Previous Medications    ASPIRIN 81 MG EC TABLET    Take 81 mg by mouth    HYDRALAZINE (APRESOLINE) 100 MG TABLET    Take 1 tablet by mouth 3 times daily as needed (SBP > 160 mmHg)    LEVOTHYROXINE (SYNTHROID) 25 MCG TABLET    Take 2 tablets by mouth Daily    LOSARTAN (COZAAR) 100 MG TABLET    TAKE 1 TABLET EVERY DAY    METOPROLOL SUCCINATE (TOPROL XL) 100 MG EXTENDED RELEASE TABLET    TAKE 1 TABLET EVERY DAY    NIFEDIPINE (ADALAT CC) 30 MG EXTENDED RELEASE TABLET    TAKE 1 TABLET BY MOUTH NIGHTLY    OMEPRAZOLE (PRILOSEC) 20 MG DELAYED RELEASE CAPSULE    TAKE 1 CAPSULE EVERY DAY    RISPERIDONE (RISPERDAL) 0.5 MG TABLET    Take 1 tablet by mouth 2 times daily    TRAMADOL (ULTRAM) 50 MG TABLET    Take 50 mg by mouth every 6 hours as needed for Pain. ALLERGIES:    Amlodipine besylate, Demerol hcl [meperidine], Hydrochlorothiazide, Lisinopril, and Meperidine hcl    FAMILY HISTORY:       Family History   Problem Relation Age of Onset    Cancer Brother           SOCIAL HISTORY:       Social History     Socioeconomic History    Marital status:       Spouse name: None    Number of children: None    Years of education: None    Highest education level: None   Occupational History    None   Tobacco Use    Smoking status: Former Smoker    Smokeless tobacco: Never Used   Vaping Use    Vaping Use: Never used   Substance and Sexual Activity    Alcohol use: No    Drug use: No    Sexual activity: None   Other Topics Concern    None   Social History Narrative    None     Social Determinants of Health     Financial Resource Strain:     Difficulty of Paying Living Expenses:    Food Insecurity:     Worried About Running Out of Food in the Last Year:     Ran Out of Food in the Last Year:    Transportation Needs:     Lack of Transportation (Medical):  Lack of Transportation (Non-Medical):    Physical Activity:     Days of Exercise per Week:     Minutes of Exercise per Session:    Stress:     Feeling of Stress :    Social Connections:     Frequency of Communication with Friends and Family:     Frequency of Social Gatherings with Friends and Family:     Attends Sabianism Services:     Active Member of Clubs or Organizations:     Attends Club or Organization Meetings:     Marital Status:    Intimate Partner Violence:     Fear of Current or Ex-Partner:     Emotionally Abused:     Physically Abused:     Sexually Abused:        SCREENINGS:             PHYSICAL EXAM:       ED Triage Vitals   BP Temp Temp Source Pulse Resp SpO2 Height Weight   06/10/21 0830 06/10/21 0830 06/10/21 0830 06/10/21 0830 06/10/21 0830 06/10/21 0830 06/10/21 0825 06/10/21 0825   (!) 164/72 98.3 °F (36.8 °C) Oral 58 16 99 % 5' 5.5\" (1.664 m) 145 lb (65.8 kg)       Physical Exam    CONSTITUTIONAL: Awake and alert. Cooperative. Well-developed. Well-nourished. Non-toxic. No acute distress. HENT: Normocephalic. Atraumatic. External ears normal, without discharge. No nasal discharge. Oropharynx clear. Mucous membranes moist.  EYES: Conjunctiva non-injected. No scleral icterus. PERRL. EOM's grossly intact. NECK: Supple. Normal ROM. CARDIOVASCULAR: RRR. No Murmer. Intact distal pulses. PULMONARY/CHEST WALL: Effort normal. No tachypnea. Lungs clear to ausculation. ABDOMEN: Normal BS. Soft. Nondistended. No tenderness to palpate. No guarding. /ANORECTAL: Not assessed  MUSKULOSKELETAL: Normal ROM. No acute deformities. No edema. No tenderness to palpate. SKIN: Warm and dry. No rash. NEUROLOGICAL: Alert and oriented x 3. GCS 15. CN II-XII grossly intact. Strength is 5/5 in all extremities and sensation is intact.   PSYCHIATRIC: Normal affect        DIAGNOSTICRESULTS:     LABS:    Results for orders placed or performed during the hospital encounter of 06/10/21   CBC Auto Differential   Result Value Ref Range    WBC 7.3 4.0 - 11.0 K/uL    RBC 3.90 (L) 4.00 - 5.20 M/uL    Hemoglobin 11.7 (L) 12.0 - 16.0 g/dL    Hematocrit 35.6 (L) 36.0 - 48.0 %    MCV 91.2 80.0 - 100.0 fL    MCH 30.0 26.0 - 34.0 pg    MCHC 32.9 31.0 - 36.0 g/dL    RDW 14.0 12.4 - 15.4 %    Platelets 295 135 - 903 K/uL    MPV 8.4 5.0 - 10.5 fL    Neutrophils % 58.3 %    Lymphocytes % 27.5 %    Monocytes % 5.9 %    Eosinophils % 7.4 %    Basophils % 0.9 %    Neutrophils Absolute 4.3 1.7 - 7.7 K/uL    Lymphocytes Absolute 2.0 1.0 - 5.1 K/uL    Monocytes Absolute 0.4 0.0 - 1.3 K/uL    Eosinophils Absolute 0.5 0.0 - 0.6 K/uL    Basophils Absolute 0.1 0.0 - 0.2 K/uL   Comprehensive Metabolic Panel w/ Reflex to MG   Result Value Ref Range    Sodium 138 136 - 145 mmol/L    Potassium reflex Magnesium 4.1 3.5 - 5.1 mmol/L    Chloride 104 99 - 110 mmol/L    CO2 26 21 - 32 mmol/L    Anion Gap 8 3 - 16    Glucose 108 (H) 70 - 99 mg/dL    BUN 30 (H) 7 - 20 mg/dL    CREATININE 0.7 0.6 - 1.2 mg/dL    GFR Non-African American >60 >60    GFR African American >60 >60    Calcium 9.6 8.3 - 10.6 mg/dL    Total Protein 6.0 (L) 6.4 - 8.2 g/dL    Albumin 3.1 (L) 3.4 - 5.0 g/dL    Albumin/Globulin Ratio 1.1 1.1 - 2.2    Total Bilirubin 0.4 0.0 - 1.0 mg/dL    Alkaline Phosphatase 74 40 - 129 U/L    ALT 7 (L) 10 - 40 U/L    AST 12 (L) 15 - 37 U/L    Globulin 2.9 g/dL   CK   Result Value Ref Range    Total CK 35 26 - 192 U/L   Urinalysis, reflex to microscopic   Result Value Ref Range    Color, UA Yellow Straw/Yellow    Clarity, UA Clear Clear    Glucose, Ur Negative Negative mg/dL    Bilirubin Urine Negative Negative    Ketones, Urine Negative Negative mg/dL    Specific Gravity, UA 1.015 1.005 - 1.030    Blood, Urine TRACE-INTACT (A) Negative    pH, UA 6.5 5.0 - 8.0    Protein, UA Negative Negative mg/dL    Urobilinogen, Urine 0.2 <2.0 E.U./dL    Nitrite, Urine Negative Negative    Leukocyte Esterase, Urine MODERATE (A) Negative    Microscopic Examination YES     Urine Type NotGiven    Microscopic Urinalysis   Result Value Ref Range    WBC, UA  (A) 0 - 5 /HPF    RBC, UA 3-4 0 - 4 /HPF    Bacteria, UA 3+ (A) None Seen /HPF   Troponin   Result Value Ref Range    Troponin 0.03 (H) <0.01 ng/mL   EKG 12 Lead   Result Value Ref Range    Ventricular Rate 52 BPM    Atrial Rate 52 BPM    P-R Interval 124 ms    QRS Duration 88 ms    Q-T Interval 442 ms    QTc Calculation (Bazett) 411 ms    P Axis 56 degrees    R Axis 52 degrees    T Axis 88 degrees    Diagnosis       Sinus bradycardiaNonspecific ST and T wave abnormalityAbnormal ECGWhen compared with ECG of 30-OCT-2020 12:22,Criteria for Septal infarct are no longer PresentNonspecific T wave abnormality now evident in Inferior leads         EKG: The Ekg interpreted by me in the absence of a cardiologist shows.     sinus bradycardia, rate=52     See also interpretation by Anastasia Burgess MD      PROCEDURES:   N/A    CRITICAL CARE TIME:     None    CONSULTS:  None      EMERGENCY DEPARTMENT COURSE and DIFFERENTIAL DIAGNOSIS/MDM:   Vitals:    Vitals:    06/10/21 0825 06/10/21 0830 06/10/21 0931 06/10/21 1041   BP:  (!) 164/72 (!) 180/62 (!) 162/67   Pulse:  58 51 53   Resp:  16 20 20   Temp:  98.3 °F (36.8 °C)     TempSrc:  Oral     SpO2:  99% 99% 98%   Weight: 145 lb (65.8 kg)      Height: 5' 5.5\" (1.664 m)          Patient was given the following medications:  Medications   0.9 % sodium chloride bolus (1,000 mLs Intravenous New Bag 6/10/21 1122)   cefTRIAXone (ROCEPHIN) 1000 mg IVPB in 50 mL D5W minibag (has no administration in time range)   ondansetron (ZOFRAN) injection 4 mg (4 mg Intravenous Given 6/10/21 1122)   ketorolac (TORADOL) injection 15 mg (15 mg Intravenous Given 6/10/21 1122)       I have evaluated this patient in the ED. Old records were reviewed. Patient brought in by EMS after complaining of body aches and pain. She was nauseous this morning and reportedly \"spit up\". She is no longer feeling nauseous. She denies chest pain or shortness of breath. Vital signs good on arrival.  Based on complaints and concerns I did speak on the phone with the patient's son and initiated a work-up  EKG shows sinus bradycardia with rate 52 bpm.  No acute ischemia  CBC normal with white count 7.3, H&H 11.7 and 35.6  CMP BUN is 30 and creatinine 0.7. Remaining values unremarkable  CK normal at 35  Troponin 0.03 (Patient's troponin is chronically elevated and looking back, and is 0.03 nearly all the time. She has no chest pain today and EKG is unremarkable). Urinalysis shows trace blood, moderate leukocytes with  WBCs and 3+ bacteria. Based on work-up patient is ordered 1 L normal saline IV with Zofran 4 mg IV, Toradol 15 mg IV and Rocephin 1 g IV. I again spoke on the phone with the patient's son and reviewed results. Patient is feeling better and is felt to be stable for discharge. He feels comfortable taking her home. She will be placed on cefdinir as an outpatient and should follow-up with primary care next week. If she experiences any worsening symptoms such as fevers, worsening pain, vomiting or other concerns she should be brought back to the ED for evaluation.     I estimate there is LOW risk for SEPSIS, ACUTE ABDOMEN, ACUTE CORONARY SYNDROME, MALIGNANT DYSRHYTHMIA or HYPERTENSION, PULMONARY EMBOLISM, THORACIC AORTIC DISSECTION, INTRACRANIAL HEMORRHAGE, SUBARACHNOID HEMORRHAGE, SUBDURAL HEMATOMA or STROKE, thus I consider the discharge disposition reasonable. Geovanna Lester and I have discussed the diagnosis and risks, and we agree with discharging home to follow-up with their primary doctor. We also discussed returning to the Emergency Department immediately if new or worsening symptoms occur. We have discussed the symptoms which are most concerning (e.g., fever, bloody sputum, worsening pain or shortness of breath, weakness, persistent vomiting) that necessitate immediate return. FINAL IMPRESSION:      1. Urinary tract infection without hematuria, site unspecified    2.  Body aches          DISPOSITION/PLAN:   DISPOSITION     DISCHARGE    PATIENT REFERRED TO:  Anita Moncada MD  [address]  [phone]    Schedule an appointment as soon as possible for a visit in 1 week        DISCHARGE MEDICATIONS:  New Prescriptions    CEFDINIR (OMNICEF) 300 MG CAPSULE    Take 1 capsule by mouth 2 times daily for 7 days                  (Please note thatportions of this note were completed with a voice recognition program.  Efforts were made to edit the dictations, but occasionally words are mis-transcribed.)    Beatriz Naik PA-C (electronicallysigned)              Spencer Ahumada, [place]  06/10/21 1121

## 2021-06-10 NOTE — ED NOTES
Bed: 23  Expected date: 6/10/21  Expected time: 8:09 AM  Means of arrival: [facility]  Comments:  Collette Blander, RN  06/10/21 2920

## 2021-06-10 NOTE — ED NOTES
Straight cathed pt per MD order, using sterile technique. Pt tolerated well.  Obtained 30ml yari colored urine, labeled & sent urine to lab     [address] [facility]  06/10/21 9661

## 2021-06-12 LAB
ORGANISM: ABNORMAL
URINE CULTURE, ROUTINE: ABNORMAL

## 2021-10-21 ENCOUNTER — HOSPITAL ENCOUNTER (INPATIENT)
Age: 86
LOS: 5 days | Discharge: HOME | DRG: 689 | End: 2021-10-26
Attending: EMERGENCY MEDICINE | Admitting: INTERNAL MEDICINE
Payer: MEDICARE

## 2021-10-21 ENCOUNTER — APPOINTMENT (OUTPATIENT)
Dept: CT IMAGING | Age: 86
DRG: 689 | End: 2021-10-21
Payer: MEDICARE

## 2021-10-21 ENCOUNTER — APPOINTMENT (OUTPATIENT)
Dept: GENERAL RADIOLOGY | Age: 86
DRG: 689 | End: 2021-10-21
Payer: MEDICARE

## 2021-10-21 DIAGNOSIS — N30.00 ACUTE CYSTITIS WITHOUT HEMATURIA: ICD-10-CM

## 2021-10-21 DIAGNOSIS — G93.41 METABOLIC ENCEPHALOPATHY: Primary | ICD-10-CM

## 2021-10-21 PROBLEM — N10 ACUTE PYELONEPHRITIS: Status: ACTIVE | Noted: 2021-10-21

## 2021-10-21 LAB
A/G RATIO: 1.4 (ref 1.1–2.2)
ALBUMIN SERPL-MCNC: 3.7 G/DL (ref 3.4–5)
ALP BLD-CCNC: 83 U/L (ref 40–129)
ALT SERPL-CCNC: 7 U/L (ref 10–40)
ANION GAP SERPL CALCULATED.3IONS-SCNC: 17 MMOL/L (ref 3–16)
AST SERPL-CCNC: 13 U/L (ref 15–37)
BACTERIA: ABNORMAL /HPF
BASOPHILS ABSOLUTE: 0.1 K/UL (ref 0–0.2)
BASOPHILS RELATIVE PERCENT: 1.1 %
BILIRUB SERPL-MCNC: 0.8 MG/DL (ref 0–1)
BILIRUBIN URINE: ABNORMAL
BLOOD, URINE: ABNORMAL
BUN BLDV-MCNC: 18 MG/DL (ref 7–20)
CALCIUM SERPL-MCNC: 10 MG/DL (ref 8.3–10.6)
CHLORIDE BLD-SCNC: 101 MMOL/L (ref 99–110)
CLARITY: ABNORMAL
CO2: 21 MMOL/L (ref 21–32)
COLOR: YELLOW
CREAT SERPL-MCNC: 0.8 MG/DL (ref 0.6–1.2)
EOSINOPHILS ABSOLUTE: 0.4 K/UL (ref 0–0.6)
EOSINOPHILS RELATIVE PERCENT: 4.9 %
EPITHELIAL CELLS, UA: ABNORMAL /HPF (ref 0–5)
GFR AFRICAN AMERICAN: >60
GFR NON-AFRICAN AMERICAN: >60
GLOBULIN: 2.7 G/DL
GLUCOSE BLD-MCNC: 106 MG/DL (ref 70–99)
GLUCOSE URINE: NEGATIVE MG/DL
HCT VFR BLD CALC: 39.9 % (ref 36–48)
HEMOGLOBIN: 13.4 G/DL (ref 12–16)
KETONES, URINE: NEGATIVE MG/DL
LEUKOCYTE ESTERASE, URINE: ABNORMAL
LYMPHOCYTES ABSOLUTE: 3.4 K/UL (ref 1–5.1)
LYMPHOCYTES RELATIVE PERCENT: 39.5 %
MCH RBC QN AUTO: 30.2 PG (ref 26–34)
MCHC RBC AUTO-ENTMCNC: 33.5 G/DL (ref 31–36)
MCV RBC AUTO: 90.1 FL (ref 80–100)
MICROSCOPIC EXAMINATION: YES
MONOCYTES ABSOLUTE: 0.5 K/UL (ref 0–1.3)
MONOCYTES RELATIVE PERCENT: 5.4 %
NEUTROPHILS ABSOLUTE: 4.3 K/UL (ref 1.7–7.7)
NEUTROPHILS RELATIVE PERCENT: 49.1 %
NITRITE, URINE: NEGATIVE
PDW BLD-RTO: 14.4 % (ref 12.4–15.4)
PH UA: 6 (ref 5–8)
PLATELET # BLD: 272 K/UL (ref 135–450)
PMV BLD AUTO: 8.5 FL (ref 5–10.5)
POTASSIUM REFLEX MAGNESIUM: 3.8 MMOL/L (ref 3.5–5.1)
PROTEIN UA: NEGATIVE MG/DL
RBC # BLD: 4.43 M/UL (ref 4–5.2)
RBC UA: ABNORMAL /HPF (ref 0–4)
SODIUM BLD-SCNC: 139 MMOL/L (ref 136–145)
SPECIFIC GRAVITY UA: 1.02 (ref 1–1.03)
TOTAL PROTEIN: 6.4 G/DL (ref 6.4–8.2)
TROPONIN: 0.04 NG/ML
URINE REFLEX TO CULTURE: YES
URINE TYPE: ABNORMAL
UROBILINOGEN, URINE: 1 E.U./DL
WBC # BLD: 8.7 K/UL (ref 4–11)
WBC UA: ABNORMAL /HPF (ref 0–5)

## 2021-10-21 PROCEDURE — 80053 COMPREHEN METABOLIC PANEL: CPT

## 2021-10-21 PROCEDURE — 84484 ASSAY OF TROPONIN QUANT: CPT

## 2021-10-21 PROCEDURE — 87077 CULTURE AEROBIC IDENTIFY: CPT

## 2021-10-21 PROCEDURE — 84439 ASSAY OF FREE THYROXINE: CPT

## 2021-10-21 PROCEDURE — 87086 URINE CULTURE/COLONY COUNT: CPT

## 2021-10-21 PROCEDURE — 87186 SC STD MICRODIL/AGAR DIL: CPT

## 2021-10-21 PROCEDURE — 6360000002 HC RX W HCPCS: Performed by: EMERGENCY MEDICINE

## 2021-10-21 PROCEDURE — 70450 CT HEAD/BRAIN W/O DYE: CPT

## 2021-10-21 PROCEDURE — 96365 THER/PROPH/DIAG IV INF INIT: CPT

## 2021-10-21 PROCEDURE — 93005 ELECTROCARDIOGRAM TRACING: CPT | Performed by: EMERGENCY MEDICINE

## 2021-10-21 PROCEDURE — 36415 COLL VENOUS BLD VENIPUNCTURE: CPT

## 2021-10-21 PROCEDURE — 1200000000 HC SEMI PRIVATE

## 2021-10-21 PROCEDURE — 81001 URINALYSIS AUTO W/SCOPE: CPT

## 2021-10-21 PROCEDURE — 99284 EMERGENCY DEPT VISIT MOD MDM: CPT

## 2021-10-21 PROCEDURE — 85025 COMPLETE CBC W/AUTO DIFF WBC: CPT

## 2021-10-21 PROCEDURE — 2580000003 HC RX 258: Performed by: EMERGENCY MEDICINE

## 2021-10-21 PROCEDURE — 84443 ASSAY THYROID STIM HORMONE: CPT

## 2021-10-21 PROCEDURE — 71045 X-RAY EXAM CHEST 1 VIEW: CPT

## 2021-10-21 PROCEDURE — 51701 INSERT BLADDER CATHETER: CPT

## 2021-10-21 RX ORDER — ONDANSETRON 2 MG/ML
4 INJECTION INTRAMUSCULAR; INTRAVENOUS EVERY 6 HOURS PRN
Status: DISCONTINUED | OUTPATIENT
Start: 2021-10-21 | End: 2021-10-26 | Stop reason: HOSPADM

## 2021-10-21 RX ORDER — SODIUM CHLORIDE 0.9 % (FLUSH) 0.9 %
10 SYRINGE (ML) INJECTION PRN
Status: DISCONTINUED | OUTPATIENT
Start: 2021-10-21 | End: 2021-10-26 | Stop reason: HOSPADM

## 2021-10-21 RX ORDER — POTASSIUM CHLORIDE 7.45 MG/ML
10 INJECTION INTRAVENOUS PRN
Status: DISCONTINUED | OUTPATIENT
Start: 2021-10-21 | End: 2021-10-26 | Stop reason: HOSPADM

## 2021-10-21 RX ORDER — PROMETHAZINE HYDROCHLORIDE 25 MG/1
12.5 TABLET ORAL EVERY 6 HOURS PRN
Status: DISCONTINUED | OUTPATIENT
Start: 2021-10-21 | End: 2021-10-26 | Stop reason: HOSPADM

## 2021-10-21 RX ORDER — SODIUM CHLORIDE 9 MG/ML
25 INJECTION, SOLUTION INTRAVENOUS PRN
Status: DISCONTINUED | OUTPATIENT
Start: 2021-10-21 | End: 2021-10-26 | Stop reason: HOSPADM

## 2021-10-21 RX ORDER — SODIUM CHLORIDE 0.9 % (FLUSH) 0.9 %
10 SYRINGE (ML) INJECTION EVERY 12 HOURS SCHEDULED
Status: DISCONTINUED | OUTPATIENT
Start: 2021-10-21 | End: 2021-10-26 | Stop reason: HOSPADM

## 2021-10-21 RX ORDER — ACETAMINOPHEN 325 MG/1
650 TABLET ORAL EVERY 6 HOURS PRN
Status: DISCONTINUED | OUTPATIENT
Start: 2021-10-21 | End: 2021-10-26 | Stop reason: HOSPADM

## 2021-10-21 RX ORDER — ACETAMINOPHEN 650 MG/1
650 SUPPOSITORY RECTAL EVERY 6 HOURS PRN
Status: DISCONTINUED | OUTPATIENT
Start: 2021-10-21 | End: 2021-10-26 | Stop reason: HOSPADM

## 2021-10-21 RX ORDER — MAGNESIUM SULFATE IN WATER 40 MG/ML
2000 INJECTION, SOLUTION INTRAVENOUS PRN
Status: DISCONTINUED | OUTPATIENT
Start: 2021-10-21 | End: 2021-10-26 | Stop reason: HOSPADM

## 2021-10-21 RX ADMIN — CEFTRIAXONE SODIUM 1000 MG: 1 INJECTION, POWDER, FOR SOLUTION INTRAMUSCULAR; INTRAVENOUS at 21:58

## 2021-10-21 NOTE — Clinical Note
Patient Class: Inpatient [101]   REQUIRED: Diagnosis: Acute pyelonephritis [139139]   Estimated Length of Stay: Estimated stay of more than 2 midnights   Admitting Provider: Godfrey Gomes [[id number]]   Telemetry/Cardiac Monitoring Required?: Yes

## 2021-10-22 LAB
A/G RATIO: 1.2 (ref 1.1–2.2)
ALBUMIN SERPL-MCNC: 3.3 G/DL (ref 3.4–5)
ALP BLD-CCNC: 76 U/L (ref 40–129)
ALT SERPL-CCNC: 6 U/L (ref 10–40)
AMMONIA: <10 UMOL/L (ref 11–51)
ANION GAP SERPL CALCULATED.3IONS-SCNC: 16 MMOL/L (ref 3–16)
AST SERPL-CCNC: 11 U/L (ref 15–37)
BASOPHILS ABSOLUTE: 0.1 K/UL (ref 0–0.2)
BASOPHILS RELATIVE PERCENT: 0.8 %
BILIRUB SERPL-MCNC: 0.5 MG/DL (ref 0–1)
BUN BLDV-MCNC: 21 MG/DL (ref 7–20)
CALCIUM SERPL-MCNC: 9.6 MG/DL (ref 8.3–10.6)
CHLORIDE BLD-SCNC: 102 MMOL/L (ref 99–110)
CO2: 22 MMOL/L (ref 21–32)
CREAT SERPL-MCNC: 0.7 MG/DL (ref 0.6–1.2)
EKG ATRIAL RATE: 71 BPM
EKG DIAGNOSIS: NORMAL
EKG P-R INTERVAL: 168 MS
EKG Q-T INTERVAL: 414 MS
EKG QRS DURATION: 90 MS
EKG QTC CALCULATION (BAZETT): 449 MS
EKG R AXIS: 49 DEGREES
EKG T AXIS: 74 DEGREES
EKG VENTRICULAR RATE: 71 BPM
EOSINOPHILS ABSOLUTE: 0.3 K/UL (ref 0–0.6)
EOSINOPHILS RELATIVE PERCENT: 3.9 %
GFR AFRICAN AMERICAN: >60
GFR NON-AFRICAN AMERICAN: >60
GLOBULIN: 2.8 G/DL
GLUCOSE BLD-MCNC: 99 MG/DL (ref 70–99)
HCT VFR BLD CALC: 37 % (ref 36–48)
HEMOGLOBIN: 12.2 G/DL (ref 12–16)
LYMPHOCYTES ABSOLUTE: 1.7 K/UL (ref 1–5.1)
LYMPHOCYTES RELATIVE PERCENT: 24.2 %
MCH RBC QN AUTO: 30.3 PG (ref 26–34)
MCHC RBC AUTO-ENTMCNC: 33 G/DL (ref 31–36)
MCV RBC AUTO: 91.7 FL (ref 80–100)
MONOCYTES ABSOLUTE: 0.5 K/UL (ref 0–1.3)
MONOCYTES RELATIVE PERCENT: 6.4 %
NEUTROPHILS ABSOLUTE: 4.6 K/UL (ref 1.7–7.7)
NEUTROPHILS RELATIVE PERCENT: 64.7 %
PDW BLD-RTO: 14.5 % (ref 12.4–15.4)
PLATELET # BLD: 274 K/UL (ref 135–450)
PMV BLD AUTO: 8 FL (ref 5–10.5)
POTASSIUM REFLEX MAGNESIUM: 3.9 MMOL/L (ref 3.5–5.1)
RBC # BLD: 4.03 M/UL (ref 4–5.2)
SODIUM BLD-SCNC: 140 MMOL/L (ref 136–145)
T4 FREE: 1.4 NG/DL (ref 0.9–1.8)
TOTAL PROTEIN: 6.1 G/DL (ref 6.4–8.2)
TROPONIN: 0.04 NG/ML
TROPONIN: 0.05 NG/ML
TROPONIN: 0.05 NG/ML
TSH REFLEX: 5.06 UIU/ML (ref 0.27–4.2)
WBC # BLD: 7.1 K/UL (ref 4–11)

## 2021-10-22 PROCEDURE — G0378 HOSPITAL OBSERVATION PER HR: HCPCS

## 2021-10-22 PROCEDURE — 85025 COMPLETE CBC W/AUTO DIFF WBC: CPT

## 2021-10-22 PROCEDURE — 93010 ELECTROCARDIOGRAM REPORT: CPT | Performed by: INTERNAL MEDICINE

## 2021-10-22 PROCEDURE — 96372 THER/PROPH/DIAG INJ SC/IM: CPT

## 2021-10-22 PROCEDURE — 2500000003 HC RX 250 WO HCPCS: Performed by: INTERNAL MEDICINE

## 2021-10-22 PROCEDURE — 82140 ASSAY OF AMMONIA: CPT

## 2021-10-22 PROCEDURE — 96366 THER/PROPH/DIAG IV INF ADDON: CPT

## 2021-10-22 PROCEDURE — 6360000002 HC RX W HCPCS: Performed by: INTERNAL MEDICINE

## 2021-10-22 PROCEDURE — 6370000000 HC RX 637 (ALT 250 FOR IP): Performed by: INTERNAL MEDICINE

## 2021-10-22 PROCEDURE — 80053 COMPREHEN METABOLIC PANEL: CPT

## 2021-10-22 PROCEDURE — 2580000003 HC RX 258: Performed by: INTERNAL MEDICINE

## 2021-10-22 PROCEDURE — 84484 ASSAY OF TROPONIN QUANT: CPT

## 2021-10-22 PROCEDURE — 96375 TX/PRO/DX INJ NEW DRUG ADDON: CPT

## 2021-10-22 PROCEDURE — 1200000000 HC SEMI PRIVATE

## 2021-10-22 PROCEDURE — 36415 COLL VENOUS BLD VENIPUNCTURE: CPT

## 2021-10-22 RX ORDER — HYDRALAZINE HYDROCHLORIDE 50 MG/1
100 TABLET, FILM COATED ORAL 3 TIMES DAILY PRN
Status: DISCONTINUED | OUTPATIENT
Start: 2021-10-22 | End: 2021-10-26 | Stop reason: HOSPADM

## 2021-10-22 RX ORDER — LABETALOL HYDROCHLORIDE 5 MG/ML
10 INJECTION, SOLUTION INTRAVENOUS EVERY 4 HOURS PRN
Status: DISCONTINUED | OUTPATIENT
Start: 2021-10-22 | End: 2021-10-26 | Stop reason: HOSPADM

## 2021-10-22 RX ORDER — NIFEDIPINE 30 MG/1
30 TABLET, EXTENDED RELEASE ORAL DAILY
Status: DISCONTINUED | OUTPATIENT
Start: 2021-10-22 | End: 2021-10-26 | Stop reason: HOSPADM

## 2021-10-22 RX ORDER — ASPIRIN 81 MG/1
81 TABLET ORAL DAILY
Status: DISCONTINUED | OUTPATIENT
Start: 2021-10-22 | End: 2021-10-26 | Stop reason: HOSPADM

## 2021-10-22 RX ORDER — LEVOTHYROXINE SODIUM 0.05 MG/1
50 TABLET ORAL DAILY
Status: DISCONTINUED | OUTPATIENT
Start: 2021-10-22 | End: 2021-10-26 | Stop reason: HOSPADM

## 2021-10-22 RX ORDER — TRAMADOL HYDROCHLORIDE 50 MG/1
50 TABLET ORAL EVERY 6 HOURS PRN
Status: DISCONTINUED | OUTPATIENT
Start: 2021-10-22 | End: 2021-10-26 | Stop reason: HOSPADM

## 2021-10-22 RX ORDER — PANTOPRAZOLE SODIUM 40 MG/1
40 TABLET, DELAYED RELEASE ORAL
Status: DISCONTINUED | OUTPATIENT
Start: 2021-10-22 | End: 2021-10-26 | Stop reason: HOSPADM

## 2021-10-22 RX ORDER — LOSARTAN POTASSIUM 100 MG/1
100 TABLET ORAL DAILY
Status: DISCONTINUED | OUTPATIENT
Start: 2021-10-22 | End: 2021-10-26 | Stop reason: HOSPADM

## 2021-10-22 RX ADMIN — NIFEDIPINE 30 MG: 30 TABLET, FILM COATED, EXTENDED RELEASE ORAL at 10:03

## 2021-10-22 RX ADMIN — LEVOTHYROXINE SODIUM 50 MCG: 0.05 TABLET ORAL at 10:03

## 2021-10-22 RX ADMIN — SODIUM CHLORIDE, PRESERVATIVE FREE 10 ML: 5 INJECTION INTRAVENOUS at 20:46

## 2021-10-22 RX ADMIN — LABETALOL HYDROCHLORIDE 10 MG: 5 INJECTION INTRAVENOUS at 04:58

## 2021-10-22 RX ADMIN — ENOXAPARIN SODIUM 30 MG: 30 INJECTION SUBCUTANEOUS at 10:06

## 2021-10-22 RX ADMIN — LOSARTAN POTASSIUM 100 MG: 100 TABLET, FILM COATED ORAL at 10:03

## 2021-10-22 RX ADMIN — CEFTRIAXONE SODIUM 1000 MG: 1 INJECTION, POWDER, FOR SOLUTION INTRAMUSCULAR; INTRAVENOUS at 20:46

## 2021-10-22 RX ADMIN — SODIUM CHLORIDE, PRESERVATIVE FREE 10 ML: 5 INJECTION INTRAVENOUS at 10:06

## 2021-10-22 RX ADMIN — ASPIRIN 81 MG: 81 TABLET, COATED ORAL at 10:03

## 2021-10-22 RX ADMIN — SODIUM CHLORIDE, PRESERVATIVE FREE 10 ML: 5 INJECTION INTRAVENOUS at 03:24

## 2021-10-22 RX ADMIN — PANTOPRAZOLE SODIUM 40 MG: 40 TABLET, DELAYED RELEASE ORAL at 10:03

## 2021-10-22 ASSESSMENT — PAIN SCALES - GENERAL
PAINLEVEL_OUTOF10: 0

## 2021-10-22 NOTE — CONSULTS
Comprehensive Nutrition Assessment    Type and Reason for Visit:  Initial, Consult, Positive Nutrition Screen (MST=2: weight loss, decreased appetite)    Nutrition Recommendations/Plan:   1. Continue easy to chew diet   2. Add Ensure with meals - monitor acceptance   3. Encourage PO intakes   4. Assist with meals as needed   5. Monitor nutrition adequacy, pertinent labs, bowel habits, wt changes, and clinical progress    Nutrition Assessment:  79 yo female admitted with acute encephalopathy. Hx of ovarian cancer, GERD, HTN and dementia. Palliative care consulted. Consulted for poor appetite/PO intakes x5 or more days. No family present at time of visit, pt very [place]. Pt nutritionally compromised AEB nutrition focused physical exam. Diet advanced to easy to chew today. Pt states she is hungry at time of visit. May require assistance with meals. Will add ONS to promote adequate nutrition thsi admission. Will monitor. Malnutrition Assessment:  Malnutrition Status:  Insufficient data    Context:  Chronic Illness     Findings of the 6 clinical characteristics of malnutrition:  Muscle Mass Loss:  7 - Severe muscle mass loss Clavicles (pectoralis & deltoids), Hand (interosseous)    Estimated Daily Nutrient Needs:  Energy (kcal):  3402-9033 kcal; Weight Used for Energy Requirements:  Ideal (58 kg)     Protein (g):  58-70 g; Weight Used for Protein Requirements:  Ideal (1.0-1.2 g/kg)        Fluid (ml/day):   ; Method Used for Fluid Requirements:  1 ml/kcal      Nutrition Related Findings:  Appears frail. +2 pitting BLE edema. [place]. +Dentures.       Wounds:  None       Current Nutrition Therapies:    ADULT DIET; Easy to Chew    Anthropometric Measures:  · Height: 5' 5.5\" (166.4 cm)  · Current Body Weight: 100 lb (45.4 kg)   · Usual Body Weight:  (145 lb stated weight hx)     · Ideal Body Weight: 128 lbs; % Ideal Body Weight 78.1 %   · BMI: 16.4  · BMI Categories: Underweight (BMI less than 18.5)       Nutrition Diagnosis: · Predicted inadequate energy intake related to inadequate protein-energy intake as evidenced by BMI, severe muscle loss      Nutrition Interventions:   Food and/or Nutrient Delivery:  Continue Current Diet, Start Oral Nutrition Supplement  Nutrition Education/Counseling:  Education not indicated   Coordination of Nutrition Care:  Continue to monitor while inpatient    Goals:  Pt will consume and tolerate 50% or greater of meals and ONS this admission       Nutrition Monitoring and Evaluation:   Behavioral-Environmental Outcomes:  None Identified   Food/Nutrient Intake Outcomes:  Food and Nutrient Intake, Supplement Intake, Diet Advancement/Tolerance  Physical Signs/Symptoms Outcomes:  Nutrition Focused Physical Findings, Weight     Discharge Planning:    Continue current diet, Continue Oral Nutrition Supplement     Electronically signed by Damien Gomes MS, RD, LD on 10/22/21 at 11:21 AM EDT    Contact: [phone]

## 2021-10-22 NOTE — PLAN OF CARE
Problem: Nutrition  Goal: Optimal nutrition therapy  Outcome: Ongoing  Note: Nutrition Problem #1: Predicted inadequate energy intake  Intervention: Food and/or Nutrient Delivery: Continue Current Diet, Start Oral Nutrition Supplement  Nutritional Goals: Pt will consume and tolerate 50% or greater of meals and ONS this admission

## 2021-10-22 NOTE — PROGRESS NOTES
Hospitalist Progress Note      PCP: Jignesh Matthew MD    Date of Admission: 10/21/2021    Chief Complaint on Admission:    Urinary Tract Infection       family wanted evaluated, dementia. History Of Present Illness:       80 y.o. female who presented to [facility] with past medical hypothyroidism, hypertension, GERD, depression, ovarian cancer, Alzheimer's dementia presented to the ED for acute encephalopathy.     Unable to obtain history as patient is encephalopathic. Per report patient has been having worsening confusion poor oral intake and the family was concerned about UTI as patient does have history of this before. No reported fever chills nausea vomiting chest pain or shortness of or abdominal pain. Pt Seen/Examined and Chart Reviewed. Admitting dx: Acute metabolic encephalopathy secondary to UTI    SUBJECTIVE:   Patient states feeling better this AM.  Tolerating all medication therapies.   Offering no new medical complaints    Bedside rounding with nursing completed    OBJECTIVE:     Allergies  Amlodipine besylate, Demerol hcl [meperidine], Hydrochlorothiazide, Lisinopril, and Meperidine hcl    Medications      Scheduled Meds:   aspirin  81 mg Oral Daily    levothyroxine  50 mcg Oral Daily    losartan  100 mg Oral Daily    NIFEdipine  30 mg Oral Daily    pantoprazole  40 mg Oral QAM AC    enoxaparin  30 mg SubCUTAneous Daily    sodium chloride flush  10 mL IntraVENous 2 times per day    cefTRIAXone (ROCEPHIN) IV  1,000 mg IntraVENous Q24H       Infusions:   sodium chloride         PRN Meds:  labetalol, hydrALAZINE, traMADol, sodium chloride flush, sodium chloride, potassium chloride, magnesium sulfate, promethazine **OR** ondansetron, acetaminophen **OR** acetaminophen    Intake and Output     Intake/Output Summary (Last 24 hours) at 10/22/2021 0928  Last data filed at 10/22/2021 0420  Gross per 24 hour   Intake 60 ml   Output 0 ml   Net 60 ml       Vitals    BP (!) 177/81 Pulse 72   Temp 97.8 °F (36.6 °C) (Oral)   Resp 14   Ht 5' 5.5\" (1.664 m)   Wt 100 lb (45.4 kg)   SpO2 96%   BMI 16.39 kg/m²     Exam:  General appearance:  pleasant elderly female, appears older than stated age  [de-identified]:   atraumatic, sclera anicteric, Conjunctivae clear. Neck: Supple,Trachea midline, no goiter  Respiratory:minimal accessory muscle usage, Normal respiratory effort. Clear to auscultation, bilaterally  Cardiovascular:  Regular rate and rhythm, capillary refill 2 seconds  Abdomen: Soft, tender, non-distended with normal bowel sounds. No Rebound sign, no guarding  Musculoskeletal:  No clubbing, cyanosis. Skin: turgor normal.  No new rashes or lesions. Neurologic: Alert and oriented x1, hard of hearing. Grossly non focal.  Moving PATEL equal bilat. Data    Recent Labs     10/21/21  2104 10/22/21  0708   WBC 8.7 7.1   HGB 13.4 12.2   HCT 39.9 37.0    274      Recent Labs     10/21/21  2104 10/22/21  0708    140   K 3.8 3.9    102   CO2 21 22   BUN 18 21*   CREATININE 0.8 0.7     Recent Labs     10/21/21  2104 10/22/21  0708   AST 13* 11*   ALT 7* 6*   BILITOT 0.8 0.5   ALKPHOS 83 76     No results for input(s): INR in the last 72 hours. Recent Labs     10/21/21  2104 10/22/21  0746   TROPONINI 0.04* 0.05*       Consults:     IP CONSULT TO HOSPITALIST  IP CONSULT TO PALLIATIVE CARE  IP CONSULT TO DIETITIAN    ASSESSMENT AND PLAN      Active Hospital Problems    Diagnosis Date Noted    Acute pyelonephritis [N10] 10/21/2021     Acute metabolic encephalopathy:  -Suspected secondary to UTI, with underlying baseline Alzheimer dementia  - CT head negative  - Labs: CBC, BMP, TSH  - Neurochecks  -Treat acute cystitis as outlined     Acute cystitis:  -Urine culture pending  -Abx:  ceftriaxone   - UA reviewed: WBC , +4 bacterial      Elevated troponin  - PersistentTroponin elevated in the past.  Baseline 0.03-0.05.  Will continue to trend  -Likely exacerbated by poorly controlled hypertensive heart disease.   -Patient is asymptomatic. No anginal equivalents  -EKG/Tele monitoring    Malnutrition: BMI 16.39  - Dietitian consulted, recommendations much appreciated  -Supplements to tray as recommended     Hypothyroidism:   -Stable without secondary symtpoms  -Meds: Levothyroxine  -Optimize Thyroid function as outpt with PCP.     Essential Hypertension in the setting of known CAD  -Stable and well controlled  -Meds: ASA, Cozaar, Procardia  -Hydralazine 100 mg po TID prn SBP > 160 mmHg as per home dosing  -Cont to optimize BP as outpt with PCP    GERD:   -Stable without red alarm symptoms  -Meds: Protonix       Diet: Dental soft   DVT Prophylaxis: lovenox     Dispo:   Expect 1-2 days pending clinical response to medical therapy.           Stef Schultz MD

## 2021-10-22 NOTE — ED NOTES
Straight cath for urine sample. 200ml clear yellow urine return.      Josette Martinez, RN  10/21/21 6156

## 2021-10-22 NOTE — PROGRESS NOTES
4 Eyes Skin Assessment     The patient is being assess for  Admission    I agree that 2 RN's have performed a thorough Head to Toe Skin Assessment on the patient. ALL assessment sites listed below have been assessed. Areas assessed by both nurses: Richarda Model  [x]   Head, Face, and Ears   [x]   Shoulders, Back, and Chest  [x]   Arms, Elbows, and Hands   [x]   Coccyx, Sacrum, and Ischum  [x]   Legs, Feet, and Heels        Does the Patient have Skin Breakdown?   No         Jacques Prevention initiated:  Yes   Wound Care Orders initiated:  No      [facility] nurse consulted for Pressure Injury (Stage 3,4, Unstageable, DTI, NWPT, and Complex wounds):  No      Nurse 1 eSignature: Electronically signed by Benito Choi RN on 10/22/21 at 6:54 AM EDT    **SHARE this note so that the co-signing nurse is able to place an eSignature**    Nurse 2 eSignature: Electronically signed by Geovanna Frazier RN on 10/22/21 at 6:55 AM EDT

## 2021-10-22 NOTE — ED NOTES
Bedside report given to Russ Gray RN. Pt left ED via stretcher at this time in stable condition. All belongings sent with pt. Care transferred.      Celeste Covington RN  10/22/21 9705

## 2021-10-22 NOTE — ED PROVIDER NOTES
[address] [facility]  ED  EMERGENCY DEPARTMENT ENCOUNTER      Pt Name: Shellie Barnes  MRN: [id number]  [place] 1935  Date of evaluation: 10/21/2021  Provider: Case Feliciano MD    CHIEF COMPLAINT       Chief Complaint   Patient presents with    Urinary Tract Infection     family wanted evaluated, dementia. HISTORY OF PRESENT ILLNESS   (Location/Symptom, Timing/Onset, Context/Setting, Quality, Duration, Modifying Factors, Severity)  Note limiting factors. Shellie Barnes is a 80 y.o. female with past medical history of hypertension, Alzheimer's disease here today with confusion    Patient presents to the emergency department today at request of her family for worsening confusion, poor oral intake and concern for underlying urinary tract infection. No report of fever, vomiting or diarrhea. No reports of chest pain, cough or shortness of breath. No abdominal pain. Due to her underlying dementia and current mental status she provides no current insight to her current clinical condition. [facility]    Nursing Notes were reviewed. REVIEW OF SYSTEMS    (2-9 systems for level 4, 10 or more for level 5)     Review of Systems    Please see HPI for pertinent positive and negative review of system findings. A full 10 system ROS was performed and otherwise negative.         PAST MEDICAL HISTORY     Past Medical History:   Diagnosis Date    Acute bilateral low back pain with bilateral sciatica 6/8/2017    Acute kidney injury ([address]     Anxiety     Cancer (HCC)     ovarian cancer    Degeneration of lumbar or lumbosacral intervertebral disc 5/7/2018    Depression     Gastroesophageal reflux disease without esophagitis     Hypertension     Hypothyroid 2/20/2017    Thyroid disease          SURGICAL HISTORY       Past Surgical History:   Procedure Laterality Date    HYSTERECTOMY      THYROIDECTOMY           CURRENT MEDICATIONS       Previous Medications    ASPIRIN 81 MG EC TABLET    Take 81 mg by mouth daily     HYDRALAZINE (APRESOLINE) 100 MG TABLET    Take 1 tablet by mouth 3 times daily as needed (SBP > 160 mmHg)    LEVOTHYROXINE (SYNTHROID) 25 MCG TABLET    Take 2 tablets by mouth Daily    LOSARTAN (COZAAR) 100 MG TABLET    TAKE 1 TABLET EVERY DAY    METOPROLOL SUCCINATE (TOPROL XL) 100 MG EXTENDED RELEASE TABLET    TAKE 1 TABLET EVERY DAY    NIFEDIPINE (ADALAT CC) 30 MG EXTENDED RELEASE TABLET    TAKE 1 TABLET BY MOUTH NIGHTLY    OMEPRAZOLE (PRILOSEC) 20 MG DELAYED RELEASE CAPSULE    TAKE 1 CAPSULE EVERY DAY    TRAMADOL (ULTRAM) 50 MG TABLET    Take 50 mg by mouth every 6 hours as needed for Pain. VITAMIN D (CHOLECALCIFEROL) 25 MCG (1000 UT) TABS TABLET    Take 1,000 Units by mouth daily       ALLERGIES     Amlodipine besylate, Demerol hcl [meperidine], Hydrochlorothiazide, Lisinopril, and Meperidine hcl    FAMILY HISTORY       Family History   Problem Relation Age of Onset    Cancer Brother           SOCIAL HISTORY       Social History     Socioeconomic History    Marital status:      Spouse name: None    Number of children: None    Years of education: None    Highest education level: None   Occupational History    None   Tobacco Use    Smoking status: Former Smoker    Smokeless tobacco: Never Used   Vaping Use    Vaping Use: Never used   Substance and Sexual Activity    Alcohol use: No    Drug use: No    Sexual activity: None   Other Topics Concern    None   Social History Narrative    None     Social Determinants of Health     Financial Resource Strain:     Difficulty of Paying Living Expenses:    Food Insecurity:     Worried About Running Out of Food in the Last Year:     Ran Out of Food in the Last Year:    Transportation Needs:     Lack of Transportation (Medical):      Lack of Transportation (Non-Medical):    Physical Activity:     Days of Exercise per Week:     Minutes of Exercise per Session:    Stress:     Feeling of Stress :    Social Connections:     Frequency of Communication with Friends and Family:     Frequency of Social Gatherings with Friends and Family:     Attends Bahai Services:     Active Member of Clubs or Organizations:     Attends Club or Organization Meetings:     Marital Status:    Intimate Partner Violence:     Fear of Current or Ex-Partner:     Emotionally Abused:     Physically Abused:     Sexually Abused:        SCREENINGS    [place] Coma Scale  Eye Opening: Spontaneous  Best Verbal Response: Confused  Best Motor Response: Obeys commands  Karuna Coma Scale Score: 14          PHYSICAL EXAM    (up to 7 for level 4, 8 or more for level 5)     ED Triage Vitals [10/21/21 2052]   BP Temp Temp Source Pulse Resp SpO2 Height Weight   (!) 183/79 98 °F (36.7 °C) Oral 94 20 97 % 5' 5.5\" (1.664 m) 145 lb (65.8 kg)       Physical Exam    General appearance:  Cooperative. No acute distress. Skin:  Warm. Dry. Eye:  Extraocular movements intact. Ears, nose, mouth and throat:  Oral mucosa moist,  Neck:  Trachea midline. Heart:  Regular rate and rhythm  Perfusion:  intact  Respiratory:  Lungs clear to auscultation bilaterally. Respirations nonlabored. Abdominal:   Non distended. Nontender  Neurological: Awake and will follow simple commands but otherwise minimally verbal.  Disoriented. Moves all extremities spontaneously.   Intact strength in bilateral upper and lower extremities with apparent gross sensation intact throughout  Musculoskeletal:   Normal ROM, no deformities          Psychiatric:  Normal mood      DIAGNOSTIC RESULTS       Labs Reviewed   COMPREHENSIVE METABOLIC PANEL W/ REFLEX TO MG FOR LOW K - Abnormal; Notable for the following components:       Result Value    Anion Gap 17 (*)     Glucose 106 (*)     ALT 7 (*)     AST 13 (*)     All other components within normal limits    Narrative:     Performed at:  [address], [address] [place] [address]   Phone [phone]   TROPONIN - Abnormal; Notable for the following components:    Troponin 0.04 (*)     All other components within normal limits    Narrative:     Performed at:  [address], [facility] [employer]   Phone [phone]   URINE RT REFLEX TO CULTURE - Abnormal; Notable for the following components:    Clarity, UA SL CLOUDY (*)     Bilirubin Urine SMALL (*)     Blood, Urine TRACE-INTACT (*)     Leukocyte Esterase, Urine SMALL (*)     All other components within normal limits    Narrative:     Performed at:  [address], [facility] [employer]   Phone [phone]   MICROSCOPIC URINALYSIS - Abnormal; Notable for the following components:    WBC, UA  (*)     Bacteria, UA 4+ (*)     All other components within normal limits    Narrative:     Performed at:  [address], [facility] [employer]   Phone [phone]   CULTURE, URINE   CBC WITH AUTO DIFFERENTIAL    Narrative:     Performed at:  [address], [facility] [employer]   Phone [phone]   TSH WITH REFLEX   COMPREHENSIVE METABOLIC PANEL W/ REFLEX TO MG FOR LOW K   CBC WITH AUTO DIFFERENTIAL       Interpretation per the Radiologist below, if obtained/available at the time of this note:    XR CHEST PORTABLE   Final Result   Hyperinflated lungs with coarse interstitial markings and old granuloma. Stable appearance from 2020. CT Head WO Contrast   Final Result   No acute hemorrhage or midline shift. Other findings as described. All other labs/imaging were within normal range or not returned as of this dictation.     EMERGENCY DEPARTMENT COURSE and DIFFERENTIAL DIAGNOSIS/MDM:   Vitals:    Vitals:    10/21/21 2356 10/22/21 0103 10/22/21 0207 10/22/21 0322   BP: (!) 170/80 (!) 172/67 (!) 151/96 (!) 176/73   Pulse: 70 64 72 63   Resp: 18 20 20 20   Temp: 97 °F (36.1 °C)   TempSrc:    Oral   SpO2: 96% 98% 100% 98%   Weight:       Height:           EKG: Normal sinus rhythm rate of 71 bpm.  Nonspecific ST segment abnormality laterally. No ST elevation. Overall similar when compared to prior from 6/10/2021. Patient presents emergency department today for reported altered mental status. No focal neurologic deficit. Exam otherwise unremarkable. Head CT negative. Slightly elevated troponin consistent with prior no new EKG changes. Patient found to have obvious UTI. Given Rocephin and plan to admit to the hospital for further treatment    MDM    CONSULTS     IP CONSULT TO HOSPITALIST    Critical Care:   None    REASSESSMENT          PROCEDURE     Unless otherwise noted below, none     Procedures      FINAL IMPRESSION      1. Metabolic encephalopathy    2. Acute cystitis without hematuria            DISPOSITION/PLAN   DISPOSITION Admitted 10/22/2021 12:06:06 AM        PATIENT REFERRED TO:  No follow-up provider specified. DISCHARGE MEDICATIONS:  New Prescriptions    No medications on file     Controlled Substances Monitoring:     RX Monitoring 2/25/2019   Attestation The Prescription Monitoring Report for this patient was reviewed today.    Periodic Controlled Substance Monitoring -       (Please note that portions of this note were completed with a voice recognition program.  Efforts were made to edit the dictations but occasionally words are mis-transcribed.)    Deanna Roblero MD (electronically signed)  Attending Emergency Physician            Arie Ocasio MD  10/22/21 1257

## 2021-10-22 NOTE — ED NOTES
Med rec completed via telephone with the help of pts Son, Tad Castano.      Flex Samson, RN  10/21/21 1322

## 2021-10-22 NOTE — PLAN OF CARE
Problem: Falls - Risk of:  Goal: Will remain free from falls  Description: Will remain free from falls  Outcome: Ongoing  Note: Pt will remain free from falls throughout hospital stay. Fall precautions in place, bed alarm on, bed in lowest position with wheels locked and side rails 2/4 up. Room door open and hourly rounding completed. Will continue to monitor throughout shift.       Problem: Skin Integrity:  Goal: Will show no infection signs and symptoms  Description: Will show no infection signs and symptoms  Outcome: Ongoing

## 2021-10-22 NOTE — H&P
Hospital Medicine History & Physical      PCP: Yisel Etienne MD    Date of Admission: 10/21/2021    Date of Service: Pt seen/examined on 10/21/2021  Pt seen/examined face to face on and admitted as inpatient with expected LOS greater than two midnights due to medical therapy    Pt seen/examined face to face on and admitted as observation with expected LOS less than two midnights but that can change depending on respose to medical therapy and clinical progress. Chief Complaint:    Chief Complaint   Patient presents with    Urinary Tract Infection     family wanted evaluated, dementia. History Of Present Illness:      80 y.o. female who presented to [facility] with past medical hypothyroidism, hypertension, GERD, depression, ovarian cancer, Alzheimer's dementia presented to the ED for acute encephalopathy. Unable to obtain history as patient is encephalopathic. Per report patient has been having worsening confusion poor oral intake and the family was concerned about UTI as patient does have history of this before. No reported fever chills nausea vomiting chest pain or shortness of or abdominal pain. Past Medical History:          Diagnosis Date    Acute bilateral low back pain with bilateral sciatica 6/8/2017    Acute kidney injury ([address]     Anxiety     Cancer (HCC)     ovarian cancer    Degeneration of lumbar or lumbosacral intervertebral disc 5/7/2018    Depression     Gastroesophageal reflux disease without esophagitis     Hypertension     Hypothyroid 2/20/2017    Thyroid disease        Past Surgical History:          Procedure Laterality Date    HYSTERECTOMY      THYROIDECTOMY         Medications Prior to Admission:      Prior to Admission medications    Medication Sig Start Date End Date Taking?  Authorizing Provider   hydrALAZINE (APRESOLINE) 100 MG tablet Take 1 tablet by mouth 3 times daily as needed (SBP > 160 mmHg) 5/14/20  Yes Aditya Haddad MD levothyroxine (SYNTHROID) 25 MCG tablet Take 2 tablets by mouth Daily 5/14/20  Yes Ely Summers MD   traMADol (ULTRAM) 50 MG tablet Take 50 mg by mouth every 6 hours as needed for Pain. Yes Historical Provider, MD   aspirin 81 MG EC tablet Take 81 mg by mouth daily    Yes Historical Provider, MD   NIFEdipine (ADALAT CC) 30 MG extended release tablet TAKE 1 TABLET BY MOUTH NIGHTLY 9/17/19  Yes Historical Provider, MD   losartan (COZAAR) 100 MG tablet TAKE 1 TABLET EVERY DAY 2/26/18  Yes Tennille Mas Nelson, DO   metoprolol succinate (TOPROL XL) 100 MG extended release tablet TAKE 1 TABLET EVERY DAY 2/26/18  Yes Tennille Mas Nelson, DO   omeprazole (PRILOSEC) 20 MG delayed release capsule TAKE 1 CAPSULE EVERY DAY 11/16/17  Yes Tennlile Mas Nelson, DO   vitamin D (CHOLECALCIFEROL) 25 MCG (1000 UT) TABS tablet Take 1,000 Units by mouth daily    Historical Provider, MD       Allergies:  Amlodipine besylate, Demerol hcl [meperidine], Hydrochlorothiazide, Lisinopril, and Meperidine hcl    Social History:          TOBACCO:   reports that she has quit smoking. She has never used smokeless tobacco.  ETOH:   reports no history of alcohol use. E-Cigarettes/Vaping Use     Questions Responses    E-Cigarette/Vaping Use Never User    Start Date     Passive Exposure     Quit Date     Counseling Given     Comments             Family History:      Reviewed in detail         Problem Relation Age of Onset    Cancer Brother        REVIEW OF SYSTEMS:     Unable to obtain accurately    PHYSICAL EXAM PERFORMED:    BP (!) 164/75   Pulse 79   Temp 98.7 °F (37.1 °C) (Oral)   Resp 18   Ht 5' 5.5\" (1.664 m)   Wt 100 lb (45.4 kg)   SpO2 99%   BMI 16.39 kg/m²     General appearance:  mild acute distress, appears older than stated age  HEENT:   atraumatic, sclera anicteric, Conjunctivae clear. Neck: Supple,Trachea midline, no goiter  Respiratory:minimal accessory muscle usage, Normal respiratory effort.  Clear to auscultation, bilaterally  Cardiovascular:  Regular rate and rhythm, capillary refill 2 seconds  Abdomen: Soft, tender, non-distended with normal bowel sounds. No Rovsing sign, no guarding  Musculoskeletal:  No clubbing, cyanosis. Skin: turgor normal.  No new rashes or lesions. Neurologic: Alert and oriented x1, minimally verbal and would not answer questions or participate in exam  Labs:     Recent Labs     10/21/21  2104   WBC 8.7   HGB 13.4   HCT 39.9        Recent Labs     10/21/21  2104      K 3.8      CO2 21   BUN 18   CREATININE 0.8   CALCIUM 10.0     Recent Labs     10/21/21  2104   AST 13*   ALT 7*   BILITOT 0.8   ALKPHOS 83     No results for input(s): INR in the last 72 hours. Recent Labs     10/21/21  2104   TROPONINI 0.04*       Urinalysis:      Lab Results   Component Value Date    NITRU Negative 10/21/2021    WBCUA  10/21/2021    BACTERIA 4+ 10/21/2021    RBCUA 3-4 10/21/2021    BLOODU TRACE-INTACT 10/21/2021    SPECGRAV 1.025 10/21/2021    GLUCOSEU Negative 10/21/2021       Radiology:     CXR: I have reviewed the CXR with the following interpretation:   Hyperinflated with coarse interstitial marking  EKG:  I have reviewed the EKG with the following interpretation:   Normal sinus rhythm QTc 449    XR CHEST PORTABLE   Final Result   Hyperinflated lungs with coarse interstitial markings and old granuloma. Stable appearance from 2020. CT Head WO Contrast   Final Result   No acute hemorrhage or midline shift. Other findings as described.              ASSESSMENT AND PLAN:    Active Hospital Problems    Diagnosis Date Noted    Acute pyelonephritis [N10] 10/21/2021     Acute metabolic encephalopathy: Suspected secondary to UTI, with underlying baseline dementia  CT head negative  Labs ordered  Neurochecks every 4    Acute cystitis:  Urine culture pending, ceftriaxone ordered      NSTEMI: Type II   Troponin elevated in the past continue to trend  Continue to monitor    Malnutrition: Dietitian ordered, much appreciated  Palliative care consulted, much appreciated    Chronic medical conditions:  Hypothyroidism: Continue home medication  Essential Hypertension: Continue home medication  GERD: Continue home medication  CAD: Continue medication    Diet: NPO except meds ordered    DVT Prophylaxis: lovenox    Dispo:   Expected LOS greater than two [address] [facility], DO

## 2021-10-22 NOTE — PROGRESS NOTES
Patient admitted to room 206 from ED. Patient oriented to room, call light, bed rails, phone, lights and bathroom. Patient instructed about the schedule of the day including: vital sign frequency, lab draws, possible tests, frequency of MD and staff rounds, including RN/MD rounding together at bedside, daily weights, and I &O's. Patient instructed about prescribed diet, how to use 8MENU, and television. bed alarm in place, patient aware of placement and reason. Telemetry box  in place, patient aware of placement and reason. Bed locked, in lowest position, side rails up 2/4, call light within reach. Will continue to monitor.

## 2021-10-22 NOTE — ED NOTES
While asking pt orientation questions, pt states, \"I'm done talkin'. \" Pt refusing to answer questions at this time.      Mai Deal RN  10/21/21 8880

## 2021-10-22 NOTE — ED NOTES
Bed: 11  Expected date:   Expected time:   Means of arrival:   Comments:  Medic Mary Ellen Freitas [address]  10/21/21 2049

## 2021-10-22 NOTE — CARE COORDINATION
CASE MANAGEMENT INITIAL ASSESSMENT      Reviewed chart and completed assessment with:patient  Explained Case Management role/services. Primary contact information:see below    Health Care Decision Maker :   Primary Decision Maker: Lucia Aguilar - [phone]          Can this person be reached and be able to respond quickly, such as within a few minutes or hours? Yes    Admit date/status:10/22/2021  Diagnosis:Acute pyelonephritis   Is this a Readmission?:  No      Insurance:[employer]   Precert required for SNF: Yes       3 night stay required: No    Living arrangements, Adls, care needs, prior to admission:  Pt lives w/son, WC at baseline, demented; Son cares for pt    Transportation:ambulance     Durable Medical Equipment at home:  Walker_x_Cane_x_RTS_x_ BSC__Shower Chair__  02__ HHN__ CPAP__  BiPap__  Hospital Bed_x_ W/C_x__ Other__________    Services in the home and/or outpatient, prior to admission:private duty bath aid 2x week    Dialysis Facility (if applicable)   · Name:  · Address:  · Dialysis Schedule:  · Phone:  · Fax:    PT/OT recs:ordered    Hospital Exemption Notification (HEN):not initiated    Barriers to discharge:none    Plan/comments:Pt lives w/son, WC at baseline;  PT/OT ordered for possible SNF recs. Son stated if pt has recs for SNF to make referral to [facility]. Otherwise, pt will d/c home and son will need transport if pt continues to remain combative. CM will continue to follow for needs.  Alan Rodriguez RN      ECOC on chart for MD signature

## 2021-10-23 LAB
A/G RATIO: 1.3 (ref 1.1–2.2)
ALBUMIN SERPL-MCNC: 3 G/DL (ref 3.4–5)
ALP BLD-CCNC: 68 U/L (ref 40–129)
ALT SERPL-CCNC: <5 U/L (ref 10–40)
ANION GAP SERPL CALCULATED.3IONS-SCNC: 8 MMOL/L (ref 3–16)
AST SERPL-CCNC: 12 U/L (ref 15–37)
BASOPHILS ABSOLUTE: 0.1 K/UL (ref 0–0.2)
BASOPHILS RELATIVE PERCENT: 0.9 %
BILIRUB SERPL-MCNC: 0.3 MG/DL (ref 0–1)
BUN BLDV-MCNC: 21 MG/DL (ref 7–20)
CALCIUM SERPL-MCNC: 9.3 MG/DL (ref 8.3–10.6)
CHLORIDE BLD-SCNC: 103 MMOL/L (ref 99–110)
CO2: 26 MMOL/L (ref 21–32)
CREAT SERPL-MCNC: 0.7 MG/DL (ref 0.6–1.2)
EOSINOPHILS ABSOLUTE: 0.6 K/UL (ref 0–0.6)
EOSINOPHILS RELATIVE PERCENT: 9 %
GFR AFRICAN AMERICAN: >60
GFR NON-AFRICAN AMERICAN: >60
GLOBULIN: 2.4 G/DL
GLUCOSE BLD-MCNC: 120 MG/DL (ref 70–99)
HCT VFR BLD CALC: 34.2 % (ref 36–48)
HEMOGLOBIN: 11.4 G/DL (ref 12–16)
LYMPHOCYTES ABSOLUTE: 2.3 K/UL (ref 1–5.1)
LYMPHOCYTES RELATIVE PERCENT: 33 %
MCH RBC QN AUTO: 30.5 PG (ref 26–34)
MCHC RBC AUTO-ENTMCNC: 33.4 G/DL (ref 31–36)
MCV RBC AUTO: 91.3 FL (ref 80–100)
MONOCYTES ABSOLUTE: 0.5 K/UL (ref 0–1.3)
MONOCYTES RELATIVE PERCENT: 7.8 %
NEUTROPHILS ABSOLUTE: 3.4 K/UL (ref 1.7–7.7)
NEUTROPHILS RELATIVE PERCENT: 49.3 %
ORGANISM: ABNORMAL
PDW BLD-RTO: 14.4 % (ref 12.4–15.4)
PLATELET # BLD: 249 K/UL (ref 135–450)
PMV BLD AUTO: 7.5 FL (ref 5–10.5)
POTASSIUM REFLEX MAGNESIUM: 3.8 MMOL/L (ref 3.5–5.1)
RBC # BLD: 3.75 M/UL (ref 4–5.2)
SODIUM BLD-SCNC: 137 MMOL/L (ref 136–145)
TOTAL PROTEIN: 5.4 G/DL (ref 6.4–8.2)
URINE CULTURE, ROUTINE: ABNORMAL
WBC # BLD: 6.9 K/UL (ref 4–11)

## 2021-10-23 PROCEDURE — 97535 SELF CARE MNGMENT TRAINING: CPT

## 2021-10-23 PROCEDURE — G0378 HOSPITAL OBSERVATION PER HR: HCPCS

## 2021-10-23 PROCEDURE — 97162 PT EVAL MOD COMPLEX 30 MIN: CPT

## 2021-10-23 PROCEDURE — 6370000000 HC RX 637 (ALT 250 FOR IP): Performed by: INTERNAL MEDICINE

## 2021-10-23 PROCEDURE — 2580000003 HC RX 258: Performed by: INTERNAL MEDICINE

## 2021-10-23 PROCEDURE — 97166 OT EVAL MOD COMPLEX 45 MIN: CPT

## 2021-10-23 PROCEDURE — 97530 THERAPEUTIC ACTIVITIES: CPT

## 2021-10-23 PROCEDURE — 97110 THERAPEUTIC EXERCISES: CPT

## 2021-10-23 PROCEDURE — 96366 THER/PROPH/DIAG IV INF ADDON: CPT

## 2021-10-23 PROCEDURE — 1200000000 HC SEMI PRIVATE

## 2021-10-23 PROCEDURE — 96372 THER/PROPH/DIAG INJ SC/IM: CPT

## 2021-10-23 PROCEDURE — 6360000002 HC RX W HCPCS: Performed by: INTERNAL MEDICINE

## 2021-10-23 PROCEDURE — 85025 COMPLETE CBC W/AUTO DIFF WBC: CPT

## 2021-10-23 PROCEDURE — 80053 COMPREHEN METABOLIC PANEL: CPT

## 2021-10-23 PROCEDURE — 36415 COLL VENOUS BLD VENIPUNCTURE: CPT

## 2021-10-23 RX ADMIN — ASPIRIN 81 MG: 81 TABLET, COATED ORAL at 09:31

## 2021-10-23 RX ADMIN — SODIUM CHLORIDE, PRESERVATIVE FREE 10 ML: 5 INJECTION INTRAVENOUS at 09:32

## 2021-10-23 RX ADMIN — PANTOPRAZOLE SODIUM 40 MG: 40 TABLET, DELAYED RELEASE ORAL at 09:32

## 2021-10-23 RX ADMIN — ENOXAPARIN SODIUM 30 MG: 30 INJECTION SUBCUTANEOUS at 09:31

## 2021-10-23 RX ADMIN — LEVOTHYROXINE SODIUM 50 MCG: 0.05 TABLET ORAL at 09:32

## 2021-10-23 RX ADMIN — SODIUM CHLORIDE, PRESERVATIVE FREE 10 ML: 5 INJECTION INTRAVENOUS at 20:19

## 2021-10-23 RX ADMIN — CEFTRIAXONE SODIUM 1000 MG: 1 INJECTION, POWDER, FOR SOLUTION INTRAMUSCULAR; INTRAVENOUS at 20:19

## 2021-10-23 RX ADMIN — LOSARTAN POTASSIUM 100 MG: 100 TABLET, FILM COATED ORAL at 09:31

## 2021-10-23 ASSESSMENT — PAIN SCALES - GENERAL
PAINLEVEL_OUTOF10: 10
PAINLEVEL_OUTOF10: 10

## 2021-10-23 ASSESSMENT — PAIN DESCRIPTION - DESCRIPTORS: DESCRIPTORS: ACHING

## 2021-10-23 ASSESSMENT — PAIN DESCRIPTION - FREQUENCY: FREQUENCY: CONTINUOUS

## 2021-10-23 ASSESSMENT — PAIN DESCRIPTION - PAIN TYPE: TYPE: CHRONIC PAIN

## 2021-10-23 ASSESSMENT — PAIN DESCRIPTION - ORIENTATION: ORIENTATION: RIGHT;LEFT

## 2021-10-23 ASSESSMENT — PAIN DESCRIPTION - LOCATION: LOCATION: LEG

## 2021-10-23 NOTE — PROGRESS NOTES
Occupational Therapy   Occupational Therapy Initial Assessment/Treatment  Date: 10/23/2021   Patient Name: Nayeli Pool  MRN: [id number]     : 1935    Date of Service: 10/23/2021    Discharge Recommendations:  24 hour supervision or assist  OT Equipment Recommendations  Equipment Needed: No    Assessment   Performance deficits / Impairments: Decreased functional mobility ; Decreased ADL status; Decreased cognition;Decreased safe awareness;Decreased ROM; Decreased strength;Decreased sensation;Decreased posture;Decreased balance;Decreased coordination;Decreased high-level IADLs    Assessment: Pt is a 81yo female with deficits in the areas listed above with a UTI and history of dementia. Pt is a questionable historian but per pt and chart, pt has been bed bound for the past 4 years but assists in her ADL care activities. Today, performed bed mobility with min A. Pt performing simple grooming with set up and supervision but with SBA-min A to remain seated EOB. Pt performed LB ADLS with total assist. Pt able to follow commands with verbal cues. Pt would continue to benefit from skilled OT services. Recommend home with 24hr A. Prognosis: Fair  Decision Making: Medium Complexity  OT Education: OT Role;Plan of Care;ADL Adaptive Strategies  Patient Education: disease specific: Bed mobility, safety, red call light use, rolling in bed for ADLS. Pt verbalized understanding but will need reinforcement. Barriers to Learning: cognition. REQUIRES OT FOLLOW UP: Yes  Activity Tolerance  Activity Tolerance: Treatment limited secondary to agitation;Treatment limited secondary to decreased cognition  Activity Tolerance: /68, HR 73, O2 95%  Safety Devices  Safety Devices in place: Yes  Type of devices: Nurse notified;Call light within reach; Bed alarm in place; Left in bed           Patient Diagnosis(es): The primary encounter diagnosis was Metabolic encephalopathy.  A diagnosis of Acute cystitis without hematuria was also pertinent to this visit. has a past medical history of Acute bilateral low back pain with bilateral sciatica, Acute kidney injury ([address] Anxiety, Cancer ([address] Degeneration of lumbar or lumbosacral intervertebral disc, Depression, Gastroesophageal reflux disease without esophagitis, Hypertension, Hypothyroid, and Thyroid disease. has a past surgical history that includes Hysterectomy and Thyroidectomy. Restrictions  Restrictions/Precautions  Restrictions/Precautions: General Precautions, Fall Risk  Position Activity Restriction  Other position/activity restrictions: Up with assist.    Subjective   General  Chart Reviewed: Yes  Patient assessed for rehabilitation services?: Yes  Additional Pertinent Hx: Alzheimer's Disease  Response to previous treatment: Patient with no complaints from previous session  Family / Caregiver Present: No  Referring Practitioner: Kamila Meredith MD  Diagnosis: Acute metabolic encephalopathy  Subjective  Subjective: Pt supine and agreeable to therapy. General Comment  Comments: RN approved therapy. Patient Currently in Pain: Yes  Pain Assessment  Pain Level: 10  Pain Type: Chronic pain  Pain Location: Leg  Pain Orientation: Right;Left  Pain Descriptors: Aching  Pain Frequency: Continuous  Non-Pharmaceutical Pain Intervention(s): Ambulation/Increased Activity;Repositioned; Therapeutic presence  Response to Pain Intervention: Patient Satisfied  Vital Signs  Pulse: 73  Heart Rate Source: Monitor  BP: 135/68  BP Location: Right upper arm  Patient Position: Semi fowlers  Patient Currently in Pain: Yes  Oxygen Therapy  SpO2: 95 %  Pulse Oximeter Device Mode: Intermittent  Pulse Oximeter Device Location: Finger  O2 Device: None (Room air)  Social/Functional History  Social/Functional History  Lives With: Son  Type of Home: House  Home Layout: One level (Pt states that her son's home is one level.)  Home Access: Stairs to enter without rails  Entrance Stairs - Number of Steps: 1  Bathroom Shower/Tub: Tub/Shower unit, Shower chair without back  [employer]: Handicap height  Bathroom Equipment: Shower chair  Bathroom Accessibility: Wheelchair accessible  Home Equipment: 4 wheeled walker, [place] Help From: Personal care attendant (Patient said that she has an aide one day a week for about 1 hour. \"She is mostly just there to help me get to the shower once a week. \")  ADL Assistance: Needs assistance (\"My aide wheels me to my shower at home once a week. \" \"She lifts me in and out of the shower. \")  Bath: Dependent/Total (\"The aide lifts me in and out of the shower. Then I am able to help with washing myself. \")  Dressing: Unable to assess(comment) (Pt stating that she does not need help with dressing with is inconsistent from earlier PT note.)  Grooming: Moderate assistance (Son cares for dentures because, \"I can't get to the bathroom\". Pt can wash her face.)  Feeding: Modified independent   Toileting: Needs assistance  Homemaking Assistance: Needs assistance  Meal Prep: Total  Laundry: Total  Vacuuming: Total  Cleaning: Total  Homemaking Responsibilities: No  Ambulation Assistance: Needs assistance (patient his been non-ambulatory in about 4 years per patient)  Transfer Assistance: Needs assistance (patient needs total assist from son for transfers)  Active : No  Additional Comments: No falls in the past 6 months. Patient is a poor historian so all information will need to be confirmed by social work/family. Some information was obtained from patient and some from patient's previous PT eval in 2020. Per chart and patient, the patient's son provides assistance for ADLs and lifts patient for all transfers and dependently pushes patient in her wheelchair at baseline.        Objective   Vision: Impaired  Vision Exceptions: Wears glasses for reading  Hearing: Exceptions to [facility]  Hearing Exceptions: Hard of hearing/hearing concerns (patient said that she has hearing aides but she does not wear them because they do not help her)    Orientation  Overall Orientation Status: Impaired  Orientation Level: Oriented to place;Oriented to person;Oriented to situation;Disoriented to time  Observation/Palpation  Posture: Fair (SBA-min A to sit EOB with L lean.)  Body Mechanics: kyphotic posture. Balance  Sitting Balance: Minimal assistance (SBA-Min A to sit EOB with L lean.)  Standing Balance: Unable to assess(comment) (Pt bed bound for past 4 years.)  Standing Balance  Comment: Pt seated EOB for ~5min for ADLS and ther ex. ADL  Grooming: Setup;Supervision (seated EOB to comb hair and wash her face.)  LE Dressing: Dependent/Total (total a to doff brief. Pt becoming agitated and declining new brief to be donned.)  Toileting: Dependent/Total (Continent of bowels, purwick. Max A for pericare.)  Tone RUE  RUE Tone: Normotonic  Tone LUE  LUE Tone: Normotonic  Coordination  Movements Are Fluid And Coordinated: No  Coordination and Movement description: Fine motor impairments;Tremors;Right UE;Left UE     Bed mobility  Rolling to Left: Stand by assistance  Rolling to Right: Stand by assistance  Supine to Sit: Minimal assistance  Sit to Supine: Minimal assistance  Scooting: Unable to assess  Comment: HOB elevated. Transfers  Sit to stand: Unable to assess  Stand to sit: Unable to assess     Cognition  Overall Cognitive Status: Exceptions  Arousal/Alertness: Delayed responses to stimuli (however patient is also extremely hard of hearing.)  Following Commands: Follows one step commands with increased time; Follows one step commands with repetition (however patient is also extremely hard of hearing)  Attention Span: Attends with cues to redirect  Memory: Decreased recall of recent events;Decreased short term memory;Decreased recall of biographical Information  Safety Judgement: Decreased awareness of need for safety;Decreased awareness of need for assistance  Problem Solving: Decreased awareness of errors  Insights: Decreased awareness of deficits  Initiation: Requires cues for some  Sequencing: Requires cues for some  Cognition Comment: Pt becoming agitated at the end of the session. 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 Exercises  Shoulder Flexion: x10  Shoulder Extension: x10  Elbow Flexion: x10  Elbow Extension: x10  Grasp/Release: x10     LUE AROM (degrees)  LUE AROM : WFL  LUE General AROM: Shoulder flexion less than 90 degrees  Left Hand AROM (degrees)  Left Hand AROM: WFL  RUE AROM (degrees)  RUE AROM : WFL  RUE General AROM: Shoulder flexion less than 90 degrees  Right Hand AROM (degrees)  Right Hand AROM: WFL  LUE Strength  Gross LUE Strength: WFL  L Hand General: 4/5  LUE Strength Comment: 3+/5 for shoulder flexion. 4/5 grossly for all others tested. RUE Strength  Gross RUE Strength: WFL  R Hand General: 4/5  RUE Strength Comment: Grossly 4/5                   Plan   Plan  Times per week: 2-3x/week  Current Treatment Recommendations: Strengthening, Safety Education & Training, Patient/Caregiver Education & Training, Self-Care / ADL      AM-PAC Score        AM-[facility] Inpatient Daily Activity Raw Score: 13 (10/23/21 1716)  AM-PAC Inpatient ADL T-Scale Score : 32.03 (10/23/21 1716)  ADL Inpatient CMS 0-100% Score: 63.03 (10/23/21 1716)  ADL Inpatient CMS G-Code Modifier : CL (10/23/21 1716)    Goals  Short term goals  Time Frame for Short term goals: 1 week (10/29) unless stated otherwise. Short term goal 1: Pt will UB dress with mod A. Short term goal 2: Pt will sit EOB to perform at least 7mins of ADLS with supervision (10/27). Short term goal 3: Pt will UB bathe with mod A. Patient Goals   Patient goals : \"to comb my hair. \"       Therapy Time   Individual Concurrent Group Co-treatment   Time In 336         Time Out Scottie Cadena         Timed Code Treatment Minutes: 24 Minutes (10min eval)       Prisca Cedeno, OTR/L  If pt discharges prior to next session, this note will serve as discharge summary. See case management note for discharge disposition.

## 2021-10-23 NOTE — PROGRESS NOTES
Hospitalist Progress Note      PCP: Josseline Ponce MD    Date of Admission: 10/21/2021    Chief Complaint: lethargy, increased confusion    Hospital Course:      80 y.o. female with pmh of  hypothyroidism, hypertension, GERD, depression, ovarian cancer, Alzheimer's dementia presented to the ED for acute encephalopathy.     At the time of admit were unable to obtain history as patient was encephalopathic.  Per report patient had been having worsening confusion poor oral intake and the family was concerned about UTI as patient does have history of this before.  No reported fever chills nausea vomiting chest pain or shortness of or abdominal pain.     Pt Seen/Examined and Chart Reviewed. Admitting dx: Acute metabolic encephalopathy secondary to UTI    Subjective: up in bed, in nad. Eating better, has some confusion but per report is close to baseline. Medications:  Reviewed    Infusion Medications    sodium chloride       Scheduled Medications    aspirin  81 mg Oral Daily    levothyroxine  50 mcg Oral Daily    losartan  100 mg Oral Daily    NIFEdipine  30 mg Oral Daily    pantoprazole  40 mg Oral QAM AC    enoxaparin  30 mg SubCUTAneous Daily    sodium chloride flush  10 mL IntraVENous 2 times per day    cefTRIAXone (ROCEPHIN) IV  1,000 mg IntraVENous Q24H     PRN Meds: labetalol, hydrALAZINE, traMADol, sodium chloride flush, sodium chloride, potassium chloride, magnesium sulfate, promethazine **OR** ondansetron, acetaminophen **OR** acetaminophen      Intake/Output Summary (Last 24 hours) at 10/23/2021 1051  Last data filed at 10/23/2021 0645  Gross per 24 hour   Intake 400 ml   Output 200 ml   Net 200 ml       Physical Exam Performed:    BP (!) 115/57   Pulse 65   Temp 98 °F (36.7 °C) (Axillary)   Resp 16   Ht 5' 5.5\" (1.664 m)   Wt 82 lb 12.8 oz (37.6 kg)   SpO2 96%   BMI 13.57 kg/m²     General appearance: No apparent distress,pleasant elderly female,  pleasant and cooperative.   HEENT: Pupils equal, round, and reactive to light. Conjunctivae/corneas clear. Neck: Supple, with full range of motion. No jugular venous distention. Trachea midline. Respiratory:  Normal respiratory effort. Clear to auscultation, bilaterally without Rales/Wheezes/Rhonchi. Cardiovascular: Regular rate and rhythm with normal S1/S2 . Abdomen: Soft, non-tender, non-distended with normal bowel sounds. Musculoskeletal: No clubbing, cyanosis or edema bilaterally. Full range of motion without deformity. .  Neurologic: Alert and oriented x1, hard of hearing. Grossly non focal.  Moving PATEL equal bilat. Psychiatric: Awake, oriented to self, able to tell me the name of hospital. Short term recal is poor recall 1/3 objects after 1 minute. Peripheral Pulses: +2 palpable, equal bilaterally       Labs:   Recent Labs     10/21/21  2104 10/22/21  0708 10/23/21  0821   WBC 8.7 7.1 6.9   HGB 13.4 12.2 11.4*   HCT 39.9 37.0 34.2*    274 249     Recent Labs     10/21/21  2104 10/22/21  0708 10/23/21  0820    140 137   K 3.8 3.9 3.8    102 103   CO2 21 22 26   BUN 18 21* 21*   CREATININE 0.8 0.7 0.7   CALCIUM 10.0 9.6 9.3     Recent Labs     10/21/21  2104 10/22/21  0708 10/23/21  0820   AST 13* 11* 12*   ALT 7* 6* <5*   BILITOT 0.8 0.5 0.3   ALKPHOS 83 76 68     No results for input(s): INR in the last 72 hours. Recent Labs     10/22/21  0746 10/22/21  1253 10/22/21  1904   TROPONINI 0.05* 0.04* 0.05*       Urinalysis:      Lab Results   Component Value Date    NITRU Negative 10/21/2021    WBCUA  10/21/2021    BACTERIA 4+ 10/21/2021    RBCUA 3-4 10/21/2021    BLOODU TRACE-INTACT 10/21/2021    SPECGRAV 1.025 10/21/2021    GLUCOSEU Negative 10/21/2021       Radiology:  XR CHEST PORTABLE   Final Result   Hyperinflated lungs with coarse interstitial markings and old granuloma. Stable appearance from 2020. CT Head WO Contrast   Final Result   No acute hemorrhage or midline shift.       Other findings as described. Assessment/Plan:    Active Hospital Problems    Diagnosis     Acute pyelonephritis [N10]      Acute metabolic encephalopathy:  -Suspected secondary to UTI, with underlying baseline Alzheimer dementia  - CT head negative  - Labs: CBC, BMP, TSH  - Neurochecks  -Treat acute cystitis as outlined     Acute cystitis:  -Urine culture reviewed: e. coli  -Abx:  ceftriaxone       Elevated troponin  - PersistentTroponin elevated in the past.  Baseline 0.03-0.05. Will continue to trend  -Likely exacerbated by poorly controlled hypertensive heart disease.   -Patient is asymptomatic. No anginal equivalents  -EKG/Tele monitoring     Malnutrition: BMI 16.39  - Dietitian consulted, recommendations much appreciated  -Supplements to tray as recommended     Hypothyroidism:   -Stable without secondary symtpoms  -Meds: Levothyroxine  -Optimize Thyroid function as outpt with PCP.     Essential Hypertension in the setting of known CAD  -Stable and well controlled  -Meds: ASA, Cozaar, Procardia  -Hydralazine 100 mg po TID prn SBP > 160 mmHg as per home dosing  -Cont to optimize BP as outpt with PCP     GERD:   -Stable without red alarm symptoms  -Meds: Protonix          DVT Prophylaxis: lovenox  Diet: ADULT DIET; Easy to Chew  ADULT ORAL NUTRITION SUPPLEMENT; Breakfast, Lunch, Dinner; Standard High Calorie/High Protein Oral Supplement  Code Status: Full Code      Dispo - family now informed nursing they would like long term care.  consulted.     Ayah Moreno MD

## 2021-10-23 NOTE — CARE COORDINATION
Spoke to pt son (POA and caretaker) and reviewed recs and clarified he does not want LTC, just a skilled stay if possible, or a private pay respite stay if not possible. Placed referral to [facility] and informed liason of requests.

## 2021-10-23 NOTE — PROGRESS NOTES
PT goals identified)  REQUIRES PT FOLLOW UP: No  Activity Tolerance  Activity Tolerance: Patient limited by cognitive status; Patient limited by fatigue;Patient limited by endurance  Activity Tolerance: Pre activity: 115/67 96% room air 66 BPM.       Patient Diagnosis(es): The primary encounter diagnosis was Metabolic encephalopathy. A diagnosis of Acute cystitis without hematuria was also pertinent to this visit. has a past medical history of Acute bilateral low back pain with bilateral sciatica, Acute kidney injury ([address] Anxiety, Cancer ([address] Degeneration of lumbar or lumbosacral intervertebral disc, Depression, Gastroesophageal reflux disease without esophagitis, Hypertension, Hypothyroid, and Thyroid disease. has a past surgical history that includes Hysterectomy and Thyroidectomy. Restrictions  Restrictions/Precautions  Restrictions/Precautions: General Precautions, Fall Risk  Position Activity Restriction  Other position/activity restrictions: Up with assist.  Vision/Hearing  Vision: Impaired  Vision Exceptions: Wears glasses for reading  Hearing: Exceptions to [facility]  Hearing Exceptions: Hard of hearing/hearing concerns (patient said that she has hearing aides but she does not wear them because they do not help her.)     Subjective  General  Chart Reviewed: Yes  Patient assessed for rehabilitation services?: Yes  Additional Pertinent Hx: HPI per chart, \"80 y.o. female who presented to [facility] with past medical hypothyroidism, hypertension, GERD, depression, ovarian cancer, Alzheimer's dementia presented to the ED for acute encephalopathy. Per report patient has been having worsening confusion poor oral intake and the family was concerned about UTI as patient does have history of this before.   Admitting dx: Acute metabolic encephalopathy secondary to UTI\"  Response To Previous Treatment: Not applicable  Referring Practitioner: Domingo Chavez MD  Referral Date : 10/22/21  Follows Commands: Impaired  General Comment  Comments: Patient supine in bed upon entry of therapy staff. Cleared for therapy by patient's RN  Subjective  Subjective: Patient agreed to participate. Pain Screening  Patient Currently in Pain: Yes  Vital Signs  Patient Currently in Pain: No       Orientation  Orientation  Overall Orientation Status: Impaired  Orientation Level: Oriented to person;Oriented to place; Disoriented to time;Disoriented to situation  Social/Functional History  Social/Functional History  Lives With: Son  Type of Home: House  Home Layout: One level (Pt states that her son's home is one level.)  Home Access: Stairs to enter without rails  Entrance Stairs - Number of Steps: 1  Bathroom Shower/Tub: Tub/Shower unit, Shower chair without back  Bathroom Toilet: Handicap height  Bathroom Equipment: Shower chair  Bathroom Accessibility: Wheelchair accessible  Home Equipment: 4 wheeled walker, [address] Help From: Personal care attendant (Patient said that she has an aide one day a week for about 1 hour. \"She is mostly just there to help me get to the shower once a week. \")  ADL Assistance: Needs assistance (\"My aide wheels me to my shower at home once a week. \" \"She lifts me in and out of the shower. \")  Bath: Dependent/Total (\"The aide lifts me in and out of the shower. Then I am able to help with washing myself. \")  Dressing: Unable to assess(comment) (Pt stating that she does not need help with dressing with is inconsistent from earlier PT note.)  Grooming: Moderate assistance (Son cares for dentures because, \"I can't get to the bathroom\". Pt can wash her face.)  Feeding: Modified independent   Toileting: Needs assistance  Homemaking Assistance: Needs assistance  Meal Prep: Total  Laundry: Total  Vacuuming: Total  Cleaning:  Total  Homemaking Responsibilities: No  Ambulation Assistance: Needs assistance (patient his been non-ambulatory in about 4 years per patient)  Transfer Assistance: Needs assistance (patient needs total assist from son for transfers)  Active : No  Additional Comments: No falls in the past 6 months. Patient is a poor historian so all information will need to be confirmed by social work/family. Some information was obtained from patient and some from patient's previous PT eval in 2020. Per chart and patient, the patient's son provides assistance for ADLs and lifts patient for all transfers and dependently pushes patient in her wheelchair at baseline. Cognition   Cognition  Overall Cognitive Status: Exceptions  Arousal/Alertness: Delayed responses to stimuli (however patient is also extremely hard of hearing)  Following Commands: Follows one step commands with increased time; Follows one step commands with repetition (however patient is also extremely hard of hearing)  Attention Span: Attends with cues to redirect  Memory: Decreased recall of recent events;Decreased short term memory  Safety Judgement: Decreased awareness of need for safety;Decreased awareness of need for assistance  Problem Solving: Decreased awareness of errors  Insights: Decreased awareness of deficits  Initiation: Requires cues for some  Sequencing: Requires cues for some    Objective     Observation/Palpation  Posture: Poor  Body Mechanics: kyphotic posture. PROM RLE (degrees)  RLE PROM: WFL  AROM RLE (degrees)  RLE General AROM: little to no right ankle plantarflexion/dorsiflexion.   PROM LLE (degrees)  LLE PROM: WFL  AROM LLE (degrees)  LLE AROM : WFL  Strength RLE  Strength RLE: Exception  R Hip Flexion: 3+/5  R Knee Flexion: 3+/5  R Knee Extension: 3+/5  R Ankle Dorsiflexion: 1/5  R Ankle Plantar flexion: 1/5  Strength LLE  Strength LLE: Exception  L Hip Flexion: 3+/5  L Knee Flexion: 3+/5  L Knee Extension: 3+/5  L Ankle Dorsiflexion: 3+/5  L Ankle Plantar Flexion: 3+/5     Sensation  Overall Sensation Status: Impaired (patient said that she has chronic neuropathy in bilateral legs)  Bed mobility  Bridging: Moderate assistance  Rolling to Left: Moderate assistance  Rolling to Right: Moderate assistance  Supine to Sit: Maximum assistance  Sit to Supine: Maximum assistance  Scooting: Moderate assistance  Comment: head of bed elevated  Transfers  Sit to Stand: Maximum Assistance  Stand to sit: Maximum Assistance  Bed to Chair: Unable to assess (patient requested to return to bed)  Comment: poor eccentric control of hip extensors. cueing for hand placement. Ambulation  Ambulation?: No (patient is non-ambulatory at baseline)     Balance  Posture: Poor  Sitting - Static: Fair  Sitting - Dynamic: Fair  Standing - Static: Poor  Comments: patient sat at edge of bed for ~10 minutes with CGA while she completed her exercises  Exercises  Hip Flexion: 1 x 10 bilateral  Hip Abduction: 1 x 10 bilateral  Knee Long Arc Quad: 1 x 10 bilateral  Ankle Pumps: 1 x 10 LLE. not completed on RLE due to foot drop. Other exercises  Other exercises?: No     Plan   Plan  Times per week: N/A PT signing off  Plan weeks: N/A PT signing off  Specific instructions for Next Treatment: N/A PT signing off  Safety Devices  Type of devices: All fall risk precautions in place, Bed alarm in place, Call light within reach, Nurse notified, Gait belt, Patient at risk for falls, Left in bed    AM-PAC Score     AM-PAC Inpatient Mobility without Stair Climbing Raw Score : 8 (10/23/21 0925)  AM-PAC Inpatient without Stair Climbing T-Scale Score : 30.65 (10/23/21 0925)  Mobility Inpatient CMS 0-100% Score: 80.91 (10/23/21 0925)  Mobility Inpatient without Stair CMS G-Code Modifier : CM (10/23/21 9065)       Goals  Short term goals  Time Frame for Short term goals: 1 session 10/23  Short term goal 1: Patient will complete bed mobility and transfers with maximum assistance. 10/23 goal met. Patient Goals   Patient goals : No goals stated by patient due to her cognitive impairment.        Therapy Time   Individual Concurrent Group Co-treatment   Time In 0825 Time Out 0858         Minutes 33         Timed Code Treatment Minutes: 23 Minutes (10 minute evaluation)       Tory Camacho, PT

## 2021-10-23 NOTE — CARE COORDINATION
Aware of consult for LTC referral request from pt family. Called and left vm for son to return call- confirming he still wants [facility] for LTC referral as he had requested for SNF.

## 2021-10-23 NOTE — DISCHARGE INSTR - COC
Continuity of Care Form    Patient Name: Ofelia Cm   :  1935  MRN:  [id number]    Admit date:  10/21/2021  Discharge date:  ***    Code Status Order: Full Code   Advance Directives:     Admitting Physician:  Aida Romero DO  PCP: Luisa Silver MD    Discharging Nurse: [facility] Unit/Room#: 0206/0206-01  Discharging Unit Phone Number: ***    Emergency Contact:   Extended Emergency Contact Information  Primary Emergency Contact: Troy Winston  Address: [place], [address] [facility] Phone: [phone]  Mobile Phone: [phone]  Relation: Child    Past Surgical History:  Past Surgical History:   Procedure Laterality Date    HYSTERECTOMY      THYROIDECTOMY         Immunization History: There is no immunization history on file for this patient. Active Problems:  Patient Active Problem List   Diagnosis Code    Essential hypertension I10    Hematuria R31.9    Grief reaction F43.21    Cystitis N30.90    Anxiety F41.9    Chest pain R07.9    Hypothyroid E03.9    Hyponatremia E87.1    Ovarian cancer ([address] C56.9    Chronic right shoulder pain M25.511, G89.29    Left foot pain M79.672    Postoperative hypothyroidism E89.0    Constipation K59.00    Acute bilateral low back pain with bilateral sciatica M54.42, M54.41    Thoracic compression fracture (HCC) S22.000A    Urinary tract infection without hematuria N39.0    Abdominal bloating R14.0    Acute cystitis with hematuria N30.01    Infective urethritis N34.2    Displacement of lumbar intervertebral disc without myelopathy M51.26    Degeneration of lumbar or lumbosacral intervertebral disc M51.37    Lumbosacral spondylosis without myelopathy M47.817    Functional diarrhea K59.1    Chronic abdominal pain R10.9, G89.29    Fall at home, initial encounter W19. XXXA, Y92.009    SOB (shortness of breath) R06.02    Acute metabolic encephalopathy J31.09    Acute kidney injury ([address] N17.9    Hallucinations R44.3    Myoclonus G25.3    Major neurocognitive disorder due to Alzheimer's disease, probable, with behavioral disturbance F03.91    PAF (paroxysmal atrial fibrillation) ([facility]) I48.0    Gastroesophageal reflux disease without esophagitis K21.9    Bilateral lower extremity edema R60.0    Venous embolism and thrombosis of superficial vessels of lower extremity, right I82.811    Asymptomatic bacteriuria R82.71    Acute pyelonephritis N10       Isolation/Infection:   Isolation          No Isolation        Patient Infection Status     Infection Onset Added Last Indicated Last Indicated By Review Planned Expiration Resolved Resolved By    None active    Resolved    COVID-19 Rule Out 20 COVID-19 (Ordered)   20 Rule-Out Test Resulted    COVID-19 Rule Out 20 COVID-19 (Ordered)   20 Rule-Out Test Resulted    COVID-19 Rule Out 10/30/20 10/30/20 10/30/20 COVID-19 (Ordered)   10/30/20 Rule-Out Test Resulted          Nurse Assessment:  Last Vital Signs: BP (!) 157/72   Pulse 79   Temp 97.4 °F (36.3 °C) (Oral)   Resp 16   Ht 5' 5.5\" (1.664 m)   Wt 82 lb 12.8 oz (37.6 kg)   SpO2 99%   BMI 13.57 kg/m²     Last documented pain score (0-10 scale): Pain Level: 0  Last Weight:   Wt Readings from Last 1 Encounters:   10/23/21 82 lb 12.8 oz (37.6 kg)     Mental Status:  {IP PT MENTAL STATUS:}    IV Access:  { SIMRAN IV ACCESS:699054884}    Nursing Mobility/ADLs:  Walking   {[facility] DME MBMP:442855785}  Transfer  {[facility] DME ZBIH:953001398}  Bathing  {[facility] DME DSPM:620968522}  Dressing  {[facility] DME CNS}  Toileting  {[facility] DME UMLX:278489884}  Feeding  {[facility] DME MVCV:976811081}  Med Admin  {[facility] DME SZJN:941200452}  Med Delivery   { SIMRAN MED Delivery:000961387}    Wound Care Documentation and Therapy:  Wound 10/30/20 Pretibial Distal;Right Area open, it appears to be a section of skin torn off as the edges are sharp.  They are healing. The center of the wound is yellow an has a small amt of drainage. Pt is bedbound and lays with her left leg on the area (Active)   Number of days: 357        Elimination:  Continence:   · Bowel: {YES / OT:71642}  · Bladder: {YES / BL:25043}  Urinary Catheter: {Urinary Catheter:716416140}   Colostomy/Ileostomy/Ileal Conduit: {YES / NN:08752}       Date of Last BM: ***    Intake/Output Summary (Last 24 hours) at 10/23/2021 1430  Last data filed at 10/23/2021 0645  Gross per 24 hour   Intake 400 ml   Output 200 ml   Net 200 ml     I/O last 3 completed shifts:   In: 26 [P.O.:470; IV Piggyback:50]  Out: 200 [Urine:200]    Safety Concerns:     [address] Safety Concerns:461223988}    Impairments/Disabilities:      [address] Impairments/Disabilities:607149139}    Nutrition Therapy:  Current Nutrition Therapy:   [address] Diet List:632968551}    Routes of Feeding: {P DME Other Feedings:088566135}  Liquids: {Slp liquid thickness:93436}  Daily Fluid Restriction: {CHP DME Yes amt example:840202488}  Last Modified Barium Swallow with Video (Video Swallowing Test): {Done Not Done FLWU:936835269}    Treatments at the Time of Hospital Discharge:   Respiratory Treatments: ***  Oxygen Therapy:  {Therapy; copd oxygen:51952}  Ventilator:    {[facility] B[facility]L:194001250}    Rehab Therapies: {THERAPEUTIC INTERVENTION:0251833076}  Weight Bearing Status/Restrictions: [address] [employer] Weight Bearin}  Other Medical Equipment (for information only, NOT a DME order):  {EQUIPMENT:204139661}  Other Treatments: ***    Patient's personal belongings (please select all that are sent with patient):  {[facility] DME Belongings:526755079}    RN SIGNATURE:  {Esignature:072945029}    CASE MANAGEMENT/SOCIAL WORK SECTION    Inpatient Status Date: ***    Readmission Risk Assessment Score:  Readmission Risk              Risk of Unplanned Readmission:  23           Discharging to Facility/ Agency   · Name:   · Address:  · Phone:  · Fax:    Dialysis Facility (if applicable)   · Name:  · Address:  · Dialysis Schedule:  · Phone:  · Fax:    / signature: {Esignature:048106624}    PHYSICIAN SECTION    Prognosis: {Prognosis:3293753311}    Condition at Discharge: Leobardo Schneider Patient Condition:399613039}    Rehab Potential (if transferring to Rehab): {Prognosis:3089233825}    Recommended Labs or Other Treatments After Discharge: ***    Physician Certification: I certify the above information and transfer of Librado Hensley  is necessary for the continuing treatment of the diagnosis listed and that she requires {Admit to Appropriate Level of Care:20587} for {GREATER/LESS:229368506} 30 days.      Update Admission H&P: {CHP DME Changes in GVXOF:594909402}    PHYSICIAN SIGNATURE:  {Esignature:169744499}

## 2021-10-24 PROCEDURE — 6360000002 HC RX W HCPCS: Performed by: INTERNAL MEDICINE

## 2021-10-24 PROCEDURE — 1200000000 HC SEMI PRIVATE

## 2021-10-24 PROCEDURE — 6370000000 HC RX 637 (ALT 250 FOR IP): Performed by: INTERNAL MEDICINE

## 2021-10-24 PROCEDURE — G0378 HOSPITAL OBSERVATION PER HR: HCPCS

## 2021-10-24 PROCEDURE — 96372 THER/PROPH/DIAG INJ SC/IM: CPT

## 2021-10-24 PROCEDURE — 2580000003 HC RX 258: Performed by: INTERNAL MEDICINE

## 2021-10-24 RX ADMIN — ASPIRIN 81 MG: 81 TABLET, COATED ORAL at 08:50

## 2021-10-24 RX ADMIN — LEVOTHYROXINE SODIUM 50 MCG: 0.05 TABLET ORAL at 09:33

## 2021-10-24 RX ADMIN — PANTOPRAZOLE SODIUM 40 MG: 40 TABLET, DELAYED RELEASE ORAL at 09:33

## 2021-10-24 RX ADMIN — ENOXAPARIN SODIUM 30 MG: 30 INJECTION SUBCUTANEOUS at 08:50

## 2021-10-24 RX ADMIN — SODIUM CHLORIDE, PRESERVATIVE FREE 10 ML: 5 INJECTION INTRAVENOUS at 09:34

## 2021-10-24 RX ADMIN — LOSARTAN POTASSIUM 100 MG: 100 TABLET, FILM COATED ORAL at 08:50

## 2021-10-24 RX ADMIN — NIFEDIPINE 30 MG: 30 TABLET, FILM COATED, EXTENDED RELEASE ORAL at 08:51

## 2021-10-24 ASSESSMENT — PAIN SCALES - GENERAL
PAINLEVEL_OUTOF10: 10
PAINLEVEL_OUTOF10: 10

## 2021-10-24 ASSESSMENT — PAIN DESCRIPTION - LOCATION: LOCATION: OTHER (COMMENT)

## 2021-10-24 ASSESSMENT — PAIN DESCRIPTION - PAIN TYPE: TYPE: CHRONIC PAIN

## 2021-10-24 NOTE — PROGRESS NOTES
Hospitalist Progress Note      PCP: Dayna Joe MD    Date of Admission: 10/21/2021    Chief Complaint:  lethargy, increased confusion    Hospital Course:     80 y.o. female with pmh of  hypothyroidism, hypertension, GERD, depression, ovarian cancer, Alzheimer's dementia presented to the ED for acute encephalopathy.     At the time of admit were unable to obtain history as patient was encephalopathic.  Per report patient had been having worsening confusion poor oral intake and the family was concerned about UTI as patient does have history of this before.  No reported fever chills nausea vomiting chest pain or shortness of or abdominal pain.     On my exam of 10/24/2021 she was more interactive and much more alert. Subjective: She is lying in bed, looks weak and tired. She is much more alert and interactive today. She tells me she feels weak and tired, and that she ate breakfast well. She is able to tell me the name of the hospital, the month and the year.   She denies chest pain or shortness of breath      Medications:  Reviewed    Infusion Medications    sodium chloride       Scheduled Medications    aspirin  81 mg Oral Daily    levothyroxine  50 mcg Oral Daily    losartan  100 mg Oral Daily    NIFEdipine  30 mg Oral Daily    pantoprazole  40 mg Oral QAM AC    enoxaparin  30 mg SubCUTAneous Daily    sodium chloride flush  10 mL IntraVENous 2 times per day    cefTRIAXone (ROCEPHIN) IV  1,000 mg IntraVENous Q24H     PRN Meds: labetalol, hydrALAZINE, traMADol, sodium chloride flush, sodium chloride, potassium chloride, magnesium sulfate, promethazine **OR** ondansetron, acetaminophen **OR** acetaminophen      Intake/Output Summary (Last 24 hours) at 10/24/2021 1156  Last data filed at 10/24/2021 1041  Gross per 24 hour   Intake 660 ml   Output 300 ml   Net 360 ml       Physical Exam Performed:    BP (!) 181/74   Pulse 62   Temp 98.3 °F (36.8 °C) (Oral)   Resp 18   Ht 5' 5.5\" (1.664 m) Wt 106 lb 7.7 oz (48.3 kg)   SpO2 96%   BMI 17.45 kg/m²     General appearance: No apparent distress,pleasant elderly female,  pleasant and cooperative. HEENT: Pupils equal, round, and reactive to light. Conjunctivae/corneas clear. Neck: Supple, with full range of motion. No jugular venous distention. Trachea midline. Respiratory:  Normal respiratory effort. Clear to auscultation, bilaterally without Rales/Wheezes/Rhonchi. Cardiovascular: Regular rate and rhythm with normal S1/S2 . Abdomen: Soft, non-tender, non-distended with normal bowel sounds. Musculoskeletal: No clubbing, cyanosis or edema bilaterally. .  Neurologic: .  Grossly non focal.  Moving PATEL equal bilat. Psychiatric: Awake, more alert today, able to follow simple commands. , able to tell me the name of hospital, the month and the year short term recall, recall 3/3 objects after 1 minute. Peripheral Pulses: +2 palpable, equal bilaterally     Labs:   Recent Labs     10/21/21  2104 10/22/21  0708 10/23/21  0821   WBC 8.7 7.1 6.9   HGB 13.4 12.2 11.4*   HCT 39.9 37.0 34.2*    274 249     Recent Labs     10/21/21  2104 10/22/21  0708 10/23/21  0820    140 137   K 3.8 3.9 3.8    102 103   CO2 21 22 26   BUN 18 21* 21*   CREATININE 0.8 0.7 0.7   CALCIUM 10.0 9.6 9.3     Recent Labs     10/21/21  2104 10/22/21  0708 10/23/21  0820   AST 13* 11* 12*   ALT 7* 6* <5*   BILITOT 0.8 0.5 0.3   ALKPHOS 83 76 68     No results for input(s): INR in the last 72 hours. Recent Labs     10/22/21  0746 10/22/21  1253 10/22/21  1904   TROPONINI 0.05* 0.04* 0.05*       Urinalysis:      Lab Results   Component Value Date    NITRU Negative 10/21/2021    WBCUA  10/21/2021    BACTERIA 4+ 10/21/2021    RBCUA 3-4 10/21/2021    BLOODU TRACE-INTACT 10/21/2021    SPECGRAV 1.025 10/21/2021    GLUCOSEU Negative 10/21/2021       Radiology:  XR CHEST PORTABLE   Final Result   Hyperinflated lungs with coarse interstitial markings and old granuloma.

## 2021-10-25 LAB
ANION GAP SERPL CALCULATED.3IONS-SCNC: 10 MMOL/L (ref 3–16)
BASOPHILS ABSOLUTE: 0.1 K/UL (ref 0–0.2)
BASOPHILS RELATIVE PERCENT: 0.8 %
BUN BLDV-MCNC: 22 MG/DL (ref 7–20)
CALCIUM SERPL-MCNC: 9.4 MG/DL (ref 8.3–10.6)
CHLORIDE BLD-SCNC: 105 MMOL/L (ref 99–110)
CO2: 26 MMOL/L (ref 21–32)
CREAT SERPL-MCNC: 0.7 MG/DL (ref 0.6–1.2)
EOSINOPHILS ABSOLUTE: 0.9 K/UL (ref 0–0.6)
EOSINOPHILS RELATIVE PERCENT: 12.7 %
GFR AFRICAN AMERICAN: >60
GFR NON-AFRICAN AMERICAN: >60
GLUCOSE BLD-MCNC: 111 MG/DL (ref 70–99)
HCT VFR BLD CALC: 36.1 % (ref 36–48)
HEMOGLOBIN: 12.1 G/DL (ref 12–16)
LYMPHOCYTES ABSOLUTE: 2 K/UL (ref 1–5.1)
LYMPHOCYTES RELATIVE PERCENT: 29.2 %
MCH RBC QN AUTO: 30.9 PG (ref 26–34)
MCHC RBC AUTO-ENTMCNC: 33.7 G/DL (ref 31–36)
MCV RBC AUTO: 91.6 FL (ref 80–100)
MONOCYTES ABSOLUTE: 0.4 K/UL (ref 0–1.3)
MONOCYTES RELATIVE PERCENT: 6.4 %
NEUTROPHILS ABSOLUTE: 3.5 K/UL (ref 1.7–7.7)
NEUTROPHILS RELATIVE PERCENT: 50.9 %
PDW BLD-RTO: 14.9 % (ref 12.4–15.4)
PLATELET # BLD: 252 K/UL (ref 135–450)
PMV BLD AUTO: 8.5 FL (ref 5–10.5)
POTASSIUM SERPL-SCNC: 4.2 MMOL/L (ref 3.5–5.1)
RBC # BLD: 3.94 M/UL (ref 4–5.2)
SODIUM BLD-SCNC: 141 MMOL/L (ref 136–145)
WBC # BLD: 6.9 K/UL (ref 4–11)

## 2021-10-25 PROCEDURE — 6360000002 HC RX W HCPCS: Performed by: INTERNAL MEDICINE

## 2021-10-25 PROCEDURE — 1200000000 HC SEMI PRIVATE

## 2021-10-25 PROCEDURE — 6370000000 HC RX 637 (ALT 250 FOR IP): Performed by: INTERNAL MEDICINE

## 2021-10-25 PROCEDURE — 36415 COLL VENOUS BLD VENIPUNCTURE: CPT

## 2021-10-25 PROCEDURE — 85025 COMPLETE CBC W/AUTO DIFF WBC: CPT

## 2021-10-25 PROCEDURE — 96366 THER/PROPH/DIAG IV INF ADDON: CPT

## 2021-10-25 PROCEDURE — 2580000003 HC RX 258: Performed by: INTERNAL MEDICINE

## 2021-10-25 PROCEDURE — G0378 HOSPITAL OBSERVATION PER HR: HCPCS

## 2021-10-25 PROCEDURE — 80048 BASIC METABOLIC PNL TOTAL CA: CPT

## 2021-10-25 RX ADMIN — NIFEDIPINE 30 MG: 30 TABLET, FILM COATED, EXTENDED RELEASE ORAL at 12:26

## 2021-10-25 RX ADMIN — CEFTRIAXONE SODIUM 1000 MG: 1 INJECTION, POWDER, FOR SOLUTION INTRAMUSCULAR; INTRAVENOUS at 14:53

## 2021-10-25 RX ADMIN — LOSARTAN POTASSIUM 100 MG: 100 TABLET, FILM COATED ORAL at 12:26

## 2021-10-25 RX ADMIN — SODIUM CHLORIDE, PRESERVATIVE FREE 10 ML: 5 INJECTION INTRAVENOUS at 19:50

## 2021-10-25 RX ADMIN — SODIUM CHLORIDE, PRESERVATIVE FREE 10 ML: 5 INJECTION INTRAVENOUS at 14:55

## 2021-10-25 RX ADMIN — ASPIRIN 81 MG: 81 TABLET, COATED ORAL at 12:26

## 2021-10-25 ASSESSMENT — PAIN SCALES - GENERAL: PAINLEVEL_OUTOF10: 0

## 2021-10-25 NOTE — CARE COORDINATION
HOC met with son to discuss and explain home hospice options; son declines HOC option and states home at dc with family to provide supportive care.       LG

## 2021-10-25 NOTE — CARE COORDINATION
CM spoke to son this a.m. who is aware that pt does not have SNF recs. He will take pt home and continue to care for her. CM discussed LTC options with son and at this time, he is declining. [facility] will follow for needs.  Yodit Haro RN

## 2021-10-25 NOTE — CONSULTS
Palliative Care Initial Note  Palliative Care Admit date:  10/25/21  Reason for c/s:  GOC, Code status    Advance Directives:  Reviewed the AD's in this EMR and pt designated her son, David Wong, as her healthcare proxy. Pt currently has a full code status. After explaining the components of resuscitation to David Wong, he stated \"we don't want any of that, don't do that to her. \"   David Wong is ready to amend to DNR/DNI. Plan of care/goals:  David Wong states pt does not ambulate and has shown a pattern of having a very poor appetite. He had hospice eval pt \"about a year ago but they said it was too early\" to admit to hospice. Writer reviewed the likelihood for recurrent hospitalizations and the continued worsening of pts nutritional status (currently, BMI 16.7). He shared that pt \"fought hard\" not to be brought into the hospital this time and he would rather not see her endure subsequent admissions. Writer broached the option to consider hospice now and David Wong was very agreeable. He would like to have HOC eval; ref called     Social/Spiritual:  David Wong has been pts primary caregiver for a little over 2 years. He has \"a bath aide come 2X/wk & a nurse who comes infrequently from [address] \"    Sergio Furrow son for the good care he has been providing, he acknowledged it being mentally and physically taxing     Plan:  HOC to eval for home hospice. They will contact David Wong to arrange a mtg.         Reason for consult:    _X_ Advance Care Planning  _X_ Transition of Care Planning  _X_ Psychosocial/Spiritual Support  ___ Symptom Management                                                                                                                                                            Lari Nyhan, RN

## 2021-10-25 NOTE — PROGRESS NOTES
Patient refusing vital signs, or any other care at this time. RN and Physician aware. Patient sleeping in bed with call light in reach.

## 2021-10-25 NOTE — PROGRESS NOTES
Pt is refusing vital signs again and all of her medication, she is very upset when asked questions and does not want to be bothered.

## 2021-10-25 NOTE — PROGRESS NOTES
Hospitalist Progress Note      PCP: Ramón Downing MD    Date of Admission: 10/21/2021    Chief Complaint:  lethargy, increased confusion    Hospital Course:     80 y.o. female with pmh of  hypothyroidism, hypertension, GERD, depression, ovarian cancer, Alzheimer's dementia presented to the ED for acute encephalopathy.   At the time of admit were unable to obtain history as patient was encephalopathic.  Per report patient had been having worsening confusion poor oral intake and the family was concerned about UTI as patient does have history of this before.  No reported fever chills nausea vomiting chest pain or shortness of or abdominal pain.         Subjective: Patient reportedly refusing treatment, does not want to eat or drink, opens eyes to verb stim, followed simple commands. Palliative consult placed, Hospice consulted. Patient's son spoke with Hospice and has declined services. Considering SNF or home.        Medications:  Reviewed    Infusion Medications    sodium chloride       Scheduled Medications    aspirin  81 mg Oral Daily    levothyroxine  50 mcg Oral Daily    losartan  100 mg Oral Daily    NIFEdipine  30 mg Oral Daily    pantoprazole  40 mg Oral QAM AC    enoxaparin  30 mg SubCUTAneous Daily    sodium chloride flush  10 mL IntraVENous 2 times per day    cefTRIAXone (ROCEPHIN) IV  1,000 mg IntraVENous Q24H     PRN Meds: labetalol, hydrALAZINE, traMADol, sodium chloride flush, sodium chloride, potassium chloride, magnesium sulfate, promethazine **OR** ondansetron, acetaminophen **OR** acetaminophen      Intake/Output Summary (Last 24 hours) at 10/25/2021 1448  Last data filed at 10/25/2021 0649  Gross per 24 hour   Intake 600 ml   Output 225 ml   Net 375 ml       Physical Exam Performed:    BP (!) 132/58   Pulse 74   Temp 98.3 °F (36.8 °C) (Oral)   Resp 18   Ht 5' 5.5\" (1.664 m)   Wt 102 lb 1.2 oz (46.3 kg)   SpO2 96%   BMI 16.73 kg/m²     General appearance: Gen will follow     Acute cystitis:  -Urine culture reviewed: e. coli  -Abx:  ceftriaxone will continue and DC within the next 1 to 2 days      Elevated troponin  - PersistentTroponin elevated in the past.  Baseline 0.03-0.05. Will continue to trend  -Likely exacerbated by poorly controlled hypertensive heart disease.   -Patient is asymptomatic. No anginal equivalents  -EKG/Tele monitoring     Malnutrition: BMI 16.39  - Dietitian consulted, recommendations much appreciated  -Supplements to tray as recommended     Hypothyroidism:   -Stable without secondary symtpoms  -Meds: Levothyroxine  -Optimize Thyroid function as outpt with PCP.     Essential Hypertension in the setting of known CAD  -Stable and well controlled  -Meds: ASA, Cozaar, Procardia  -Hydralazine 100 mg po TID prn SBP > 160 mmHg as per home dosing  -Cont to optimize BP as outpt with PCP     GERD:   -Stable continue on Protonix    Disposition : Case management did speak to patient's son.  Considering SNF    DVT Prophylaxis: Lovenox  Diet: ADULT DIET; Easy to Chew  ADULT ORAL NUTRITION SUPPLEMENT; Breakfast, Lunch, Dinner; Standard High Calorie/High Protein Oral Supplement  Code Status: DNR-CC    PT/OT Eval Status: Consulted    Dispo -1 to 2 days when suitable disposition arranged    SANYA Rosas - CNP

## 2021-10-25 NOTE — PROGRESS NOTES
Met with patient's son who did not see any benefit to having hospice services for patient at this time. Updated CNP Nida Bear, [facility] Cavazos, and staff nurse. Message left with [facility] nurse [address]. Tamica Del Castillo RN, [address]   O: [phone]  C: [phone]  F: [phone]   Main & Referrals: [phone]   [email or web address]

## 2021-10-26 VITALS
OXYGEN SATURATION: 94 % | HEART RATE: 97 BPM | TEMPERATURE: 98.5 F | DIASTOLIC BLOOD PRESSURE: 64 MMHG | SYSTOLIC BLOOD PRESSURE: 109 MMHG | HEIGHT: 66 IN | BODY MASS INDEX: 17.72 KG/M2 | WEIGHT: 110.23 LBS | RESPIRATION RATE: 16 BRPM

## 2021-10-26 PROCEDURE — G0378 HOSPITAL OBSERVATION PER HR: HCPCS

## 2021-10-26 PROCEDURE — 6370000000 HC RX 637 (ALT 250 FOR IP): Performed by: INTERNAL MEDICINE

## 2021-10-26 PROCEDURE — 2580000003 HC RX 258: Performed by: INTERNAL MEDICINE

## 2021-10-26 PROCEDURE — 6360000002 HC RX W HCPCS: Performed by: INTERNAL MEDICINE

## 2021-10-26 RX ORDER — CEFDINIR 300 MG/1
300 CAPSULE ORAL 2 TIMES DAILY
Qty: 6 CAPSULE | Refills: 0 | Status: SHIPPED | OUTPATIENT
Start: 2021-10-26 | End: 2021-10-29

## 2021-10-26 RX ADMIN — NIFEDIPINE 30 MG: 30 TABLET, FILM COATED, EXTENDED RELEASE ORAL at 09:12

## 2021-10-26 RX ADMIN — SODIUM CHLORIDE, PRESERVATIVE FREE 10 ML: 5 INJECTION INTRAVENOUS at 09:13

## 2021-10-26 RX ADMIN — ENOXAPARIN SODIUM 30 MG: 30 INJECTION SUBCUTANEOUS at 09:12

## 2021-10-26 RX ADMIN — PANTOPRAZOLE SODIUM 40 MG: 40 TABLET, DELAYED RELEASE ORAL at 06:17

## 2021-10-26 RX ADMIN — LOSARTAN POTASSIUM 100 MG: 100 TABLET, FILM COATED ORAL at 09:12

## 2021-10-26 RX ADMIN — ASPIRIN 81 MG: 81 TABLET, COATED ORAL at 09:12

## 2021-10-26 RX ADMIN — HYDRALAZINE HYDROCHLORIDE 100 MG: 50 TABLET, FILM COATED ORAL at 08:10

## 2021-10-26 RX ADMIN — LEVOTHYROXINE SODIUM 50 MCG: 0.05 TABLET ORAL at 06:17

## 2021-10-26 ASSESSMENT — PAIN DESCRIPTION - PAIN TYPE: TYPE: CHRONIC PAIN

## 2021-10-26 ASSESSMENT — PAIN SCALES - GENERAL
PAINLEVEL_OUTOF10: 0
PAINLEVEL_OUTOF10: 10

## 2021-10-26 ASSESSMENT — PAIN DESCRIPTION - LOCATION: LOCATION: OTHER (COMMENT)

## 2021-10-26 NOTE — PLAN OF CARE
Problem: Falls - Risk of:  Goal: Will remain free from falls  Description: Will remain free from falls  10/26/2021 1109 by Sheela Mays RN  Outcome: Ongoing  Note: Pt will remain free from falls throughout hospital stay. Fall precautions in place, bed alarm on, bed in lowest position with wheels locked and side rails 2/4 up. Room door open and hourly rounding completed. Will continue to monitor throughout shift. Problem: Skin Integrity:  Goal: Absence of new skin breakdown  Description: Absence of new skin breakdown  10/26/2021 1109 by Sheela Mays RN  Outcome: Ongoing  Note: Pt is at risk for skin breakdown. Pt will have skin assessments every shift, Q2 turns, heels elevated off of the bed, and friction and shear prevented when possible. Will continue to monitor for signs of skin breakdown and enforce prevention measures.

## 2021-10-26 NOTE — DISCHARGE SUMMARY
Hospital Medicine Discharge Summary    Patient ID: Nayeli Pool      Patient's PCP: Inga Willard MD    Admit Date: 10/21/2021     Discharge Date: 10/26/2021      Admitting Physician: Kerri Aguilar DO     Discharge Physician: Jacque Bernheim, APRN - CNP     Discharge Diagnoses: Active Hospital Problems    Diagnosis     Acute pyelonephritis [N10]        The patient was seen and examined on day of discharge and this discharge summary is in conjunction with any daily progress note from day of discharge. Hospital Course:   80 y.o. female with pmh of Hypothyroidism, Hypertension, GERD, Depression, Ovarian cancer, Alzheimer's dementia presented to the ED for acute encephalopathy. At the time of admit, unable to obtain history as patient was encephalopathic.  Per report patient had been having worsening confusion poor oral intake and the family was concerned about UTI as patient does have history of this before.  No reported fever chills nausea vomiting chest pain or shortness of or abdominal pain. Acute metabolic encephalopathy:  -Suspected secondary to UTI, with underlying baseline Alzheimer dementia  - CT head negative  -Treat acute cystitis as outlined. - Continue supportive care and will follow     Acute cystitis:  -Urine culture reviewed: e. coli  -Abx:  ceftriaxone will continue and DC within the next 1 to 2 days      Elevated troponin  - Persistent Troponin elevated in the past.  Baseline 0.03-0.05. Will continue to trend  -Likely exacerbated by poorly controlled hypertensive heart disease.   -Patient is asymptomatic.  No anginal equivalents  -EKG/Tele monitoring     Malnutrition: BMI 16.39  - Dietitian consulted, recommendations much appreciated  -Supplements to tray as recommended     Hypothyroidism:   -Stable without secondary symtpoms  -Meds: Levothyroxine  -Optimize Thyroid function as outpt with PCP.     Essential Hypertension in the setting of known CAD  -Stable and well controlled  -Meds: ASA, Cozaar, Procardia  -Hydralazine 100 mg po TID prn SBP > 160 mmHg as per home dosing  -Cont to optimize BP as outpt with PCP     GERD:   -Stable continue on Protonix     Disposition : Home     Patient treated for encephalopathy 2/2 pansensitive ecoli uti, transitioned to oral abx. Patient remains weak/frail, requires full assistance from son/caregiver-which is baseline. Trop elevation chronic. Hospice consulted. Patient's son does not see a benefit from this service. DNR, condition guarded. Physical Exam Performed:     /64   Pulse 97   Temp 98.5 °F (36.9 °C) (Oral)   Resp 16   Ht 5' 5.5\" (1.664 m)   Wt 110 lb 3.7 oz (50 kg)   SpO2 94%   BMI 18.06 kg/m²       General appearance: Gen weak/frail  HEENT: Pupils equal, round, and reactive to light. Conjunctivae/corneas clear. Neck: Supple, with full range of motion. No jugular venous distention. Trachea midline. Respiratory:  Normal respiratory effort. Clear to auscultation, bilaterally without Rales/Wheezes/Rhonchi. Cardiovascular: Regular rate and rhythm with normal S1/S2 . Abdomen: Soft, non-tender, non-distended with normal bowel sounds. Musculoskeletal: No clubbing, cyanosis or edema bilaterally. .  Neurologic: . Darlene Paulson non focal.  Moving PATEL equal bilat. Psychiatric: Awake, able to follow simple commands.     Peripheral Pulses: +2 palpable, equal bilaterally       Labs:  For convenience and continuity at follow-up the following most recent labs are provided:      CBC:    Lab Results   Component Value Date    WBC 6.9 10/25/2021    HGB 12.1 10/25/2021    HCT 36.1 10/25/2021     10/25/2021       Renal:    Lab Results   Component Value Date     10/25/2021    K 4.2 10/25/2021    K 3.8 10/23/2021     10/25/2021    CO2 26 10/25/2021    BUN 22 10/25/2021    CREATININE 0.7 10/25/2021    CALCIUM 9.4 10/25/2021    PHOS 3.2 12/24/2019         Significant Diagnostic Studies    Radiology:   XR CHEST PORTABLE Final Result   Hyperinflated lungs with coarse interstitial markings and old granuloma. Stable appearance from 2020. CT Head WO Contrast   Final Result   No acute hemorrhage or midline shift. Other findings as described. Consults:     IP CONSULT TO HOSPITALIST  IP CONSULT TO PALLIATIVE CARE  IP CONSULT TO DIETITIAN  IP CONSULT TO SOCIAL WORK  IP CONSULT TO HOSPICE    Disposition:  Home    Condition at Discharge: Stable    Discharge Instructions/Follow-up:  See AVS    Code Status:  Prior     Activity: activity as tolerated    Diet: regular diet      Discharge Medications:     Discharge Medication List as of 10/26/2021  1:26 PM           Details   cefdinir (OMNICEF) 300 MG capsule Take 1 capsule by mouth 2 times daily for 3 days, Disp-6 capsule, R-0Normal              Details   vitamin D (CHOLECALCIFEROL) 25 MCG (1000 UT) TABS tablet Take 1,000 Units by mouth dailyHistorical Med      hydrALAZINE (APRESOLINE) 100 MG tablet Take 1 tablet by mouth 3 times daily as needed (SBP > 160 mmHg), Disp-60 tablet, R-3NO PRINT      levothyroxine (SYNTHROID) 25 MCG tablet Take 2 tablets by mouth Daily, Disp-30 tablet, R-0Print      traMADol (ULTRAM) 50 MG tablet Take 50 mg by mouth every 6 hours as needed for Pain. Historical Med      aspirin 81 MG EC tablet Take 81 mg by mouth daily Historical Med      NIFEdipine (ADALAT CC) 30 MG extended release tablet TAKE 1 TABLET BY MOUTH NIGHTLY, R-11Historical Med      losartan (COZAAR) 100 MG tablet TAKE 1 TABLET EVERY DAY, Disp-90 tablet, R-1Normal      metoprolol succinate (TOPROL XL) 100 MG extended release tablet TAKE 1 TABLET EVERY DAY, Disp-90 tablet, R-1Normal      omeprazole (PRILOSEC) 20 MG delayed release capsule TAKE 1 CAPSULE EVERY DAY, Disp-90 capsule, R-1Normal             Time Spent on discharge is more than 30 minutes in the examination, evaluation, counseling and review of medications and discharge plan.       Signed:    SANYA Rosas CNP   11/15/2021      Thank you Angela Tran MD for the opportunity to be involved in this patient's care. If you have any questions or concerns please feel free to contact me at [phone].

## 2021-10-26 NOTE — CARE COORDINATION
CASE MANAGEMENT DISCHARGE SUMMARY      Discharge to: home w/no needs  IMM given: (date) 10/26/2021    New Durable Medical Equipment ordered/agency: none    Transportation: private  Confirmed discharge plan with:     Patient: yes     Family:  yes    Name: Lit Kent number: [phone]       RN, name: Merlin Gerold, RN    Note: Son states he will take pt home. Pt denying needs, refusing transport and hospice. Should needs arise, please contact CM.   Matt Hope RN

## 2021-10-26 NOTE — PROGRESS NOTES
Pt d/c'd home. Removed peripheral IV and stopped bleeding. Catheter intact. Pt tolerated well. No redness noted at site. Reviewed d/c instructions, home meds, and  f/u information utilizing teach-back method with son and pt. Patient verbalized understanding. Patient taken to outpatient pharmacy to  prescriptions and taken to car via wheelchair with all belongings.

## 2022-01-10 ENCOUNTER — TELEPHONE (OUTPATIENT)
Dept: FAMILY MEDICINE CLINIC | Age: 87
End: 2022-01-10

## 2022-01-10 NOTE — TELEPHONE ENCOUNTER
----- Message from Honorio Denton sent at 1/10/2022  8:45 AM EST -----  Subject: Message to Provider    QUESTIONS  Information for Provider? Pt needs a call back to discuss further   information. no details were given.  ---------------------------------------------------------------------------  --------------  CALL BACK INFO  What is the best way for the office to contact you? OK to leave message on   voicemail  Preferred Call Back Phone Number? [phone]  ---------------------------------------------------------------------------  --------------  SCRIPT ANSWERS  Relationship to Patient?  Self

## 2025-02-10 NOTE — CARE COORDINATION
Patient contacted regarding COVID-19 risk and screening. Care Transition Nurse/ Ambulatory Care Manager contacted the family by telephone to perform follow-up assessment. Verified name and  with family as identifiers. Patient has following risk factors of: no known risk factors. Symptoms reviewed with family who verbalized the following symptoms: no new symptoms and no worsening symptoms. Due to no new or worsening symptoms encounter was not routed to provider for escalation. Education provided regarding infection prevention, and signs and symptoms of COVID-19 and when to seek medical attention with family who verbalized understanding. Discussed exposure protocols and quarantine from [address] you at higher risk for severe illness  and given an opportunity for questions and concerns. The family agrees to contact the COVID-19 hotline [phone] or PCP office for questions related to their healthcare. CTN/ACM provided contact information for future reference. From [facility]: Are you at higher risk for severe illness?  Wash your hands often.  Avoid close contact (6 feet, which is about two arm lengths) with people who are sick.  Put distance between yourself and other people if COVID-19 is spreading in your community.  Clean and disinfect frequently touched surfaces.  Avoid all cruise travel and non-essential air travel.  Call your healthcare professional if you have concerns about COVID-19 and your underlying condition or if you are sick. For more information on steps you can take to protect yourself, see CDC's How to [address] with patient's son Stephon Ying who stated patient was about the same. Stephon Ying reported that he had a palliative care nurse come out to the home to discuss services. Stephon Ying stated at this time there isn't much palliative care can offer to patient that they aren't already receiving through [place] [address] services.  Stephon Ying stated he will Spoke w/ pt to schedule consult  Pt declined 2-10-25 & 2-11-25 due to working, offered him 2-17-25 @ 3 PM, pt will call back to schedule, will talk to employer and call back.  Provided call back # [phone]   continue to communicate through [facility] to further assess resources for patient. Akin Sandhu denied any further needs at this time. CTN advised patient of use of urgent care or physicians 24 hr access line if assistance is needed after hours. Also advised that they can always contact their home care provider to request a nurse visit even if it isn't their regularly scheduled day for their nurse to visit.          Gagan HOOKN, RN, [facility]  Care Transition Nurse   [phone]

## 2025-03-28 NOTE — FLOWSHEET NOTE
04/19/19 1039   Encounter Summary   Services provided to: Patient   Referral/Consult From: Nurse   Support System Children   Continue Visiting   (4-19, completed HCPOA, declined living will)   Complexity of Encounter Moderate   Length of Encounter 45 minutes   Advance Directives (For Healthcare)   Healthcare Directive Yes, patient has an advance directive for healthcare treatment   Type of Healthcare Directive Durable power of  for health care   Copy in Chart Yes, copy in chart   Chart Copy Status : Current   Date Reviewed and Current: 04/19/19   Advance Directives Healthcare power of attornery completed   Healthcare Agent Appointed Adult [address] Agent's Name   Bhumi Herndon, son)   Healthcare Agent's Phone Number   ([phone] (c))    helped patient complete health care power of . She does not want to complete a living will at this time stating, \"They all know what I want. \"  PRN follow-up. RN received call from patient's emergency contact Cory ARTEAGA to follow up on patient's lab results. RN relayed MD Pino' note to Cory \" Please increase daily intake of water as we discussed yesterday - that would help to improve the kidney numbers\". Cory verbalized understanding and stated that he will try to encourage June to drink more water.